# Patient Record
Sex: FEMALE | Race: WHITE | NOT HISPANIC OR LATINO | Employment: OTHER | URBAN - METROPOLITAN AREA
[De-identification: names, ages, dates, MRNs, and addresses within clinical notes are randomized per-mention and may not be internally consistent; named-entity substitution may affect disease eponyms.]

---

## 2017-01-24 ENCOUNTER — RADIANT APPOINTMENT (OUTPATIENT)
Dept: GENERAL RADIOLOGY | Facility: CLINIC | Age: 62
End: 2017-01-24
Attending: FAMILY MEDICINE
Payer: COMMERCIAL

## 2017-01-24 ENCOUNTER — OFFICE VISIT (OUTPATIENT)
Dept: URGENT CARE | Facility: URGENT CARE | Age: 62
End: 2017-01-24
Payer: COMMERCIAL

## 2017-01-24 VITALS
HEART RATE: 61 BPM | HEIGHT: 69 IN | BODY MASS INDEX: 22.96 KG/M2 | WEIGHT: 155 LBS | DIASTOLIC BLOOD PRESSURE: 59 MMHG | OXYGEN SATURATION: 98 % | SYSTOLIC BLOOD PRESSURE: 112 MMHG | TEMPERATURE: 97.9 F

## 2017-01-24 DIAGNOSIS — S69.92XA LEFT WRIST INJURY, INITIAL ENCOUNTER: ICD-10-CM

## 2017-01-24 DIAGNOSIS — S63.502A LEFT WRIST SPRAIN, INITIAL ENCOUNTER: Primary | ICD-10-CM

## 2017-01-24 PROCEDURE — 73110 X-RAY EXAM OF WRIST: CPT | Mod: LT

## 2017-01-24 PROCEDURE — 99213 OFFICE O/P EST LOW 20 MIN: CPT | Performed by: FAMILY MEDICINE

## 2017-01-24 NOTE — PATIENT INSTRUCTIONS
Wrist Sprain  A sprain is an injury to the ligaments or capsule that holds a joint together. There are no broken bones. Most sprains take about 3 to 6 weeks to heal. If it a severe sprain where the ligament is completely torn, it can take months to recover.    Most wrist sprains are treated with a splint, wrist brace, or elastic wrap for support. Severe sprains may require surgery.  Home care    Keep your arm elevated to reduce pain and swelling. This is very important during the first 48 hours.    Apply an ice pack over the injured area for 15 to 20 minutes every 3 to 6 hours. You should do this for the first 24 to 48 hours. You can make an ice pack by filling a plastic bag that seals at the top with ice cubes and then wrapping it with a thin towel. Continue to use ice packs for relief of pain and swelling as needed. As the ice melts, be careful to avoid getting your wrap, splint, or cast wet. After 48 hours, apply heat (warm shower or warm bath) for 15 to 20 minutes several times a day, or alternate ice and heat.     You may use over-the-counter pain medicine to control pain, unless another pain medicine was prescribed. If you have chronic liver or kidney disease or ever had a stomach ulcer or GI bleeding, talk with your doctor before using these medicines.    If you were given a splint or brace, wear it for the time advised by your doctor.  Follow-up care  Follow up with your healthcare provider as advised. Any X-rays you had today don t show any broken bones, breaks, or fractures. Sometimes fractures don t show up on the first X-ray. Bruises and sprains can sometimes hurt as much as a fracture. These injuries can take time to heal completely. If your symptoms don t improve or they get worse, talk with your doctor. You may need a repeat X-ray. If X-rays were taken, you will be told of any new findings that may affect your care.  When to seek medical advice  Call your healthcare provider right away if any of  these occur:    Pain or swelling increases    Fingers or hand becomes cold, blue, numb, or tingly    1818-7145 [employer]. [address] [place] [address], [place] [address]. All rights reserved. This information is not intended as a substitute for professional medical care. Always follow your healthcare professional's instructions.

## 2017-01-24 NOTE — MR AVS SNAPSHOT
After Visit Summary   1/24/2017    Debbie Richardson    MRN: [id number]           Patient Information     Date Of Birth          1955        Visit Information        Provider Department      1/24/2017 5:00 PM Kaela Gillespie MD [facility]        Today's Diagnoses     Left wrist sprain, initial encounter    -  1     Left wrist injury, initial encounter           Care Instructions      Wrist Sprain  A sprain is an injury to the ligaments or capsule that holds a joint together. There are no broken bones. Most sprains take about 3 to 6 weeks to heal. If it a severe sprain where the ligament is completely torn, it can take months to recover.    Most wrist sprains are treated with a splint, wrist brace, or elastic wrap for support. Severe sprains may require surgery.  Home care    Keep your arm elevated to reduce pain and swelling. This is very important during the first 48 hours.    Apply an ice pack over the injured area for 15 to 20 minutes every 3 to 6 hours. You should do this for the first 24 to 48 hours. You can make an ice pack by filling a plastic bag that seals at the top with ice cubes and then wrapping it with a thin towel. Continue to use ice packs for relief of pain and swelling as needed. As the ice melts, be careful to avoid getting your wrap, splint, or cast wet. After 48 hours, apply heat (warm shower or warm bath) for 15 to 20 minutes several times a day, or alternate ice and heat.     You may use over-the-counter pain medicine to control pain, unless another pain medicine was prescribed. If you have chronic liver or kidney disease or ever had a stomach ulcer or GI bleeding, talk with your doctor before using these medicines.    If you were given a splint or brace, wear it for the time advised by your doctor.  Follow-up care  Follow up with your healthcare provider as advised. Any X-rays you had today don t show any broken bones, breaks,  or fractures. Sometimes fractures don t show up on the first X-ray. Bruises and sprains can sometimes hurt as much as a fracture. These injuries can take time to heal completely. If your symptoms don t improve or they get worse, talk with your doctor. You may need a repeat X-ray. If X-rays were taken, you will be told of any new findings that may affect your care.  When to seek medical advice  Call your healthcare provider right away if any of these occur:    Pain or swelling increases    Fingers or hand becomes cold, blue, numb, or tingly    7243-8808 [employer]. [address] [place] [address]. All rights reserved. This information is not intended as a substitute for professional medical care. Always follow your healthcare professional's instructions.              Follow-ups after your visit        Who to contact     If you have questions or need follow up information about today's clinic visit or your schedule please contact [facility] directly at [phone].  Normal or non-critical lab and imaging results will be communicated to you by [employer], letter or phone within 4 business days after the clinic has received the results. If you do not hear from us within 7 days, please contact the clinic through [employer] or phone. If you have a critical or abnormal lab result, we will notify you by phone as soon as possible.  Submit refill requests through [employer] or call your pharmacy and they will forward the refill request to us. Please allow 3 business days for your refill to be completed.          Additional Information About Your Visit        [employer] Information     [employer] gives you secure access to your electronic health record. If you see a primary care provider, you can also send messages to your care team and make appointments. If you have questions, please call your primary care clinic.  If you do not have a primary care provider, please call [phone] and they  "will assist you.        Care EveryWhere ID     This is your Care EveryWhere ID. This could be used by other organizations to access your [place] medical records  [id number]        Your Vitals Were     Pulse Temperature Height BMI (Body Mass Index) Pulse Oximetry Last Period    61 97.9  F (36.6  C) (Tympanic) 5' 8.5\" (1.74 m) 23.22 kg/m2 98% 07/13/2008       Blood Pressure from Last 3 Encounters:   01/24/17 112/59   11/30/16 101/65   08/16/16 106/64    Weight from Last 3 Encounters:   01/24/17 155 lb (70.308 kg)   11/30/16 156 lb (70.761 kg)   07/12/16 150 lb (68.04 kg)                 Today's Medication Changes          These changes are accurate as of: 1/24/17  5:35 PM.  If you have any questions, ask your nurse or doctor.               Start taking these medicines.        Dose/Directions    order for DME   Used for:  Left wrist sprain, initial encounter   Started by:  Kaela Gillespie MD        Equipment being ordered: left wrist splint   Quantity:  1 Device   Refills:  0         These medicines have changed or have updated prescriptions.        Dose/Directions    sertraline 50 MG tablet   Commonly known as:  ZOLOFT   This may have changed:    - how much to take  - when to take this  - additional instructions   Used for:  Major depressive disorder, recurrent episode, mild (H)        Take one and 1/2 tablets daily   Quantity:  135 tablet   Refills:  PRN            Where to get your medicines      Some of these will need a paper prescription and others can be bought over the counter.  Ask your nurse if you have questions.     Bring a paper prescription for each of these medications    - order for DME             Primary Care Provider Office Phone # Fax #    Ai De La Paz [phone]       [facility] [address] [place]  [facility] [address]        Thank you!     Thank you for choosing [facility]  for your care. Our goal is always to provide " you with excellent care. Hearing back from our patients is one way we can continue to improve our services. Please take a few minutes to complete the written survey that you may receive in the mail after your visit with us. Thank you!             Your Updated Medication List - Protect others around you: Learn how to safely use, store and throw away your medicines at www.disposemymeds.org.          This list is accurate as of: 1/24/17  5:35 PM.  Always use your most recent med list.                   Brand Name Dispense Instructions for use    conjugated estrogens cream    PREMARIN    30 g    Use 1.5 gm vaginally 2-3 times a week       order for DME     1 Device    Equipment being ordered: left wrist splint       sertraline 50 MG tablet    ZOLOFT    135 tablet    Take one and 1/2 tablets daily       traZODone 100 MG tablet    DESYREL    180 tablet    Take  by mouth At Bedtime. Take 1 1/2 to 2 tablets by mouth at bedtime for sleep       VITAMIN B COMPLEX PO          VITAMIN C PO          VITAMIN D (CHOLECALCIFEROL) PO      Take by mouth as needed

## 2017-01-24 NOTE — NURSING NOTE
"Chief Complaint   Patient presents with     Urgent Care     Wrist Pain     c/o left wrist painful for 1 day       Initial /59 mmHg  Pulse 61  Temp(Src) 97.9  F (36.6  C) (Tympanic)  Ht 5' 8.5\" (1.74 m)  Wt 155 lb (70.308 kg)  BMI 23.22 kg/m2  SpO2 98%  LMP 07/13/2008 Estimated body mass index is 23.22 kg/(m^2) as calculated from the following:    Height as of this encounter: 5' 8.5\" (1.74 m).    Weight as of this encounter: 155 lb (70.308 kg).  BP completed using cuff size: regular  Taisha Ashton MA    "

## 2017-01-24 NOTE — PROGRESS NOTES
SUBJECTIVE:  Debbie Richardson is a 61 year old female who sustained a right wrist injury 2  hours ago. Mechanism of injury: fell on outstretched hand when slipped [place] icy surface at dog park. Immediate symptoms: immediate pain, delayed swelling, inability to use wrist directly after injury. Symptoms have been worsening since that time. Prior history of related problems: no prior problems with this area in the past.    OBJECTIVE:  Vital signs as noted above.  Appearance: in no apparent distress.  Left wrist exam: soft tissue tenderness and swelling at the distal radius and joint space radially, scaphoid (snuffbox) tenderness absent, radial pulse normal, remainder of ipsilateral wrist, hand and finger exam is normal.    Left wrist X-ray: no fracture or dislocation noted.    ASSESSMENT:  Left wrist sprain    PLAN:  Tylenol or Ibuprofen OTC, ice suggested.  Wrist splint use.  Activity modification and elevation prn.   See PCP in follow up in 2 weeks.  See orders in EpicCare.  Kaela Walker MD

## 2017-02-10 ENCOUNTER — MYC MEDICAL ADVICE (OUTPATIENT)
Dept: FAMILY MEDICINE | Facility: CLINIC | Age: 62
End: 2017-02-10

## 2017-02-13 NOTE — TELEPHONE ENCOUNTER
JYOTHI FYI:   Left wrist injury improved with home care. A no show appt was on 2/9.  Yuliana Xie RN

## 2017-03-27 ENCOUNTER — RADIANT APPOINTMENT (OUTPATIENT)
Dept: MAMMOGRAPHY | Facility: CLINIC | Age: 62
End: 2017-03-27
Attending: NURSE PRACTITIONER
Payer: COMMERCIAL

## 2017-03-27 DIAGNOSIS — Z12.31 VISIT FOR SCREENING MAMMOGRAM: ICD-10-CM

## 2017-03-27 PROCEDURE — G0202 SCR MAMMO BI INCL CAD: HCPCS

## 2017-04-05 ENCOUNTER — OFFICE VISIT (OUTPATIENT)
Dept: FAMILY MEDICINE | Facility: CLINIC | Age: 62
End: 2017-04-05
Payer: COMMERCIAL

## 2017-04-05 VITALS
OXYGEN SATURATION: 96 % | HEIGHT: 69 IN | HEART RATE: 72 BPM | WEIGHT: 159 LBS | SYSTOLIC BLOOD PRESSURE: 97 MMHG | RESPIRATION RATE: 18 BRPM | BODY MASS INDEX: 23.55 KG/M2 | DIASTOLIC BLOOD PRESSURE: 67 MMHG

## 2017-04-05 DIAGNOSIS — Z23 NEED FOR HEPATITIS A VACCINATION: ICD-10-CM

## 2017-04-05 DIAGNOSIS — B37.2 CUTANEOUS CANDIDIASIS: ICD-10-CM

## 2017-04-05 DIAGNOSIS — G47.00 INSOMNIA, UNSPECIFIED: ICD-10-CM

## 2017-04-05 DIAGNOSIS — N95.2 VAGINAL ATROPHY: ICD-10-CM

## 2017-04-05 DIAGNOSIS — Z11.59 NEED FOR HEPATITIS C SCREENING TEST: ICD-10-CM

## 2017-04-05 DIAGNOSIS — Z00.00 ENCOUNTER FOR ROUTINE ADULT HEALTH EXAMINATION WITHOUT ABNORMAL FINDINGS: Primary | ICD-10-CM

## 2017-04-05 PROCEDURE — 90471 IMMUNIZATION ADMIN: CPT | Performed by: NURSE PRACTITIONER

## 2017-04-05 PROCEDURE — 80061 LIPID PANEL: CPT | Performed by: NURSE PRACTITIONER

## 2017-04-05 PROCEDURE — 36415 COLL VENOUS BLD VENIPUNCTURE: CPT | Performed by: NURSE PRACTITIONER

## 2017-04-05 PROCEDURE — 86803 HEPATITIS C AB TEST: CPT | Performed by: NURSE PRACTITIONER

## 2017-04-05 PROCEDURE — 90632 HEPA VACCINE ADULT IM: CPT | Performed by: NURSE PRACTITIONER

## 2017-04-05 PROCEDURE — 82947 ASSAY GLUCOSE BLOOD QUANT: CPT | Performed by: NURSE PRACTITIONER

## 2017-04-05 PROCEDURE — 99396 PREV VISIT EST AGE 40-64: CPT | Mod: 25 | Performed by: NURSE PRACTITIONER

## 2017-04-05 RX ORDER — TRAZODONE HYDROCHLORIDE 100 MG/1
100 TABLET ORAL AT BEDTIME
Qty: 90 TABLET | Refills: 0 | Status: SHIPPED | OUTPATIENT
Start: 2017-04-05 | End: 2017-06-20

## 2017-04-05 RX ORDER — NYSTATIN 100000 U/G
CREAM TOPICAL 2 TIMES DAILY
Qty: 30 G | Refills: 0 | Status: SHIPPED | OUTPATIENT
Start: 2017-04-05 | End: 2017-06-20

## 2017-04-05 NOTE — LETTER
My Depression Action Plan  Name: Debbie Richardson   Date of Birth 1955  Date: 4/5/2017    My doctor: Ai De La Paz   My clinic: [address] [address]  [phone]          GREEN    ZONE   Good Control    What it looks like:     Things are going generally well. You have normal up s and down s. You may even feel depressed from time to time, but bad moods usually last less than a day.   What you need to do:  1. Continue to care for yourself (see self care plan)  2. Check your depression survival kit and update it as needed  3. Follow your physician s recommendations including any medication.  4. Do not stop taking medication unless you consult with your physician first.           YELLOW         ZONE Getting Worse    What it looks like:     Depression is starting to interfere with your life.     It may be hard to get out of bed; you may be starting to isolate yourself from others.    Symptoms of depression are starting to last most all day and this has happened for several days.     You may have suicidal thoughts but they are not constant.   What you need to do:     1. Call your care team, your response to treatment will improve if you keep your care team informed of your progress. Yellow periods are signs an adjustment may need to be made.     2. Continue your self-care, even if you have to fake it!    3. Talk to someone in your support network    4. Open up your depression survival kit           RED    ZONE Medical Alert - Get Help    What it looks like:     Depression is seriously interfering with your life.     You may experience these or other symptoms: You can t get out of bed most days, can t work or engage in other necessary activities, you have trouble taking care of basic hygiene, or basic responsibilities, thoughts of suicide or death that will not go away, self-injurious behavior.     What you need to do:  1. Call your  care team and request a same-day appointment. If they are not available (weekends or after hours) call your local crisis line, emergency room or 911.      Electronically signed by: Mara Santos, April 5, 2017    Depression Self Care Plan / Survival Kit    Self-Care for Depression  Here s the deal. Your body and mind are really not as separate as most people think.  What you do and think affects how you feel and how you feel influences what you do and think. This means if you do things that people who feel good do, it will help you feel better.  Sometimes this is all it takes.  There is also a place for medication and therapy depending on how severe your depression is, so be sure to consult with your medical provider and/ or Behavioral Health Consultant if your symptoms are worsening or not improving.     In order to better manage my stress, I will:    Exercise  Get some form of exercise, every day. This will help reduce pain and release endorphins, the  feel good  chemicals in your brain. This is almost as good as taking antidepressants!  This is not the same as joining a gym and then never going! (they count on that by the way ) It can be as simple as just going for a walk or doing some gardening, anything that will get you moving.      Hygiene   Maintain good hygiene (Get out of bed in the morning, Make your bed, Brush your teeth, Take a shower, and Get dressed like you were going to work, even if you are unemployed).  If your clothes don't fit try to get ones that do.    Diet  I will strive to eat foods that are good for me, drink plenty of water, and avoid excessive sugar, caffeine, alcohol, and other mood-altering substances.  Some foods that are helpful in depression are: complex carbohydrates, B vitamins, flaxseed, fish or fish oil, fresh fruits and vegetables.    Psychotherapy  I agree to participate in Individual Therapy (if recommended).    Medication  If prescribed medications, I agree to take them.   Missing doses can result in serious side effects.  I understand that drinking alcohol, or other illicit drug use, may cause potential side effects.  I will not stop my medication abruptly without first discussing it with my provider.    Staying Connected With Others  I will stay in touch with my friends, family members, and my primary care provider/team.    Use your imagination  Be creative.  We all have a creative side; it doesn t matter if it s oil painting, sand castles, or mud pies! This will also kick up the endorphins.    Witness Beauty  (AKA stop and smell the roses) Take a look outside, even in mid-winter. Notice colors, textures. Watch the squirrels and birds.     Service to others  Be of service to others.  There is always someone else in need.  By helping others we can  get out of ourselves  and remember the really important things.  This also provides opportunities for practicing all the other parts of the program.    Humor  Laugh and be silly!  Adjust your TV habits for less news and crime-drama and more comedy.    Control your stress  Try breathing deep, massage therapy, biofeedback, and meditation. Find time to relax each day.     My support system    Clinic Contact:  Phone number:    Contact 1:  Phone number:    Contact 2:  Phone number:    [employer]/:  Phone number:    Therapist:  Phone number:    Local crisis center:    Phone number:    Other community support:  Phone number:

## 2017-04-05 NOTE — NURSING NOTE
Screening Questionnaire for Adult Immunization    Are you sick today?   No   Do you have allergies to medications, food, a vaccine component or latex?   No   Have you ever had a serious reaction after receiving a vaccination?   No   Do you have a long-term health problem with heart disease, lung disease, asthma, kidney disease, metabolic disease (e.g. diabetes), anemia, or other blood disorder?   No   Do you have cancer, leukemia, HIV/AIDS, or any other immune system problem?   No   In the past 3 months, have you taken medications that affect  your immune system, such as prednisone, other steroids, or anticancer drugs; drugs for the treatment of rheumatoid arthritis, Crohn s disease, or psoriasis; or have you had radiation treatments?   No   Have you had a seizure, or a brain or other nervous system problem?   No   During the past year, have you received a transfusion of blood or blood     products, or been given immune (gamma) globulin or antiviral drug?   No   For women: Are you pregnant or is there a chance you could become        pregnant during the next month?   No   Have you received any vaccinations in the past 4 weeks?   No     Immunization questionnaire answers were all negative.      MNVFC doesn't apply on this patient    Per orders of Dr. De La Paz, injection of Hep A given by Mara Santos. Patient instructed to remain in clinic for 20 minutes afterwards, and to report any adverse reaction to me immediately.       Screening performed by Mara Santos on 4/5/2017 at 1:08 PM.

## 2017-04-05 NOTE — MR AVS SNAPSHOT
After Visit Summary   4/5/2017    Debbie Richardson    MRN: [id number]           Patient Information     Date Of Birth          1955        Visit Information        Provider Department      4/5/2017 10:30 AM Ai De La Paz NP [facility]        Today's Diagnoses     Encounter for routine adult health examination without abnormal findings    -  1    Need for hepatitis C screening test        Vaginal atrophy        Insomnia, unspecified        Need for hepatitis A vaccination        Cutaneous candidiasis          Care Instructions    Decrease Zoloft to 1/2 tablet daily for 2 weeks then stop.    After a few weeks, you could try decreasing Trazodone to 1/2 tablet nightly for 2 weeks and if sleeping well, try stopping it.       Preventive Health Recommendations  Female Ages 50 - 64    Yearly exam: See your health care provider every year in order to  o Review health changes.   o Discuss preventive care.    o Review your medicines if your doctor has prescribed any.      Get a Pap test every three years (unless you have an abnormal result and your provider advises testing more often).    If you get Pap tests with HPV test, you only need to test every 5 years, unless you have an abnormal result.     You do not need a Pap test if your uterus was removed (hysterectomy) and you have not had cancer.    You should be tested each year for STDs (sexually transmitted diseases) if you're at risk.     Have a mammogram every 1 to 2 years.    Have a colonoscopy at age 50, or have a yearly FIT test (stool test). These exams screen for colon cancer.      Have a cholesterol test every 5 years, or more often if advised.    Have a diabetes test (fasting glucose) every three years. If you are at risk for diabetes, you should have this test more often.     If you are at risk for osteoporosis (brittle bone disease), think about having a bone density scan (DEXA).    Shots: Get a flu shot each  year. Get a tetanus shot every 10 years.    Nutrition:     Eat at least 5 servings of fruits and vegetables each day.    Eat whole-grain bread, whole-wheat pasta and brown rice instead of white grains and rice.    Talk to your provider about Calcium and Vitamin D.     Lifestyle    Exercise at least 150 minutes a week (30 minutes a day, 5 days a week). This will help you control your weight and prevent disease.    Limit alcohol to one drink per day.    No smoking.     Wear sunscreen to prevent skin cancer.     See your dentist every six months for an exam and cleaning.    See your eye doctor every 1 to 2 years.          Follow-ups after your visit        Additional Services     DERMATOLOGY REFERRAL       Your provider has referred you to: [facility]: [facility] [place] [phone]   [email or web address]  [place] [phone]   [email or web address]  [place] [phone]   [email or web address]  [place] [phone]   [email or web address]    Please be aware that coverage of these services is subject to the terms and limitations of your health insurance plan.  Call member services at your health plan with any benefit or coverage questions.      Please bring the following with you to your appointment:    (1) Any X-Rays, CTs or MRIs which have been performed.  Contact the facility where they were done to arrange for  prior to your scheduled appointment.    (2) List of current medications  (3) This referral request   (4) Any documents/labs given to you for this referral                  Who to contact     If you have questions or need follow up information about today's clinic visit or your schedule please contact [facility] directly at [phone].  Normal or non-critical lab and imaging results will be communicated to you by MyChart, letter or phone within 4 business days after the clinic has  "received the results. If you do not hear from us within 7 days, please contact the clinic through [employer] or phone. If you have a critical or abnormal lab result, we will notify you by phone as soon as possible.  Submit refill requests through [employer] or call your pharmacy and they will forward the refill request to us. Please allow 3 business days for your refill to be completed.          Additional Information About Your Visit        [employer] Information     [employer] gives you secure access to your electronic health record. If you see a primary care provider, you can also send messages to your care team and make appointments. If you have questions, please call your primary care clinic.  If you do not have a primary care provider, please call [phone] and they will assist you.        Care EveryWhere ID     This is your Care EveryWhere ID. This could be used by other organizations to access your [place] medical records  [id number]        Your Vitals Were     Pulse Respirations Height Last Period Pulse Oximetry BMI (Body Mass Index)    72 18 5' 8.5\" (1.74 m) 07/13/2008 96% 23.82 kg/m2       Blood Pressure from Last 3 Encounters:   04/05/17 97/67   01/24/17 112/59   11/30/16 101/65    Weight from Last 3 Encounters:   04/05/17 159 lb (72.1 kg)   01/24/17 155 lb (70.3 kg)   11/30/16 156 lb (70.8 kg)              We Performed the Following     DEPRESSION ACTION PLAN (DAP)     DERMATOLOGY REFERRAL     GLUCOSE     HEPATITIS A VACCINE (ADULT)     Hepatitis C Screen Reflex to HCV RNA Quant and Genotype     Lipid Profile with reflex to direct LDL          Today's Medication Changes          These changes are accurate as of: 4/5/17 11:19 AM.  If you have any questions, ask your nurse or doctor.               Start taking these medicines.        Dose/Directions    nystatin cream   Commonly known as:  MYCOSTATIN   Used for:  Cutaneous candidiasis        Apply topically 2 times daily   Quantity:  30 g   Refills:  0       "   These medicines have changed or have updated prescriptions.        Dose/Directions    conjugated estrogens cream   Commonly known as:  PREMARIN   This may have changed:    - how much to take  - how to take this  - when to take this  - additional instructions   Used for:  Vaginal atrophy        Dose:  1.5 g   Place 1.5 g vaginally three times a week Use 1.5 gm vaginally 2-3 times a weekUse 1.5 gm vaginally 2-3 times a week   Quantity:  30 g   Refills:  PRN       sertraline 50 MG tablet   Commonly known as:  ZOLOFT   This may have changed:    - how much to take  - when to take this  - additional instructions   Used for:  Major depressive disorder, recurrent episode, mild (H)        Take one and 1/2 tablets daily   Quantity:  135 tablet   Refills:  PRN       traZODone 100 MG tablet   Commonly known as:  DESYREL   This may have changed:    - how much to take  - how to take this  - when to take this   Used for:  Insomnia, unspecified        Dose:  100 mg   Take 1 tablet (100 mg) by mouth At Bedtime Take  by mouth At Bedtime. Take 1 1/2 to 2 tablets by mouth at bedtime for sleep   Quantity:  90 tablet   Refills:  0            Where to get your medicines      These medications were sent to [facility] - [place], [place] - [address]  [address], [place] [address]     Phone:  [phone]     conjugated estrogens cream    nystatin cream    traZODone 100 MG tablet                Primary Care Provider Office Phone # Fax #    Ai CRIS De La Paz [phone]       [facility] [address] [place] [address]  [facility] [address]        Thank you!     Thank you for choosing [facility]  for your care. Our goal is always to provide you with excellent care. Hearing back from our patients is one way we can continue to improve our services. Please take a few minutes to complete the written survey that you may receive in the mail after your visit with us. Thank you!              Your Updated Medication List - Protect others around you: Learn how to safely use, store and throw away your medicines at www.disposemymeds.org.          This list is accurate as of: 4/5/17 11:19 AM.  Always use your most recent med list.                   Brand Name Dispense Instructions for use    conjugated estrogens cream    PREMARIN    30 g    Place 1.5 g vaginally three times a week Use 1.5 gm vaginally 2-3 times a weekUse 1.5 gm vaginally 2-3 times a week       nystatin cream    MYCOSTATIN    30 g    Apply topically 2 times daily       sertraline 50 MG tablet    ZOLOFT    135 tablet    Take one and 1/2 tablets daily       traZODone 100 MG tablet    DESYREL    90 tablet    Take 1 tablet (100 mg) by mouth At Bedtime Take  by mouth At Bedtime. Take 1 1/2 to 2 tablets by mouth at bedtime for sleep       VITAMIN B COMPLEX PO      Reported on 4/5/2017       VITAMIN C PO      Reported on 4/5/2017       VITAMIN D (CHOLECALCIFEROL) PO      Take by mouth as needed Reported on 4/5/2017

## 2017-04-05 NOTE — NURSING NOTE
"Chief Complaint   Patient presents with     Physical     Depression       Initial BP 97/67  Pulse 72  Resp 18  Ht 5' 8.5\" (1.74 m)  Wt 159 lb (72.1 kg)  LMP 07/13/2008  SpO2 96%  BMI 23.82 kg/m2 Estimated body mass index is 23.82 kg/(m^2) as calculated from the following:    Height as of this encounter: 5' 8.5\" (1.74 m).    Weight as of this encounter: 159 lb (72.1 kg).  Medication Reconciliation: complete     Mara Santos MA      "

## 2017-04-05 NOTE — PROGRESS NOTES
SUBJECTIVE:     CC: Debbie Richardson is an 61 year old woman who presents for preventive health visit.     Healthy Habits:    Do you get at least three servings of calcium containing foods daily (dairy, green leafy vegetables, etc.)? 0-2 per day    Amount of exercise or daily activities, outside of work: yoga once a week and walking 3 times per week    Problems taking medications regularly No    Medication side effects: No    Have you had an eye exam in the past two years? yes    Do you see a dentist twice per year? yes    Do you have sleep apnea, excessive snoring or daytime drowsiness?no    She has been seeing a therapist and her depression is well-controlled, so she would like to try going off of Zoloft and Trazodone.     She has a rash in her left armpit.        Today's PHQ-2 Score:   PHQ-2 ( 1999 Pfizer) 6/23/2016 1/29/2014   Q1: Little interest or pleasure in doing things 0 0   Q2: Feeling down, depressed or hopeless 0 0   PHQ-2 Score 0 0   Little interest or pleasure in doing things - -   Feeling down, depressed or hopeless - -   PHQ-2 Score - -       Abuse: Current or Past(Physical, Sexual or Emotional)- No, PTSD  Do you feel safe in your environment - Yes    Social History   Substance Use Topics     Smoking status: Never Smoker     Smokeless tobacco: Never Used     Alcohol use 0.0 oz/week      Comment: Moderate drinker     The patient does not drink >3 drinks per day nor >7 drinks per week.    Recent Labs   Lab Test  01/29/14   1042   CHOL  158   HDL  50   LDL  86   TRIG  109   CHOLHDLRATIO  3.2       Reviewed orders with patient.  Reviewed health maintenance and updated orders accordingly - Yes    Mammo Decision Support:      Pertinent mammograms are reviewed under the imaging tab.  History of abnormal Pap smear: Status post benign hysterectomy. Health Maintenance and Surgical History updated.    Reviewed and updated as needed this visit by clinical staff  Allergies  Meds         Reviewed  "and updated as needed this visit by Provider            ROS:  C: NEGATIVE for fever, chills, change in weight  INTEGUMENTARY/SKIN: see HPI  E: NEGATIVE for vision changes or irritation  ENT: NEGATIVE for ear, mouth and throat problems  R: NEGATIVE for significant cough or SOB  B: NEGATIVE for masses, tenderness or discharge  CV: NEGATIVE for chest pain, palpitations or peripheral edema  GI: NEGATIVE for nausea, abdominal pain, heartburn, or change in bowel habits  : NEGATIVE for unusual urinary or vaginal symptoms. No vaginal bleeding.  M: NEGATIVE for significant arthralgias or myalgia  N: NEGATIVE for weakness, dizziness or paresthesias  P: NEGATIVE for changes in mood or affect     Problem list, Medication list, Allergies, and Medical/Social/Surgical histories reviewed in EPIC and updated as appropriate.  OBJECTIVE:     BP 97/67  Pulse 72  Resp 18  Ht 5' 8.5\" (1.74 m)  Wt 159 lb (72.1 kg)  LMP 07/13/2008  SpO2 96%  BMI 23.82 kg/m2  EXAM:  GENERAL: healthy, alert and no distress  EYES: Eyes grossly normal to inspection, PERRL and conjunctivae and sclerae normal  HENT: ear canals and TM's normal, nose and mouth without ulcers or lesions  NECK: no adenopathy, no asymmetry, masses, or scars and thyroid normal to palpation  RESP: lungs clear to auscultation - no rales, rhonchi or wheezes  BREAST: normal without masses, tenderness or nipple discharge and no palpable axillary masses or adenopathy  CV: regular rate and rhythm, normal S1 S2, no S3 or S4, no murmur, click or rub, no peripheral edema and peripheral pulses strong  ABDOMEN: soft, nontender, no hepatosplenomegaly, no masses and bowel sounds normal   (female): normal female external genitalia, normal urethral meatus, vaginal mucosa pink, moist, well rugated, and normal cervix/adnexa/uterus without masses or discharge  MS: no gross musculoskeletal defects noted, no edema  SKIN: erythematous patch left axilla  NEURO: Normal strength and tone, mentation " "intact and speech normal  PSYCH: mentation appears normal, affect normal/bright    ASSESSMENT/PLAN:     1. Encounter for routine adult health examination without abnormal findings    - GLUCOSE  - Lipid Profile with reflex to direct LDL  - DERMATOLOGY REFERRAL    2. Need for hepatitis C screening test    - Hepatitis C Screen Reflex to HCV RNA Quant and Genotype    3. Vaginal atrophy    - conjugated estrogens (PREMARIN) cream; Place 1.5 g vaginally three times a week Use 1.5 gm vaginally 2-3 times a weekUse 1.5 gm vaginally 2-3 times a week  Dispense: 30 g; Refill: PRN    4. Insomnia, unspecified  See Pt Instructions regarding tapering off medication.   - traZODone (DESYREL) 100 MG tablet; Take 1 tablet (100 mg) by mouth At Bedtime Take  by mouth At Bedtime. Take 1 1/2 to 2 tablets by mouth at bedtime for sleep  Dispense: 90 tablet; Refill: 0    5. Need for hepatitis A vaccination    - HEPATITIS A VACCINE (ADULT)    6. Cutaneous candidiasis    - nystatin (MYCOSTATIN) cream; Apply topically 2 times daily  Dispense: 30 g; Refill: 0    COUNSELING:   Reviewed preventive health counseling, as reflected in patient instructions         reports that she has never smoked. She has never used smokeless tobacco.    Estimated body mass index is 23.82 kg/(m^2) as calculated from the following:    Height as of this encounter: 5' 8.5\" (1.74 m).    Weight as of this encounter: 159 lb (72.1 kg).       Counseling Resources:  ATP IV Guidelines  Pooled Cohorts Equation Calculator  Breast Cancer Risk Calculator  FRAX Risk Assessment  ICSI Preventive Guidelines  Dietary Guidelines for Americans, 2010  USDA's MyPlate  ASA Prophylaxis  Lung CA Screening    Ai De La Paz NP  [facility]  "

## 2017-04-05 NOTE — PATIENT INSTRUCTIONS
Decrease Zoloft to 1/2 tablet daily for 2 weeks then stop.    After a few weeks, you could try decreasing Trazodone to 1/2 tablet nightly for 2 weeks and if sleeping well, try stopping it.       Preventive Health Recommendations  Female Ages 50 - 64    Yearly exam: See your health care provider every year in order to  o Review health changes.   o Discuss preventive care.    o Review your medicines if your doctor has prescribed any.      Get a Pap test every three years (unless you have an abnormal result and your provider advises testing more often).    If you get Pap tests with HPV test, you only need to test every 5 years, unless you have an abnormal result.     You do not need a Pap test if your uterus was removed (hysterectomy) and you have not had cancer.    You should be tested each year for STDs (sexually transmitted diseases) if you're at risk.     Have a mammogram every 1 to 2 years.    Have a colonoscopy at age 50, or have a yearly FIT test (stool test). These exams screen for colon cancer.      Have a cholesterol test every 5 years, or more often if advised.    Have a diabetes test (fasting glucose) every three years. If you are at risk for diabetes, you should have this test more often.     If you are at risk for osteoporosis (brittle bone disease), think about having a bone density scan (DEXA).    Shots: Get a flu shot each year. Get a tetanus shot every 10 years.    Nutrition:     Eat at least 5 servings of fruits and vegetables each day.    Eat whole-grain bread, whole-wheat pasta and brown rice instead of white grains and rice.    Talk to your provider about Calcium and Vitamin D.     Lifestyle    Exercise at least 150 minutes a week (30 minutes a day, 5 days a week). This will help you control your weight and prevent disease.    Limit alcohol to one drink per day.    No smoking.     Wear sunscreen to prevent skin cancer.     See your dentist every six months for an exam and cleaning.    See your eye  doctor every 1 to 2 years.

## 2017-04-06 LAB
CHOLEST SERPL-MCNC: 183 MG/DL
GLUCOSE SERPL-MCNC: 72 MG/DL (ref 70–99)
HCV AB SERPL QL IA: NORMAL
HDLC SERPL-MCNC: 75 MG/DL
LDLC SERPL CALC-MCNC: 93 MG/DL
NONHDLC SERPL-MCNC: 108 MG/DL
TRIGL SERPL-MCNC: 73 MG/DL

## 2017-04-06 ASSESSMENT — PATIENT HEALTH QUESTIONNAIRE - PHQ9: SUM OF ALL RESPONSES TO PHQ QUESTIONS 1-9: 1

## 2017-04-12 ENCOUNTER — TRANSFERRED RECORDS (OUTPATIENT)
Dept: HEALTH INFORMATION MANAGEMENT | Facility: CLINIC | Age: 62
End: 2017-04-12

## 2017-05-10 ENCOUNTER — OFFICE VISIT (OUTPATIENT)
Dept: FAMILY MEDICINE | Facility: CLINIC | Age: 62
End: 2017-05-10
Payer: COMMERCIAL

## 2017-05-10 VITALS
SYSTOLIC BLOOD PRESSURE: 99 MMHG | TEMPERATURE: 97.5 F | BODY MASS INDEX: 24.12 KG/M2 | RESPIRATION RATE: 16 BRPM | OXYGEN SATURATION: 98 % | WEIGHT: 161 LBS | DIASTOLIC BLOOD PRESSURE: 65 MMHG | HEART RATE: 77 BPM

## 2017-05-10 DIAGNOSIS — R30.0 DYSURIA: ICD-10-CM

## 2017-05-10 DIAGNOSIS — N89.8 VAGINAL ITCHING: Primary | ICD-10-CM

## 2017-05-10 LAB
ALBUMIN UR-MCNC: NEGATIVE MG/DL
APPEARANCE UR: CLEAR
BILIRUB UR QL STRIP: NEGATIVE
COLOR UR AUTO: YELLOW
GLUCOSE UR STRIP-MCNC: NEGATIVE MG/DL
HGB UR QL STRIP: NEGATIVE
KETONES UR STRIP-MCNC: NEGATIVE MG/DL
LEUKOCYTE ESTERASE UR QL STRIP: NEGATIVE
MICRO REPORT STATUS: NORMAL
NITRATE UR QL: NEGATIVE
PH UR STRIP: 7 PH (ref 5–7)
SP GR UR STRIP: 1.01 (ref 1–1.03)
SPECIMEN SOURCE: NORMAL
URN SPEC COLLECT METH UR: NORMAL
UROBILINOGEN UR STRIP-ACNC: 0.2 EU/DL (ref 0.2–1)
WET PREP SPEC: NORMAL

## 2017-05-10 PROCEDURE — 87210 SMEAR WET MOUNT SALINE/INK: CPT | Performed by: FAMILY MEDICINE

## 2017-05-10 PROCEDURE — 99213 OFFICE O/P EST LOW 20 MIN: CPT | Performed by: FAMILY MEDICINE

## 2017-05-10 PROCEDURE — 81003 URINALYSIS AUTO W/O SCOPE: CPT | Performed by: FAMILY MEDICINE

## 2017-05-10 NOTE — PROGRESS NOTES
HPI CC:  60 yo F presents for urinary symptoms.  URINARY TRACT SYMPTOMS      Duration: Monday     Description                 frequency    Intensity:  mild    Accompanying signs and symptoms:  Fever/chills: no, pt state she was cold then hot, on and off    Flank pain no   Nausea and vomiting: no   Vaginal symptoms: itching  Abdominal/Pelvic Pain: YES. Abdominal pain     History  History of frequent UTI's: no   History of kidney stones: no   Sexually Active: YES  Possibility of pregnancy: No    Precipitating or alleviating factors: None    Therapies tried and outcome: none   Outcome: n/a     Sunday--sang in [email or web address], hydrated a lot  Monday--urinary frequency, thought it was just from the increased water intake the day before  Tuesday-frequency continued, and she noticed a dull ache suprapubic.     She notes a h/o Pelvic prolapse, s/p hysterectomy, still with cystocele and rectocele.  She has had urinary frequency in the past and treated this with pelvic floor rehab.    She notes a little minor vaginal pruritis that comes and goes; no bleeding, discharge or odor, no new partners.     Review of Systems   Constitutional: Negative for chills, diaphoresis, fever and malaise/fatigue.   Gastrointestinal: Positive for abdominal pain and blood in stool. Negative for constipation, diarrhea, nausea and vomiting.   Genitourinary: Positive for frequency. Negative for dysuria, flank pain, hematuria and urgency.   Musculoskeletal: Negative for back pain.       Allergies   Allergen Reactions     Food      Strong sensitivity to Corn and mild sensitivity to gluten      Nkda [No Known Drug Allergies]      Metal [Avinash] Rash     Nickel Rash     Current Outpatient Prescriptions   Medication     conjugated estrogens (PREMARIN) cream     traZODone (DESYREL) 100 MG tablet     B Complex Vitamins (VITAMIN B COMPLEX PO)     VITAMIN D, CHOLECALCIFEROL, PO     Ascorbic Acid (VITAMIN C PO)     nystatin (MYCOSTATIN) cream     sertraline  (ZOLOFT) 50 MG tablet     No current facility-administered medications for this visit.      Active Ambulatory Problems     Diagnosis Date Noted     Insomnia      Acute gastritis 03/12/2008     Slow transit constipation 03/12/2008     IBS (irritable bowel syndrome)      Female genital symptoms 11/29/2008     Mild major depression (H) 08/06/2009     CARDIOVASCULAR SCREENING; LDL GOAL LESS THAN 160 10/31/2010     Health Care Home 06/07/2011     GERD (gastroesophageal reflux disease) 09/27/2012     Advanced directives, counseling/discussion 10/04/2012     Vaginal atrophy 01/07/2014     Urinary urgency 03/19/2014     Urgency incontinence 03/19/2014     Cystocele, midline 03/19/2014     Rectocele 03/19/2014     Constipation 03/19/2014     Closed fracture of distal end of right radius with routine healing, unspecified fracture morphology, subsequent encounter 08/18/2016     Acute right-sided thoracic back pain 08/18/2016     Fracture of rib 08/18/2016     Resolved Ambulatory Problems     Diagnosis Date Noted     Menometrorrhagia 06/29/2009     Pain in joint, shoulder region 07/26/2010     Stiffness of joint, not elsewhere classified,  shoulder region 07/26/2010     Pain in joint, shoulder region 07/21/2011     Pain in joint, pelvic region and thigh 11/05/2012     Lumbago 11/05/2012     Lumbago 06/25/2013     Generalized muscle weakness 04/15/2014     Knee pain 11/20/2014     Right wrist pain 08/18/2016     Past Medical History:   Diagnosis Date     Depressive disorder 2000     Depressive disorder, not elsewhere classified      IBS (irritable bowel syndrome)      Insomnia, unspecified      Mild intermittent asthma        Physical Exam   Constitutional: She is well-developed, well-nourished, and in no distress.   Eyes: Conjunctivae are normal. Pupils are equal, round, and reactive to light. Right eye exhibits no discharge. Left eye exhibits no discharge. No scleral icterus.   Cardiovascular: Normal rate, regular rhythm and  normal heart sounds.  Exam reveals no gallop and no friction rub.    No murmur heard.  Pulmonary/Chest: Effort normal and breath sounds normal. No respiratory distress. She has no wheezes. She has no rales.   Abdominal: Soft. Bowel sounds are normal. She exhibits no distension. There is no tenderness. There is no rebound and no guarding.   No cva tenderness   Lymphadenopathy:     She has no cervical adenopathy.   Neurological: She is alert.   Skin: She is not diaphoretic.   Psychiatric: Affect normal.   Vitals reviewed.    UA RESULTS:  Recent Labs   Lab Test  05/10/17   1450   COLOR  Yellow   APPEARANCE  Clear   URINEGLC  Negative   URINEBILI  Negative   URINEKETONE  Negative   SG  1.015   UBLD  Negative   URINEPH  7.0   PROTEIN  Negative   UROBILINOGEN  0.2   NITRITE  Negative   LEUKEST  Negative     Wet prep: neg  BP 99/65 (BP Location: Right arm, Patient Position: Chair, Cuff Size: Adult Regular)  Pulse 77  Temp 97.5  F (36.4  C) (Oral)  Resp 16  Wt 161 lb (73 kg)  LMP 07/13/2008  SpO2 98%  BMI 24.12 kg/m2    A/P  Urinary frequency: her UA is negative, as above, as is the wet prep.  If it continues, would suggest f/u with PCP and/or OB/GYN.

## 2017-05-10 NOTE — MR AVS SNAPSHOT
After Visit Summary   5/10/2017    Debbie Richardson    MRN: [id number]           Patient Information     Date Of Birth          1955        Visit Information        Provider Department      5/10/2017 2:20 PM Allyson Belcher MD [facility]        Today's Diagnoses     Vaginal itching    -  1    Dysuria           Follow-ups after your visit        Who to contact     If you have questions or need follow up information about today's clinic visit or your schedule please contact [facility] directly at [phone].  Normal or non-critical lab and imaging results will be communicated to you by Yard Club[employer], letter or phone within 4 business days after the clinic has received the results. If you do not hear from us within 7 days, please contact the clinic through [employer] or phone. If you have a critical or abnormal lab result, we will notify you by phone as soon as possible.  Submit refill requests through Mashed Pixel or call your pharmacy and they will forward the refill request to us. Please allow 3 business days for your refill to be completed.          Additional Information About Your Visit        MyChart Information     Mashed Pixel gives you secure access to your electronic health record. If you see a primary care provider, you can also send messages to your care team and make appointments. If you have questions, please call your primary care clinic.  If you do not have a primary care provider, please call [phone] and they will assist you.        Care EveryWhere ID     This is your Care EveryWhere ID. This could be used by other organizations to access your [place] medical records  [id number]        Your Vitals Were     Pulse Temperature Respirations Last Period Pulse Oximetry BMI (Body Mass Index)    77 97.5  F (36.4  C) (Oral) 16 07/13/2008 98% 24.12 kg/m2       Blood Pressure from Last 3 Encounters:   05/10/17 99/65   04/05/17 97/67   01/24/17 112/59     Weight from Last 3 Encounters:   05/10/17 161 lb (73 kg)   04/05/17 159 lb (72.1 kg)   01/24/17 155 lb (70.3 kg)              We Performed the Following     *UA reflex to Microscopic and Culture ([place] and [facility] (except [place] and [place])     Wet prep        Primary Care Provider Office Phone # Fax #    Ai CRIS De La Paz [phone]       [facility] [address] [place]  [facility] [address]        Thank you!     Thank you for choosing [facility]  for your care. Our goal is always to provide you with excellent care. Hearing back from our patients is one way we can continue to improve our services. Please take a few minutes to complete the written survey that you may receive in the mail after your visit with us. Thank you!             Your Updated Medication List - Protect others around you: Learn how to safely use, store and throw away your medicines at www.disposemymeds.org.          This list is accurate as of: 5/10/17 11:59 PM.  Always use your most recent med list.                   Brand Name Dispense Instructions for use    conjugated estrogens cream    PREMARIN    30 g    Place 1.5 g vaginally three times a week Use 1.5 gm vaginally 2-3 times a weekUse 1.5 gm vaginally 2-3 times a week       nystatin cream    MYCOSTATIN    30 g    Apply topically 2 times daily       sertraline 50 MG tablet    ZOLOFT    135 tablet    Take one and 1/2 tablets daily       traZODone 100 MG tablet    DESYREL    90 tablet    Take 1 tablet (100 mg) by mouth At Bedtime Take  by mouth At Bedtime. Take 1 1/2 to 2 tablets by mouth at bedtime for sleep       VITAMIN B COMPLEX PO      Reported on 4/5/2017       VITAMIN C PO      Reported on 4/5/2017       VITAMIN D (CHOLECALCIFEROL) PO      Take by mouth as needed Reported on 4/5/2017

## 2017-05-10 NOTE — NURSING NOTE
"Chief Complaint   Patient presents with     UTI       Initial BP 99/65 (BP Location: Right arm, Patient Position: Chair, Cuff Size: Adult Regular)  Pulse 77  Temp 97.5  F (36.4  C) (Oral)  Resp 16  Wt 161 lb (73 kg)  LMP 07/13/2008  SpO2 98%  BMI 24.12 kg/m2 Estimated body mass index is 24.12 kg/(m^2) as calculated from the following:    Height as of 4/5/17: 5' 8.5\" (1.74 m).    Weight as of this encounter: 161 lb (73 kg).  Medication Reconciliation: complete         Michelle Hassan MA      "

## 2017-05-11 ASSESSMENT — ENCOUNTER SYMPTOMS
VOMITING: 0
ABDOMINAL PAIN: 1
NAUSEA: 0
FLANK PAIN: 0
CHILLS: 0
DIAPHORESIS: 0
DYSURIA: 0
BLOOD IN STOOL: 1
HEMATURIA: 0
FEVER: 0
CONSTIPATION: 0
DIARRHEA: 0
BACK PAIN: 0
FREQUENCY: 1

## 2017-06-20 ENCOUNTER — OFFICE VISIT (OUTPATIENT)
Dept: FAMILY MEDICINE | Facility: CLINIC | Age: 62
End: 2017-06-20
Payer: COMMERCIAL

## 2017-06-20 VITALS
BODY MASS INDEX: 23.69 KG/M2 | TEMPERATURE: 97.9 F | WEIGHT: 158.13 LBS | HEART RATE: 69 BPM | OXYGEN SATURATION: 96 % | RESPIRATION RATE: 18 BRPM | SYSTOLIC BLOOD PRESSURE: 95 MMHG | DIASTOLIC BLOOD PRESSURE: 64 MMHG

## 2017-06-20 DIAGNOSIS — G47.00 INSOMNIA, UNSPECIFIED: ICD-10-CM

## 2017-06-20 DIAGNOSIS — F33.0 MAJOR DEPRESSIVE DISORDER, RECURRENT EPISODE, MILD (H): ICD-10-CM

## 2017-06-20 PROCEDURE — 99214 OFFICE O/P EST MOD 30 MIN: CPT | Performed by: NURSE PRACTITIONER

## 2017-06-20 RX ORDER — TRAZODONE HYDROCHLORIDE 100 MG/1
100 TABLET ORAL AT BEDTIME
Qty: 135 TABLET | Status: SHIPPED | OUTPATIENT
Start: 2017-06-20 | End: 2018-01-03

## 2017-06-20 NOTE — PROGRESS NOTES
"  SUBJECTIVE:                                                    Debbie Richardson is a 61 year old female who presents to clinic today for the following health issues:      Depression Followup    Status since last visit: Worsened: increase in crying, feeling overwhelmed, not being able to complete daily activities. Bouts of depression she is seeing a therapist.     She stopped taking Zoloft about 2 months ago.      See PHQ-9 for current symptoms.  Other associated symptoms: None    Complicating factors:   Significant life event:  Yes- pt just started going to adult children of alcoholics. She has seen an increase in her father's drinking and she is concerned/worried. 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   Current substance abuse:  None  Anxiety or Panic symptoms:  No    PHQ-9  English PHQ-9   Any Language            Amount of exercise or physical activity: could use motivation to do more exercises. She takes yoga once a week,  Bike rides and short walks.     Problems taking medications regularly: No    Medication side effects: none    Diet: regular (no restrictions) and gluten-free/reduced and no corn. She has been diagnosed with GERD.   She is also doing spiritual searching     She has been worried that going back on an antidepressant would \"block spiritual messages\".  She did feel like Zoloft worked well for her.        Problem list and histories reviewed & adjusted, as indicated.  Additional history: as documented        Reviewed and updated as needed this visit by clinical staff  Allergies  Meds       Reviewed and updated as needed this visit by Provider         ROS:  C: NEGATIVE for fever, chills, change in weight  E/M: NEGATIVE for ear, mouth and throat problems  R: NEGATIVE for significant cough or SOB  CV: NEGATIVE for chest pain, palpitations or peripheral edema  GI: NEGATIVE for nausea, abdominal pain, heartburn, or change in bowel habits  MUSCULOSKELETAL: NEGATIVE for significant " arthralgias or myalgia  NEURO: NEGATIVE for weakness, dizziness or paresthesias  ENDOCRINE: NEGATIVE for temperature intolerance, skin/hair changes  PSYCHIATRIC: see HPI    OBJECTIVE:                                                    BP 95/64  Pulse 69  Temp 97.9  F (36.6  C) (Oral)  Resp 18  Wt 158 lb 2 oz (71.7 kg)  LMP 07/13/2008  SpO2 96%  BMI 23.69 kg/m2  Body mass index is 23.69 kg/(m^2).  GENERAL: healthy, alert and no distress  PSYCH: mentation appears normal, affect slightly flattened; PHQ_9 score of 8         ASSESSMENT/PLAN:                                                            1. Major depressive disorder, recurrent episode, mild (H)  Restart Zoloft - see pt instructions.  Follow up in one month.   - sertraline (ZOLOFT) 50 MG tablet; Take one and 1/2 tablets daily  Dispense: 135 tablet; Refill: PRN    2. Insomnia, unspecified  The current medical regimen is effective;  continue present plan and medications.   - traZODone (DESYREL) 100 MG tablet; Take 1 tablet (100 mg) by mouth At Bedtime Take 1 1/2  tablets by mouth at bedtime for sleep  Dispense: 135 tablet; Refill: PRN    More than 35 minutes spent with patient today, >50% in counseling regarding her depression    Ai De La Paz NP  [facility]

## 2017-06-20 NOTE — NURSING NOTE
"Chief Complaint   Patient presents with     Depression       Initial BP 95/64  Pulse 69  Temp 97.9  F (36.6  C) (Oral)  Resp 18  Wt 158 lb 2 oz (71.7 kg)  LMP 07/13/2008  SpO2 96%  BMI 23.69 kg/m2 Estimated body mass index is 23.69 kg/(m^2) as calculated from the following:    Height as of 4/5/17: 5' 8.5\" (1.74 m).    Weight as of this encounter: 158 lb 2 oz (71.7 kg).  Medication Reconciliation: complete       Mara Santos MA      "

## 2017-06-20 NOTE — MR AVS SNAPSHOT
After Visit Summary   6/20/2017    Debbie Richardson    MRN: [id number]           Patient Information     Date Of Birth          1955        Visit Information        Provider Department      6/20/2017 11:00 AM Ai De La Paz NP [facility]        Today's Diagnoses     Major depressive disorder, recurrent episode, mild (H)        Insomnia, unspecified          Care Instructions    Start Zoloft 1/2 tablet daily for one week then increase to one tablet daily for one week then increase to one and 1/2 tablets daily    Follow up in one month.           Follow-ups after your visit        Who to contact     If you have questions or need follow up information about today's clinic visit or your schedule please contact [facility] directly at [phone].  Normal or non-critical lab and imaging results will be communicated to you by MyChart, letter or phone within 4 business days after the clinic has received the results. If you do not hear from us within 7 days, please contact the clinic through MyChart or phone. If you have a critical or abnormal lab result, we will notify you by phone as soon as possible.  Submit refill requests through [employer]Views or call your pharmacy and they will forward the refill request to us. Please allow 3 business days for your refill to be completed.          Additional Information About Your Visit        MyChart Information     [employer] gives you secure access to your electronic health record. If you see a primary care provider, you can also send messages to your care team and make appointments. If you have questions, please call your primary care clinic.  If you do not have a primary care provider, please call [phone] and they will assist you.        Care EveryWhere ID     This is your Care EveryWhere ID. This could be used by other organizations to access your [place] medical records  [id number]        Your Vitals Were      Pulse Temperature Respirations Last Period Pulse Oximetry BMI (Body Mass Index)    69 97.9  F (36.6  C) (Oral) 18 07/13/2008 96% 23.69 kg/m2       Blood Pressure from Last 3 Encounters:   06/20/17 95/64   05/10/17 99/65   04/05/17 97/67    Weight from Last 3 Encounters:   06/20/17 158 lb 2 oz (71.7 kg)   05/10/17 161 lb (73 kg)   04/05/17 159 lb (72.1 kg)              Today, you had the following     No orders found for display         Today's Medication Changes          These changes are accurate as of: 6/20/17 12:08 PM.  If you have any questions, ask your nurse or doctor.               These medicines have changed or have updated prescriptions.        Dose/Directions    traZODone 100 MG tablet   Commonly known as:  DESYREL   This may have changed:  additional instructions   Used for:  Insomnia, unspecified   Changed by:  Ai De La Paz NP        Dose:  100 mg   Take 1 tablet (100 mg) by mouth At Bedtime Take 1 1/2  tablets by mouth at bedtime for sleep   Quantity:  135 tablet   Refills:  PRN            Where to get your medicines      These medications were sent to [facility] - [place], [place] - [address] [facility]  [address], [place] [address]     Phone:  [phone]     sertraline 50 MG tablet    traZODone 100 MG tablet                Primary Care Provider Office Phone # Fax #    Ai De La Paz [phone]       [facility] [address] [facility]  [facility] [address]        Thank you!     Thank you for choosing [facility]  for your care. Our goal is always to provide you with excellent care. Hearing back from our patients is one way we can continue to improve our services. Please take a few minutes to complete the written survey that you may receive in the mail after your visit with us. Thank you!             Your Updated Medication List - Protect others around you: Learn how to safely use, store and throw away your medicines at  www.disposemymeds.org.          This list is accurate as of: 6/20/17 12:08 PM.  Always use your most recent med list.                   Brand Name Dispense Instructions for use    conjugated estrogens cream    PREMARIN    30 g    Place 1.5 g vaginally three times a week Use 1.5 gm vaginally 2-3 times a weekUse 1.5 gm vaginally 2-3 times a week       MULTI VITAMIN PO          sertraline 50 MG tablet    ZOLOFT    135 tablet    Take one and 1/2 tablets daily       traZODone 100 MG tablet    DESYREL    135 tablet    Take 1 tablet (100 mg) by mouth At Bedtime Take 1 1/2  tablets by mouth at bedtime for sleep

## 2017-06-20 NOTE — PATIENT INSTRUCTIONS
Start Zoloft 1/2 tablet daily for one week then increase to one tablet daily for one week then increase to one and 1/2 tablets daily    Follow up in one month.

## 2017-06-21 ASSESSMENT — PATIENT HEALTH QUESTIONNAIRE - PHQ9: SUM OF ALL RESPONSES TO PHQ QUESTIONS 1-9: 8

## 2017-12-18 ENCOUNTER — OFFICE VISIT (OUTPATIENT)
Dept: OPTOMETRY | Facility: CLINIC | Age: 62
End: 2017-12-18
Payer: COMMERCIAL

## 2017-12-18 DIAGNOSIS — H52.13 MYOPIA OF BOTH EYES WITH ASTIGMATISM: Primary | ICD-10-CM

## 2017-12-18 DIAGNOSIS — H52.203 MYOPIA OF BOTH EYES WITH ASTIGMATISM: Primary | ICD-10-CM

## 2017-12-18 DIAGNOSIS — H52.4 PRESBYOPIA: ICD-10-CM

## 2017-12-18 PROCEDURE — 92004 COMPRE OPH EXAM NEW PT 1/>: CPT | Performed by: OPTOMETRIST

## 2017-12-18 PROCEDURE — 92015 DETERMINE REFRACTIVE STATE: CPT | Performed by: OPTOMETRIST

## 2017-12-18 ASSESSMENT — REFRACTION_WEARINGRX
OS_CYLINDER: +1.75
OD_ADD: +2.50
OS_ADD: +2.50
OD_CYLINDER: +1.00
OS_AXIS: 150
OS_SPHERE: -3.00
SPECS_TYPE: PAL
OD_SPHERE: -2.00
OD_AXIS: 010

## 2017-12-18 ASSESSMENT — REFRACTION_MANIFEST
OS_CYLINDER: +1.75
OS_CYLINDER: +2.00
METHOD_AUTOREFRACTION: 1
OD_SPHERE: -2.25
OS_AXIS: 165
OS_ADD: +2.75
OS_AXIS: 161
OS_SPHERE: -2.75
OD_CYLINDER: +1.75
OD_CYLINDER: +1.75
OD_SPHERE: -2.50
OD_AXIS: 020
OD_ADD: +2.75
OS_SPHERE: -3.00
OD_AXIS: 011

## 2017-12-18 ASSESSMENT — VISUAL ACUITY
OD_SC: 20/70
METHOD: SNELLEN - LINEAR
CORRECTION_TYPE: GLASSES
OD_CC: 20/20
OS_CC: 20/30
OS_CC: 20/30
OS_SC: 20/80
OD_CC: 20/30

## 2017-12-18 ASSESSMENT — TONOMETRY
OS_IOP_MMHG: 18
OD_IOP_MMHG: 18
IOP_METHOD: APPLANATION

## 2017-12-18 ASSESSMENT — KERATOMETRY
OD_AXISANGLE2_DEGREES: 139
OS_AXISANGLE2_DEGREES: 51
OD_K1POWER_DIOPTERS: 43.37
METHOD_AUTO_MANUAL: AUTOMATED
OS_AXISANGLE_DEGREES: 141
OD_AXISANGLE_DEGREES: 49
OD_K2POWER_DIOPTERS: 44.37
OS_K1POWER_DIOPTERS: 43.62
OS_K2POWER_DIOPTERS: 45.00

## 2017-12-18 ASSESSMENT — SLIT LAMP EXAM - LIDS: COMMENTS: MEIBOMIAN GLAND DYSFUNCTION

## 2017-12-18 ASSESSMENT — CUP TO DISC RATIO
OD_RATIO: 0.1
OS_RATIO: 0.15

## 2017-12-18 ASSESSMENT — CONF VISUAL FIELD
OD_NORMAL: 1
OS_NORMAL: 1

## 2017-12-18 ASSESSMENT — EXTERNAL EXAM - LEFT EYE: OS_EXAM: NORMAL

## 2017-12-18 ASSESSMENT — EXTERNAL EXAM - RIGHT EYE: OD_EXAM: NORMAL

## 2017-12-18 NOTE — PROGRESS NOTES
Chief Complaint   Patient presents with     COMPREHENSIVE EYE EXAM        Last Eye Exam:1/2013 4+  Dilated Previously: Yes    What are you currently using to see?  glasses       Distance Vision Acuity: Satisfied with vision    Near Vision Acuity: Satisfied with vision while reading  with glasses    Eye Comfort: good  No dry eye problems  Do you use eye drops? : No  Occupation or Hobbies: Reading              Medical, surgical and family histories reviewed and updated 12/18/2017.       OBJECTIVE: See Ophthalmology exam    ASSESSMENT:    ICD-10-CM    1. Myopia of both eyes with astigmatism H52.13 EYE EXAM (SIMPLE-NONBILLABLE)    H52.203 REFRACTION   2. Presbyopia H52.4 EYE EXAM (SIMPLE-NONBILLABLE)     REFRACTION    asymptomatic mgd   PLAN:   Updated prescription with small change    Lara Navarrete OD

## 2017-12-18 NOTE — PATIENT INSTRUCTIONS
Meibomian gland dysfunction or Posterior Blepharitis, is characterized by inflammation along both the uppper and lower eyelid margins. A single row of these glands is present in each lid with openings along the lid margins.  It is often found in association with skin conditions such as rosacea and seborrheic dermatitis.    Symptoms include:  ?Red eyes  ?Gritty or burning sensation  ?Burning sensation  ?Excessive tearing  ?Itchy eyelids  ?Red, swollen eyelids  ?Crusting or matting of eyelashes in the morning  ?Light sensitivity  ?Blurred vision    It is important to keep cosmetics from blocking these oil glands. If blocked, they do not   excrete oil into the tear film, which causes the tears to evaporate quickly.   This may result in watery eyes.  There is also an increase of bacterial growth when the tear film is unstable, leading to further ocular surface inflammation.    Warm compresses are very beneficial to the normal functioning of the eye.  Warm compresses for 5-10 minutes twice daily and keeping the lid margins clean  and help maintain a good tear film.   Moisten a washcloth with hot water, or microwave for 10 seconds, being careful to not get the cloth too hot.   Then put the washcloth onto your eyelids for 5 minutes. It will cool quickly so a rice pack or eyemask that can be heated and laid on top of the washcloth will help retain the heat.    Omega 3 fatty acid supplements taken once to twice daily and artificial tears such as Soothe xp, Refresh optive , and systane balance are also an additional treatment to control inflammation and help soothe your eyes.     slight prescription change in both eyes

## 2017-12-18 NOTE — MR AVS SNAPSHOT
After Visit Summary   12/18/2017    Debbie Richardson    MRN: [id number]           Patient Information     Date Of Birth          1955        Visit Information        Provider Department      12/18/2017 2:00 PM Lara Navarrete OD [facility]        Today's Diagnoses     Myopia of both eyes with astigmatism    -  1    Presbyopia          Care Instructions    Meibomian gland dysfunction or Posterior Blepharitis, is characterized by inflammation along both the uppper and lower eyelid margins. A single row of these glands is present in each lid with openings along the lid margins.  It is often found in association with skin conditions such as rosacea and seborrheic dermatitis.    Symptoms include:  ?Red eyes  ?Gritty or burning sensation  ?Burning sensation  ?Excessive tearing  ?Itchy eyelids  ?Red, swollen eyelids  ?Crusting or matting of eyelashes in the morning  ?Light sensitivity  ?Blurred vision    It is important to keep cosmetics from blocking these oil glands. If blocked, they do not   excrete oil into the tear film, which causes the tears to evaporate quickly.   This may result in watery eyes.  There is also an increase of bacterial growth when the tear film is unstable, leading to further ocular surface inflammation.    Warm compresses are very beneficial to the normal functioning of the eye.  Warm compresses for 5-10 minutes twice daily and keeping the lid margins clean  and help maintain a good tear film.   Moisten a washcloth with hot water, or microwave for 10 seconds, being careful to not get the cloth too hot.   Then put the washcloth onto your eyelids for 5 minutes. It will cool quickly so a rice pack or eyemask that can be heated and laid on top of the washcloth will help retain the heat.    Omega 3 fatty acid supplements taken once to twice daily and artificial tears such as Soothe xp, Refresh optive , and systane balance are also an additional treatment  to control inflammation and help soothe your eyes.     slight prescription change in both eyes            Follow-ups after your visit        Follow-up notes from your care team     Return in about 1 year (around 12/18/2018) for Annual Visit.      Your next 10 appointments already scheduled     Jan 03, 2018 10:30 AM CST   MyChart Long with Ai De La Paz NP   [facility]    [address] [place] [id number] [phone]              Who to contact     If you have questions or need follow up information about today's clinic visit or your schedule please contact [facility] directly at [phone].  Normal or non-critical lab and imaging results will be communicated to you by MyChart, letter or phone within 4 business days after the clinic has received the results. If you do not hear from us within 7 days, please contact the clinic through [employer] or phone. If you have a critical or abnormal lab result, we will notify you by phone as soon as possible.  Submit refill requests through eBoox or call your pharmacy and they will forward the refill request to us. Please allow 3 business days for your refill to be completed.          Additional Information About Your Visit        MyChart Information     [employer] gives you secure access to your electronic health record. If you see a primary care provider, you can also send messages to your care team and make appointments. If you have questions, please call your primary care clinic.  If you do not have a primary care provider, please call [phone] and they will assist you.        Care EveryWhere ID     This is your Care EveryWhere ID. This could be used by other organizations to access your [place] medical records  [id number]        Your Vitals Were     Last Period                   07/13/2008            Blood Pressure from Last 3 Encounters:   06/20/17 95/64   05/10/17 99/65   04/05/17 97/67     Weight from Last 3 Encounters:   06/20/17 71.7 kg (158 lb 2 oz)   05/10/17 73 kg (161 lb)   04/05/17 72.1 kg (159 lb)              We Performed the Following     EYE EXAM (SIMPLE-NONBILLABLE)     REFRACTION        Primary Care Provider Office Phone # Fax #    Ai De La Paz [phone] [address] [facility] [address]        Equal Access to Services     JENNY OCHOA : Hadii aad ku hadasho Soomaali, waaxda luqadaha, qaybta kaalmada adeegyada, waxay idiin hayaan adeeg kharash la'aan . So [facility] [phone].    ATENCIÓN: Si habla español, tiene a linares disposición servicios gratuitos de asistencia lingüística. [facility] [phone].    We comply with applicable federal civil rights laws and [place] laws. We do not discriminate on the basis of race, color, national origin, age, disability, sex, sexual orientation, or gender identity.            Thank you!     Thank you for choosing [facility]  for your care. Our goal is always to provide you with excellent care. Hearing back from our patients is one way we can continue to improve our services. Please take a few minutes to complete the written survey that you may receive in the mail after your visit with us. Thank you!             Your Updated Medication List - Protect others around you: Learn how to safely use, store and throw away your medicines at www.disposemymeds.org.          This list is accurate as of: 12/18/17  2:51 PM.  Always use your most recent med list.                   Brand Name Dispense Instructions for use Diagnosis    conjugated estrogens cream    PREMARIN    30 g    Place 1.5 g vaginally three times a week Use 1.5 gm vaginally 2-3 times a weekUse 1.5 gm vaginally 2-3 times a week    Vaginal atrophy       MULTI VITAMIN PO           sertraline 50 MG tablet    ZOLOFT    135 tablet    Take one and 1/2 tablets daily    Major depressive disorder, recurrent episode, mild (H)       traZODone 100 MG tablet     DESYREL    135 tablet    Take 1 tablet (100 mg) by mouth At Bedtime Take 1 1/2  tablets by mouth at bedtime for sleep    Insomnia, unspecified

## 2018-01-03 ENCOUNTER — OFFICE VISIT (OUTPATIENT)
Dept: FAMILY MEDICINE | Facility: CLINIC | Age: 63
End: 2018-01-03
Payer: COMMERCIAL

## 2018-01-03 VITALS
RESPIRATION RATE: 18 BRPM | SYSTOLIC BLOOD PRESSURE: 110 MMHG | TEMPERATURE: 97.9 F | OXYGEN SATURATION: 98 % | WEIGHT: 162 LBS | BODY MASS INDEX: 24.55 KG/M2 | HEART RATE: 72 BPM | DIASTOLIC BLOOD PRESSURE: 56 MMHG | HEIGHT: 68 IN

## 2018-01-03 DIAGNOSIS — Z23 NEED FOR PROPHYLACTIC VACCINATION AND INOCULATION AGAINST INFLUENZA: ICD-10-CM

## 2018-01-03 DIAGNOSIS — G47.00 INSOMNIA, UNSPECIFIED TYPE: ICD-10-CM

## 2018-01-03 DIAGNOSIS — R04.0 EPISTAXIS: ICD-10-CM

## 2018-01-03 DIAGNOSIS — F33.42 MAJOR DEPRESSIVE DISORDER, RECURRENT EPISODE, IN FULL REMISSION (H): Primary | ICD-10-CM

## 2018-01-03 DIAGNOSIS — M85.80 OSTEOPENIA, UNSPECIFIED LOCATION: ICD-10-CM

## 2018-01-03 DIAGNOSIS — N95.2 VAGINAL ATROPHY: ICD-10-CM

## 2018-01-03 PROCEDURE — 90471 IMMUNIZATION ADMIN: CPT | Performed by: NURSE PRACTITIONER

## 2018-01-03 PROCEDURE — 90686 IIV4 VACC NO PRSV 0.5 ML IM: CPT | Performed by: NURSE PRACTITIONER

## 2018-01-03 PROCEDURE — 99214 OFFICE O/P EST MOD 30 MIN: CPT | Mod: 25 | Performed by: NURSE PRACTITIONER

## 2018-01-03 RX ORDER — TRAZODONE HYDROCHLORIDE 100 MG/1
150 TABLET ORAL AT BEDTIME
Qty: 135 TABLET | Status: SHIPPED | OUTPATIENT
Start: 2018-01-03 | End: 2019-02-25

## 2018-01-03 ASSESSMENT — ANXIETY QUESTIONNAIRES
5. BEING SO RESTLESS THAT IT IS HARD TO SIT STILL: NOT AT ALL
7. FEELING AFRAID AS IF SOMETHING AWFUL MIGHT HAPPEN: NOT AT ALL
2. NOT BEING ABLE TO STOP OR CONTROL WORRYING: NOT AT ALL
3. WORRYING TOO MUCH ABOUT DIFFERENT THINGS: NOT AT ALL
6. BECOMING EASILY ANNOYED OR IRRITABLE: NOT AT ALL
GAD7 TOTAL SCORE: 0
IF YOU CHECKED OFF ANY PROBLEMS ON THIS QUESTIONNAIRE, HOW DIFFICULT HAVE THESE PROBLEMS MADE IT FOR YOU TO DO YOUR WORK, TAKE CARE OF THINGS AT HOME, OR GET ALONG WITH OTHER PEOPLE: NOT DIFFICULT AT ALL
1. FEELING NERVOUS, ANXIOUS, OR ON EDGE: NOT AT ALL

## 2018-01-03 ASSESSMENT — PATIENT HEALTH QUESTIONNAIRE - PHQ9
SUM OF ALL RESPONSES TO PHQ QUESTIONS 1-9: 1
5. POOR APPETITE OR OVEREATING: NOT AT ALL

## 2018-01-03 NOTE — PROGRESS NOTES
"  SUBJECTIVE:   Debbie Richardson is a 62 year old female who presents to clinic today for the following health issues:    Referral   Bloody nose w/pressure  She did see ENT years ago for cauterization.   She has been getting nosebleeds with washing her face about twice weekly for the past several months.      Discuss next Bone density follow up   Last DEXA was 11/14.  She has been taking calcium with vitamin D.     She restarted Trazodone and Zoloft and mood is great, sleeping well.  She denies any side effects.         Problem list and histories reviewed & adjusted, as indicated.  Additional history: as documented        Reviewed and updated as needed this visit by clinical staff  Tobacco  Allergies  Meds  Med Hx  Surg Hx  Fam Hx  Soc Hx      Reviewed and updated as needed this visit by Provider         ROS:  C: NEGATIVE for fever, chills, change in weight  ENT/MOUTH: see HPI  R: NEGATIVE for significant cough or SOB  CV: NEGATIVE for chest pain, palpitations or peripheral edema  GI: NEGATIVE for nausea, abdominal pain, heartburn, or change in bowel habits  MUSCULOSKELETAL: NEGATIVE for significant arthralgias or myalgia  NEURO: NEGATIVE for weakness, dizziness or paresthesias  PSYCHIATRIC: NEGATIVE for changes in mood or affect    OBJECTIVE:     /56  Pulse 72  Temp 97.9  F (36.6  C) (Oral)  Resp 18  Ht 5' 8\" (1.727 m)  Wt 162 lb (73.5 kg)  LMP 07/13/2008  SpO2 98%  BMI 24.63 kg/m2  Body mass index is 24.63 kg/(m^2).  GENERAL: healthy, alert and no distress  PSYCH: mentation appears normal, affect normal/bright' PHQ-9 score of 1        ASSESSMENT/PLAN:             1. Major depressive disorder, recurrent episode, in full remission  In remission  The current medical regimen is effective;  continue present plan and medications.   - sertraline (ZOLOFT) 50 MG tablet; Take one and 1/2 tablets daily  Dispense: 135 tablet; Refill: PRN    2. Vaginal atrophy  Refills given.   - conjugated " estrogens (PREMARIN) cream; Place 1.5 g vaginally three times a week Use 1.5 gm vaginally 2-3 times a weekUse 1.5 gm vaginally 2-3 times a week  Dispense: 30 g; Refill: PRN    3. Insomnia, unspecified type  The current medical regimen is effective;  continue present plan and medications.   - traZODone (DESYREL) 100 MG tablet; Take 1.5 tablets (150 mg) by mouth At Bedtime Take 1 1/2  tablets by mouth at bedtime for sleep  Dispense: 135 tablet; Refill: PRN    4. Osteopenia, unspecified location  Discussed weight-bearing exercise.    - DX Hip/Pelvis/Spine; Future    5. Need for prophylactic vaccination and inoculation against influenza    - HC FLU VAC PRESRV FREE QUAD SPLIT VIR 3+YRS IM  - ADMIN 1st VACCINE    6. Epistaxis    - OTOLARYNGOLOGY REFERRAL        Ai De La Paz NP  [facility]    Injectable Influenza Immunization Documentation  Prior to injection verified patient identity using patient's name and date of birth.  Due to injection administration, patient instructed to remain in clinic for 15 minutes  afterwards, and to report any adverse reaction to me immediately.      1.  Is the person to be vaccinated sick today?   No    2. Does the person to be vaccinated have an allergy to a component   of the vaccine?   No  Egg Allergy Algorithm Link    3. Has the person to be vaccinated ever had a serious reaction   to influenza vaccine in the past?   No    4. Has the person to be vaccinated ever had Guillain-Barré syndrome?   No    Form completed by Mara Santos MA

## 2018-01-03 NOTE — MR AVS SNAPSHOT
After Visit Summary   1/3/2018    Debbie Richardson    MRN: [id number]           Patient Information     Date Of Birth          1955        Visit Information        Provider Department      1/3/2018 10:30 AM Ai De La Paz NP [facility]        Today's Diagnoses     Major depressive disorder, recurrent episode, mild (H)    -  1    Vaginal atrophy        Insomnia, unspecified type        Osteopenia, unspecified location        Need for prophylactic vaccination and inoculation against influenza        Epistaxis           Follow-ups after your visit        Additional Services     OTOLARYNGOLOGY REFERRAL       Your provider has referred you to: [facility]: [facility] (Otolaryngology) - [place] [phone]  [email or web address]    Please be aware that coverage of these services is subject to the terms and limitations of your health insurance plan.  Call member services at your health plan with any benefit or coverage questions.      Please bring the following with you to your appointment:    (1) Any X-Rays, CTs or MRIs which have been performed.  Contact the facility where they were done to arrange for  prior to your scheduled appointment.   (2) List of current medications  (3) This referral request   (4) Any documents/labs given to you for this referral                  Future tests that were ordered for you today     Open Future Orders        Priority Expected Expires Ordered    DX Hip/Pelvis/Spine Routine  1/3/2019 1/3/2018            Who to contact     If you have questions or need follow up information about today's clinic visit or your schedule please contact [facility] directly at [phone].  Normal or non-critical lab and imaging results will be communicated to you by MyChart, letter or phone within 4 business days after the clinic has received the results. If you do  "not hear from us within 7 days, please contact the clinic through [employer] or phone. If you have a critical or abnormal lab result, we will notify you by phone as soon as possible.  Submit refill requests through [employer] or call your pharmacy and they will forward the refill request to us. Please allow 3 business days for your refill to be completed.          Additional Information About Your Visit        [employer] Information     [employer] gives you secure access to your electronic health record. If you see a primary care provider, you can also send messages to your care team and make appointments. If you have questions, please call your primary care clinic.  If you do not have a primary care provider, please call [phone] and they will assist you.        Care EveryWhere ID     This is your Care EveryWhere ID. This could be used by other organizations to access your [place] medical records  [id number]        Your Vitals Were     Pulse Temperature Respirations Height Last Period Pulse Oximetry    72 97.9  F (36.6  C) (Oral) 18 5' 8\" (1.727 m) 07/13/2008 98%    BMI (Body Mass Index)                   24.63 kg/m2            Blood Pressure from Last 3 Encounters:   01/03/18 110/56   06/20/17 95/64   05/10/17 99/65    Weight from Last 3 Encounters:   01/03/18 162 lb (73.5 kg)   06/20/17 158 lb 2 oz (71.7 kg)   05/10/17 161 lb (73 kg)              We Performed the Following     ADMIN 1st VACCINE     HC FLU VAC PRESRV FREE QUAD SPLIT VIR 3+YRS IM     OTOLARYNGOLOGY REFERRAL          Today's Medication Changes          These changes are accurate as of: 1/3/18  1:25 PM.  If you have any questions, ask your nurse or doctor.               These medicines have changed or have updated prescriptions.        Dose/Directions    traZODone 100 MG tablet   Commonly known as:  DESYREL   This may have changed:  how much to take   Used for:  Insomnia, unspecified type   Changed by:  Ai De La Paz, NP        Dose:  150 mg   Take " 1.5 tablets (150 mg) by mouth At Bedtime Take 1 1/2  tablets by mouth at bedtime for sleep   Quantity:  135 tablet   Refills:  PRN            Where to get your medicines      These medications were sent to [facility] - [place], [place] - [address]  [address], [place] [address]     Phone:  [phone]     conjugated estrogens cream    sertraline 50 MG tablet    traZODone 100 MG tablet                Primary Care Provider Office Phone # Fax #    Ai De La Paz [phone]       [address] [address]  [facility] [address]        Equal Access to Services     JENNY OCHOA : Hadii aad ku hadasho Soomaali, waaxda luqadaha, qaybta kaalmada adeegyada, waxradha walls . So [facility] [phone].    ATENCIÓN: Si habla español, tiene a linares disposición servicios gratuitos de asistencia lingüística. [facility] [phone].    We comply with applicable federal civil rights laws and [place] laws. We do not discriminate on the basis of race, color, national origin, age, disability, sex, sexual orientation, or gender identity.            Thank you!     Thank you for choosing [facility]  for your care. Our goal is always to provide you with excellent care. Hearing back from our patients is one way we can continue to improve our services. Please take a few minutes to complete the written survey that you may receive in the mail after your visit with us. Thank you!             Your Updated Medication List - Protect others around you: Learn how to safely use, store and throw away your medicines at www.disposemymeds.org.          This list is accurate as of: 1/3/18  1:25 PM.  Always use your most recent med list.                   Brand Name Dispense Instructions for use Diagnosis    conjugated estrogens cream    PREMARIN    30 g    Place 1.5 g vaginally three times a week Use 1.5 gm vaginally 2-3 times a weekUse 1.5 gm vaginally 2-3 times a week     Vaginal atrophy       MULTI VITAMIN PO           sertraline 50 MG tablet    ZOLOFT    135 tablet    Take one and 1/2 tablets daily    Major depressive disorder, recurrent episode, mild (H)       traZODone 100 MG tablet    DESYREL    135 tablet    Take 1.5 tablets (150 mg) by mouth At Bedtime Take 1 1/2  tablets by mouth at bedtime for sleep    Insomnia, unspecified type

## 2018-01-04 ASSESSMENT — ANXIETY QUESTIONNAIRES: GAD7 TOTAL SCORE: 0

## 2018-01-09 PROBLEM — F33.42 MAJOR DEPRESSIVE DISORDER, RECURRENT EPISODE, IN FULL REMISSION (H): Status: ACTIVE | Noted: 2018-01-09

## 2018-05-24 ENCOUNTER — THERAPY VISIT (OUTPATIENT)
Dept: PHYSICAL THERAPY | Facility: CLINIC | Age: 63
End: 2018-05-24
Payer: COMMERCIAL

## 2018-05-24 DIAGNOSIS — G89.29 CHRONIC BILATERAL LOW BACK PAIN WITHOUT SCIATICA: Primary | ICD-10-CM

## 2018-05-24 DIAGNOSIS — M54.50 CHRONIC BILATERAL LOW BACK PAIN WITHOUT SCIATICA: Primary | ICD-10-CM

## 2018-05-24 PROCEDURE — 97161 PT EVAL LOW COMPLEX 20 MIN: CPT | Mod: GP | Performed by: PHYSICAL THERAPIST

## 2018-05-24 PROCEDURE — 97110 THERAPEUTIC EXERCISES: CPT | Mod: GP | Performed by: PHYSICAL THERAPIST

## 2018-05-24 NOTE — PROGRESS NOTES
[facility] Initial Evaluation  Subjective:  Patient is a 62 year old female presenting with rehab back hpi.   Debbie Richardson is a 62 year old female with a lumbar condition.  Condition occurred with:  Repetition/overuse.  Condition occurred: in the community.  This is a chronic condition  4/30/18. Patient reports onset of low back pain about 6 years ago with repetitive lifting and twisting. She had PT at that time and her low back improved. She continues to have episodic low back pain with activities such as gardening. She experienced flare up of LBP the morning after bowling in late April 2018. She was referred to PT on 4/30/18..    Patient reports pain:  Central lumbar spine.    Pain is described as sharp and other (strong, dull) and is intermittent and reported as 7/10.  Associated symptoms:  Loss of motion/stiffness. Pain is worse in the A.M..  Symptoms are exacerbated by bending, lifting, standing and twisting Relieved by: sitting with good posture.  Since onset symptoms are gradually improving.    Previous treatment includes chiropractic and physical therapy.    General health as reported by patient is good.  Pertinent medical history includes:  History of fractures, depression, implanted device, menopausal and sleep disorder/apnea.  Medical allergies: yes (metal).  Other surgeries include:  Other (hysterectomy/mesh implant).  Current medications:  Hormone replacement therapy, sleep medication and anti-depressants.  Current occupation is retired.        Barriers include:  None as reported by the patient.    Red flags:  None as reported by the patient.                        Objective:  Standing Alignment:    Cervical/Thoracic:  Forward head  Shoulder/UE:  Rounded shoulders                             Lumbar/SI Evaluation  ROM:    AROM Lumbar:   Flexion:          WNL -  Ext:                    WNL -   Side Bend:        Left:     Right:   Rotation:           Left:     Right:   Side  Glide:        Left:  WNL -    Right:  WNL -                                                                           Roya Lumbar Evaluation    Posture:  Sitting: fair  Standing: good  Lordosis: WNL  Lateral Shift: nil  Correction of Posture: better      Test Movements:      RAMONE: During: no effect  After: no effect    Repeat RAMONE: During: no effect  After: no effect  Mechanical Response: no effect  EIL: During: produces  After: no worse    Repeat EIL: During: produces  After: no worse  Mechanical Response: no effect        Conclusion lumbar: testing derangment.  Principle of Treatment:  Posture Correction: posture correction  Flexion: testing repeated flexion                                             ROS    Assessment/Plan:    Patient is a 62 year old female with lumbar complaints.    Patient has the following significant findings with corresponding treatment plan.                Diagnosis 1:  Somatic dysfunction of the thoracic, lumbar, and sacral region   Pain -  hot/cold therapy, manual therapy, self management, education, directional preference exercise and home program  Decreased ROM/flexibility - manual therapy, therapeutic exercise and home program  Decreased proprioception - neuro re-education, therapeutic activities and home program  Impaired muscle performance - neuro re-education and home program  Decreased function - therapeutic activities and home program  Impaired posture - neuro re-education and home program    Therapy Evaluation Codes:   1) History comprised of:   Personal factors that impact the plan of care:      Time since onset of symptoms.    Comorbidity factors that impact the plan of care are:      Depression, Implanted device, Menopausal and Sleep disorder/apnea.     Medications impacting care: Anti-depressant, Sleep and Hormone replacement.  2) Examination of Body Systems comprised of:   Body structures and functions that impact the plan of care:      Lumbar spine.   Activity  limitations that impact the plan of care are:      Bathing, Bending, Cooking, Dressing, Lifting, Sitting, Standing and Sleeping.  3) Clinical presentation characteristics are:   Stable/Uncomplicated.  4) Decision-Making    Low complexity using standardized patient assessment instrument and/or measureable assessment of functional outcome.  Cumulative Therapy Evaluation is: Low complexity.    Previous and current functional limitations:  (See Goal Flow Sheet for this information)    Short term and Long term goals: (See Goal Flow Sheet for this information)     Communication ability:  Patient appears to be able to clearly communicate and understand verbal and written communication and follow directions correctly.  Treatment Explanation - The following has been discussed with the patient:   RX ordered/plan of care  Anticipated outcomes  Possible risks and side effects  This patient would benefit from PT intervention to resume normal activities.   Rehab potential is good.    Frequency:  1 X week, once daily  Duration:  for 6 weeks tapering to 1 X every other week over 4 weeks  Discharge Plan:  Achieve all LTG.  Independent in home treatment program.  Reach maximal therapeutic benefit.    Please refer to the daily flowsheet for treatment today, total treatment time and time spent performing 1:1 timed codes.

## 2018-05-24 NOTE — LETTER
[facility] PHYSICAL [facility]  [address] [address]  [phone]  May 30, 2018    Re: Debbie Richardson   :   1955  MRN:  [id number]   REFERRING PHYSICIAN:   Marycruz Washburn DC    [facility]    Date of Initial Evaluation:  18  Visits:  Rxs Used: 1  Reason for Referral:  Chronic bilateral low back pain without sciatica    EVALUATION SUMMARY    [facility]    Subjective:  Patient is a 62 year old female presenting with rehab back hpi.   Debbie Richardson is a 62 year old female with a lumbar condition.  Condition occurred with:  Repetition/overuse.  Condition occurred: in the community.  This is a chronic condition  18. Patient reports onset of low back pain about 6 years ago with repetitive lifting and twisting. She had PT at that time and her low back improved. She continues to have episodic low back pain with activities such as gardening. She experienced flare up of LBP the morning after bowling in late 2018. She was referred to PT on 18..    Patient reports pain:  Central lumbar spine.    Pain is described as sharp and other (strong, dull) and is intermittent and reported as 7/10.  Associated symptoms:  Loss of motion/stiffness. Pain is worse in the A.M..  Symptoms are exacerbated by bending, lifting, standing and twisting Relieved by: sitting with good posture.  Since onset symptoms are gradually improving.    Previous treatment includes chiropractic and physical therapy.    General health as reported by patient is good.  Pertinent medical history includes:  History of fractures, depression, implanted device, menopausal and sleep disorder/apnea.  Medical allergies: yes (metal).  Other surgeries include:  Other (hysterectomy/mesh implant).  Current medications:  Hormone replacement therapy, sleep medication and  anti-depressants.  Current occupation is retired.      Barriers include:  None as reported by the patient.  Red flags:  None as reported by the patient.    Objective:  Standing Alignment:    Cervical/Thoracic:  Forward head  Shoulder/UE:  Rounded shoulders    Lumbar/SI Evaluation  ROM:    AROM Lumbar:   Flexion:          WNL -  Ext:                    WNL -   Side Bend:        Left:     Right:   Re: Debbie Richardson   :   1955    Rotation:           Left:     Right:   Side Glide:        Left:  WNL -    Right:  WNL -    Roya Lumbar Evaluation  Posture:  Sitting: fair  Standing: good  Lordosis: WNL  Lateral Shift: nil  Correction of Posture: better    Test Movements:  RAMONE: During: no effect  After: no effect    Repeat RAMONE: During: no effect  After: no effect  Mechanical Response: no effect  EIL: During: produces  After: no worse    Repeat EIL: During: produces  After: no worse  Mechanical Response: no effect  Conclusion lumbar: testing derangment.    Principle of Treatment:  Posture Correction: posture correction  Flexion: testing repeated flexion    Assessment/Plan:    Patient is a 62 year old female with lumbar complaints.    Patient has the following significant findings with corresponding treatment plan.                Diagnosis 1:  Somatic dysfunction of the thoracic, lumbar, and sacral region   Pain -  hot/cold therapy, manual therapy, self management, education, directional preference exercise and home program  Decreased ROM/flexibility - manual therapy, therapeutic exercise and home program  Decreased proprioception - neuro re-education, therapeutic activities and home program  Impaired muscle performance - neuro re-education and home program  Decreased function - therapeutic activities and home program  Impaired posture - neuro re-education and home program    Therapy Evaluation Codes:   1) History comprised of:   Personal factors that impact the plan of care:      Time since onset of  symptoms.    Comorbidity factors that impact the plan of care are:      Depression, Implanted device, Menopausal and Sleep disorder/apnea.     Medications impacting care: Anti-depressant, Sleep and Hormone replacement.  2) Examination of Body Systems comprised of:   Body structures and functions that impact the plan of care:      Lumbar spine.   Activity limitations that impact the plan of care are:      Bathing, Bending, Cooking, Dressing, Lifting, Sitting, Standing and Sleeping.  3) Clinical presentation characteristics are:   Stable/Uncomplicated.  4) Decision-Making    Re: Debbie Richardson   :   1955      Low complexity using standardized patient assessment instrument and/or   measureable assessment of functional outcome.    Cumulative Therapy Evaluation is: Low complexity.    Previous and current functional limitations:  (See Goal Flow Sheet for this information)    Short term and Long term goals: (See Goal Flow Sheet for this information)     Communication ability:  Patient appears to be able to clearly communicate and understand verbal and written communication and follow directions correctly.  Treatment Explanation - The following has been discussed with the patient:   RX ordered/plan of care  Anticipated outcomes  Possible risks and side effects  This patient would benefit from PT intervention to resume normal activities.   Rehab potential is good.    Frequency:  1 X week, once daily  Duration:  for 6 weeks tapering to 1 X every other week over 4 weeks  Discharge Plan:  Achieve all LTG.  Independent in home treatment program.  Reach maximal therapeutic benefit.    Please refer to the daily flowsheet for treatment today, total treatment time and time spent performing 1:1 timed codes.         Thank you for your referral.    INQUIRIES  Therapist: Chaz Webb DPT  [facility] PHYSICAL THERAP[facility]  [address] [place] [address]  Phone:  [phone]  Fax: [phone]

## 2018-05-24 NOTE — MR AVS SNAPSHOT
After Visit Summary   5/24/2018    Debbie Richardson    MRN: [id number]           Patient Information     Date Of Birth          1955        Visit Information        Provider Department      5/24/2018 12:40 PM Chaz Webb, PT [facility] [place]        Today's Diagnoses     Chronic bilateral low back pain without sciatica    -  1       Follow-ups after your visit        Your next 10 appointments already scheduled     May 31, 2018 11:30 AM CDT   HEATHER Spine with Chaz Webb PT   [facility] ([facility]  )    [address] [place] [id number]   [phone]            Jun 07, 2018 11:30 AM CDT   HEATHER Spine with Chaz Webb PT   [facility] [place] ([facility]  )    [address] [place] [id number] [phone]              Who to contact     If you have questions or need follow up information about today's clinic visit or your schedule please contact [facility] directly at [phone].  Normal or non-critical lab and imaging results will be communicated to you by MyChart, letter or phone within 4 business days after the clinic has received the results. If you do not hear from us within 7 days, please contact the clinic through elicithart or phone. If you have a critical or abnormal lab result, we will notify you by phone as soon as possible.  Submit refill requests through Neema or call your pharmacy and they will forward the refill request to us. Please allow 3 business days for your refill to be completed.          Additional Information About Your Visit        MyChart Information     Neema gives you secure access to your electronic health record. If you see a primary care provider, you can also send messages to your care team and make appointments.  If you have questions, please call your primary care clinic.  If you do not have a primary care provider, please call [phone] and they will assist you.        Care EveryWhere ID     This is your Care EveryWhere ID. This could be used by other organizations to access your [place] medical records  [id number]        Your Vitals Were     Last Period                   07/13/2008            Blood Pressure from Last 3 Encounters:   01/03/18 110/56   06/20/17 95/64   05/10/17 99/65    Weight from Last 3 Encounters:   01/03/18 73.5 kg (162 lb)   06/20/17 71.7 kg (158 lb 2 oz)   05/10/17 73 kg (161 lb)              We Performed the Following     HEATHER Inital Eval Report     PT Eval, Low Complexity (72190)     Therapeutic Exercises        Primary Care Provider Office Phone # Fax #    Ai CRIS De La Paz [phone]       [address] [facility] [address]        Equal Access to Services     JENNY OCHOA : Hadii aad ku hadasho Soomaali, waaxda luqadaha, qaybta kaalmada adeegyada, waxay idiin hayaan adeeg kharaignacia la'deyaniran . So [facility] [phone].    ATENCIÓN: Si habla español, tiene a linares disposición servicios gratuitos de asistencia lingüística. [facility] [phone].    We comply with applicable federal civil rights laws and [place] laws. We do not discriminate on the basis of race, color, national origin, age, disability, sex, sexual orientation, or gender identity.            Thank you!     Thank you for choosing [facility]  for your care. Our goal is always to provide you with excellent care. Hearing back from our patients is one way we can continue to improve our services. Please take a few minutes to complete the written survey that you may receive in the mail after your visit with us. Thank you!             Your Updated Medication List - Protect others around you: Learn how to safely use, store and throw away your medicines at  www.disposemymeds.org.          This list is accurate as of 5/24/18 11:59 PM.  Always use your most recent med list.                   Brand Name Dispense Instructions for use Diagnosis    conjugated estrogens cream    PREMARIN    30 g    Place 1.5 g vaginally three times a week Use 1.5 gm vaginally 2-3 times a weekUse 1.5 gm vaginally 2-3 times a week    Vaginal atrophy       MULTI VITAMIN PO           sertraline 50 MG tablet    ZOLOFT    135 tablet    Take one and 1/2 tablets daily    Major depressive disorder, recurrent episode, in full remission (H)       traZODone 100 MG tablet    DESYREL    135 tablet    Take 1.5 tablets (150 mg) by mouth At Bedtime Take 1 1/2  tablets by mouth at bedtime for sleep    Insomnia, unspecified type

## 2018-05-29 PROBLEM — G89.29 CHRONIC BILATERAL LOW BACK PAIN WITHOUT SCIATICA: Status: ACTIVE | Noted: 2018-05-29

## 2018-05-29 PROBLEM — M54.50 CHRONIC BILATERAL LOW BACK PAIN WITHOUT SCIATICA: Status: ACTIVE | Noted: 2018-05-29

## 2018-05-31 ENCOUNTER — THERAPY VISIT (OUTPATIENT)
Dept: PHYSICAL THERAPY | Facility: CLINIC | Age: 63
End: 2018-05-31
Payer: COMMERCIAL

## 2018-05-31 DIAGNOSIS — M54.50 CHRONIC BILATERAL LOW BACK PAIN WITHOUT SCIATICA: ICD-10-CM

## 2018-05-31 DIAGNOSIS — G89.29 CHRONIC BILATERAL LOW BACK PAIN WITHOUT SCIATICA: ICD-10-CM

## 2018-05-31 PROCEDURE — 97110 THERAPEUTIC EXERCISES: CPT | Mod: GP | Performed by: PHYSICAL THERAPIST

## 2018-05-31 PROCEDURE — 97112 NEUROMUSCULAR REEDUCATION: CPT | Mod: GP | Performed by: PHYSICAL THERAPIST

## 2018-05-31 NOTE — MR AVS SNAPSHOT
After Visit Summary   5/31/2018    Debbie Richardson    MRN: [id number]           Patient Information     Date Of Birth          1955        Visit Information        Provider Department      5/31/2018 11:30 AM Chaz Webb PT [facility]        Today's Diagnoses     Chronic bilateral low back pain without sciatica           Follow-ups after your visit        Who to contact     If you have questions or need follow up information about today's clinic visit or your schedule please contact [facility] directly at [phone].  Normal or non-critical lab and imaging results will be communicated to you by Literablyhart, letter or phone within 4 business days after the clinic has received the results. If you do not hear from us within 7 days, please contact the clinic through Literablyhart or phone. If you have a critical or abnormal lab result, we will notify you by phone as soon as possible.  Submit refill requests through [email or web address] or call your pharmacy and they will forward the refill request to us. Please allow 3 business days for your refill to be completed.          Additional Information About Your Visit        MyChart Information     [email or web address] gives you secure access to your electronic health record. If you see a primary care provider, you can also send messages to your care team and make appointments. If you have questions, please call your primary care clinic.  If you do not have a primary care provider, please call [phone] and they will assist you.        Care EveryWhere ID     This is your Care EveryWhere ID. This could be used by other organizations to access your [place] medical records  [id number]        Your Vitals Were     Last Period                   07/13/2008            Blood Pressure from Last 3 Encounters:   01/03/18 110/56   06/20/17 95/64   05/10/17 99/65    Weight  from Last 3 Encounters:   01/03/18 73.5 kg (162 lb)   06/20/17 71.7 kg (158 lb 2 oz)   05/10/17 73 kg (161 lb)              We Performed the Following     Neuromuscular Re-Education     Therapeutic Exercises        Primary Care Provider Office Phone # Fax #    Ai De La Paz [phone] [address] [facility] [address]        Equal Access to Services     JENNY OCHOA : Hadii aad ku hadasho Soomaali, waaxda luqadaha, qaybta kaalmada adeegyada, waxay idiin hayaan adeeg kharaignacia lahillary . So [facility] [phone].    ATENCIÓN: Si radha ferris, tiene a linares disposición servicios gratuitos de asistencia lingüística. [facility] [phone].    We comply with applicable federal civil rights laws and [place] laws. We do not discriminate on the basis of race, color, national origin, age, disability, sex, sexual orientation, or gender identity.            Thank you!     Thank you for choosing [facility] PHYSICAL THERAP[facility]  for your care. Our goal is always to provide you with excellent care. Hearing back from our patients is one way we can continue to improve our services. Please take a few minutes to complete the written survey that you may receive in the mail after your visit with us. Thank you!             Your Updated Medication List - Protect others around you: Learn how to safely use, store and throw away your medicines at www.disposemymeds.org.          This list is accurate as of 5/31/18  3:01 PM.  Always use your most recent med list.                   Brand Name Dispense Instructions for use Diagnosis    conjugated estrogens cream    PREMARIN    30 g    Place 1.5 g vaginally three times a week Use 1.5 gm vaginally 2-3 times a weekUse 1.5 gm vaginally 2-3 times a week    Vaginal atrophy       MULTI VITAMIN PO           sertraline 50 MG tablet    ZOLOFT    135 tablet    Take one and 1/2 tablets daily    Major depressive disorder, recurrent episode, in full  remission (H)       traZODone 100 MG tablet    DESYREL    135 tablet    Take 1.5 tablets (150 mg) by mouth At Bedtime Take 1 1/2  tablets by mouth at bedtime for sleep    Insomnia, unspecified type

## 2019-02-25 DIAGNOSIS — N95.2 VAGINAL ATROPHY: ICD-10-CM

## 2019-02-25 DIAGNOSIS — G47.00 INSOMNIA, UNSPECIFIED TYPE: ICD-10-CM

## 2019-02-25 NOTE — TELEPHONE ENCOUNTER
"Requested Prescriptions   Pending Prescriptions Disp Refills     traZODone (DESYREL) 100 MG tablet [Pharmacy Med Name: TRAZODONE HCL 100MG TABS]  Last Written Prescription Date:  1-3-18  Last Fill Quantity: 135 tab,  # refills: PRN   Last office visit: 1/3/2018 with prescribing provider:  Unique De La Paz    Future Office Visit:    135 tablet PRN     Sig: TAKE ONE AND ONE-HALF TABLETS BY MOUTH AT BEDTIME FOR SLEEP    Serotonin Modulators Failed - 2/25/2019 11:18 AM       Failed - Recent (12 mo) or future (30 days) visit within the authorizing provider's specialty    Patient had office visit in the last 12 months or has a visit in the next 30 days with authorizing provider or within the authorizing provider's specialty.  See \"Patient Info\" tab in inbasket, or \"Choose Columns\" in Meds & Orders section of the refill encounter.         Passed - Medication is active on med list       Passed - Patient is age 18 or older       Passed - No active pregnancy on record       Passed - No positive pregnancy test in past 12 months     ____________________________________         PREMARIN 0.625 MG/GM vaginal cream [Pharmacy Med Name: PREMARIN 0.625MG/GM CREA]  Last Written Prescription Date:  1-3-18  Last Fill Quantity: 30 g,  # refills: PRN   Last office visit: 1/3/2018 with prescribing provider:  Unique De La Paz    Future Office Visit:    30 g PRN     Sig: PLACE 1.5 GM VAGINALLY 2-3 TIMES A WEEK.    Hormone Replacement Therapy Failed - 2/25/2019 11:18 AM       Failed - Blood pressure under 140/90 in past 12 months    BP Readings from Last 3 Encounters:   01/03/18 110/56   06/20/17 95/64   05/10/17 99/65          Failed - Recent (12 mo) or future (30 days) visit within the authorizing provider's specialty    Patient had office visit in the last 12 months or has a visit in the next 30 days with authorizing provider or within the authorizing provider's specialty.  See \"Patient Info\" tab in inbasket, or \"Choose Columns\" in Meds & Orders " section of the refill encounter.           Passed - Patient has mammogram in past 2 years on file if age 50-75       Passed - Medication is active on med list       Passed - Patient is 18 years of age or older       Passed - No active pregnancy on record       Passed - No positive pregnancy test on record in past 12 months

## 2019-02-27 RX ORDER — CONJUGATED ESTROGENS 0.62 MG/G
CREAM VAGINAL
Qty: 30 G | Refills: 0 | Status: SHIPPED | OUTPATIENT
Start: 2019-02-27 | End: 2019-03-07

## 2019-02-27 RX ORDER — TRAZODONE HYDROCHLORIDE 100 MG/1
TABLET ORAL
Qty: 45 TABLET | Refills: 0 | Status: SHIPPED | OUTPATIENT
Start: 2019-02-27 | End: 2019-03-07

## 2019-02-27 NOTE — TELEPHONE ENCOUNTER
Patient due for annual physical. [employer] message sent to patient.   Patient informed that a medication refill was sent to their pharmacy.         Will await to see if patient reads [employer] message.       Belia PADILLA     [facility]

## 2019-02-27 NOTE — TELEPHONE ENCOUNTER
,  --Please contact patient  and ask her to schedule an annual office visit .    -- A refill order for below medication  was sent to the pharmacy.         Thank you,  Rena Salas RN      Prescription approved per [facility] Refill Protocol.  Rena Salas RN

## 2019-03-05 ASSESSMENT — ENCOUNTER SYMPTOMS
DIARRHEA: 0
WEAKNESS: 0
DIZZINESS: 0
PALPITATIONS: 0
HEMATURIA: 0
HEADACHES: 0
FREQUENCY: 1
NAUSEA: 0
CHILLS: 0
HEARTBURN: 0
CONSTIPATION: 0
SORE THROAT: 0
DYSURIA: 0
SHORTNESS OF BREATH: 0
NERVOUS/ANXIOUS: 0
FEVER: 0
PARESTHESIAS: 1
BREAST MASS: 0
ARTHRALGIAS: 0
ABDOMINAL PAIN: 0
JOINT SWELLING: 0
EYE PAIN: 0
HEMATOCHEZIA: 0
COUGH: 0
MYALGIAS: 0

## 2019-03-07 ENCOUNTER — OFFICE VISIT (OUTPATIENT)
Dept: FAMILY MEDICINE | Facility: CLINIC | Age: 64
End: 2019-03-07
Payer: COMMERCIAL

## 2019-03-07 VITALS
HEIGHT: 68 IN | DIASTOLIC BLOOD PRESSURE: 63 MMHG | RESPIRATION RATE: 18 BRPM | OXYGEN SATURATION: 98 % | BODY MASS INDEX: 24.55 KG/M2 | HEART RATE: 62 BPM | TEMPERATURE: 97.9 F | WEIGHT: 162 LBS | SYSTOLIC BLOOD PRESSURE: 102 MMHG

## 2019-03-07 DIAGNOSIS — R20.2 NUMBNESS AND TINGLING OF LEFT HAND: ICD-10-CM

## 2019-03-07 DIAGNOSIS — L30.4 INTERTRIGO: ICD-10-CM

## 2019-03-07 DIAGNOSIS — N95.2 VAGINAL ATROPHY: ICD-10-CM

## 2019-03-07 DIAGNOSIS — F33.42 MAJOR DEPRESSIVE DISORDER, RECURRENT EPISODE, IN FULL REMISSION (H): ICD-10-CM

## 2019-03-07 DIAGNOSIS — L82.1 SEBORRHEIC KERATOSIS: ICD-10-CM

## 2019-03-07 DIAGNOSIS — Z23 NEED FOR SHINGLES VACCINE: ICD-10-CM

## 2019-03-07 DIAGNOSIS — Z00.00 ENCOUNTER FOR ROUTINE ADULT HEALTH EXAMINATION WITHOUT ABNORMAL FINDINGS: Primary | ICD-10-CM

## 2019-03-07 DIAGNOSIS — G47.00 INSOMNIA, UNSPECIFIED TYPE: ICD-10-CM

## 2019-03-07 DIAGNOSIS — R20.0 NUMBNESS AND TINGLING OF LEFT HAND: ICD-10-CM

## 2019-03-07 PROCEDURE — 99396 PREV VISIT EST AGE 40-64: CPT | Mod: 25 | Performed by: NURSE PRACTITIONER

## 2019-03-07 PROCEDURE — 17110 DESTRUCTION B9 LES UP TO 14: CPT | Performed by: NURSE PRACTITIONER

## 2019-03-07 PROCEDURE — 99214 OFFICE O/P EST MOD 30 MIN: CPT | Mod: 25 | Performed by: NURSE PRACTITIONER

## 2019-03-07 RX ORDER — TRAZODONE HYDROCHLORIDE 100 MG/1
150 TABLET ORAL AT BEDTIME
Qty: 135 TABLET | Status: SHIPPED | OUTPATIENT
Start: 2019-03-07 | End: 2019-12-04

## 2019-03-07 ASSESSMENT — ENCOUNTER SYMPTOMS
BREAST MASS: 0
PARESTHESIAS: 1
EYE PAIN: 0
SORE THROAT: 0
CONSTIPATION: 0
NERVOUS/ANXIOUS: 0
DYSURIA: 0
JOINT SWELLING: 0
CHILLS: 0
DIZZINESS: 0
HEMATURIA: 0
WEAKNESS: 0
COUGH: 0
MYALGIAS: 0
NAUSEA: 0
PALPITATIONS: 0
ABDOMINAL PAIN: 0
HEADACHES: 0
FREQUENCY: 1
HEARTBURN: 0
SHORTNESS OF BREATH: 0
DIARRHEA: 0
HEMATOCHEZIA: 0
ARTHRALGIAS: 0
FEVER: 0

## 2019-03-07 ASSESSMENT — PATIENT HEALTH QUESTIONNAIRE - PHQ9
5. POOR APPETITE OR OVEREATING: NOT AT ALL
SUM OF ALL RESPONSES TO PHQ QUESTIONS 1-9: 2

## 2019-03-07 ASSESSMENT — ANXIETY QUESTIONNAIRES
5. BEING SO RESTLESS THAT IT IS HARD TO SIT STILL: NOT AT ALL
7. FEELING AFRAID AS IF SOMETHING AWFUL MIGHT HAPPEN: NOT AT ALL
1. FEELING NERVOUS, ANXIOUS, OR ON EDGE: NOT AT ALL
6. BECOMING EASILY ANNOYED OR IRRITABLE: NOT AT ALL
2. NOT BEING ABLE TO STOP OR CONTROL WORRYING: NOT AT ALL
GAD7 TOTAL SCORE: 0
IF YOU CHECKED OFF ANY PROBLEMS ON THIS QUESTIONNAIRE, HOW DIFFICULT HAVE THESE PROBLEMS MADE IT FOR YOU TO DO YOUR WORK, TAKE CARE OF THINGS AT HOME, OR GET ALONG WITH OTHER PEOPLE: NOT DIFFICULT AT ALL
3. WORRYING TOO MUCH ABOUT DIFFERENT THINGS: NOT AT ALL

## 2019-03-07 ASSESSMENT — MIFFLIN-ST. JEOR: SCORE: 1343.33

## 2019-03-07 NOTE — PROGRESS NOTES
SUBJECTIVE:   CC: Debbie Richardson is an 63 year old woman who presents for preventive health visit.     Physical   Annual:     Getting at least 3 servings of Calcium per day:  Yes    Bi-annual eye exam:  Yes    Dental care twice a year:  Yes    Sleep apnea or symptoms of sleep apnea:  None    Diet:  Gluten-free/reduced    Frequency of exercise:  1 day/week    Duration of exercise:  15-30 minutes    Taking medications regularly:  Yes    Medication side effects:  Not applicable    Additional concerns today:  Yes    PHQ-2 Total Score: 1  Skin Check: Moles  Location: under R neck  She has a skin lesion that irritates her when it rubs on clothing that she would like removed    Rash    Duration: about a month on and off recently; present for years    Description  Location: left axilla  Itching: severe    Therapies tried and outcome: Nystatin cream. - Old Rx not working, Lavender oil didn't work   .  Arm Tingling     Duration: intermittent a couple of months     Maybe once a week for a few minutes    Description  Location: mostly in her left hand, can go into her middle finger  No weakness.  She did go to PT for frozen shoulder in the past. Partial rotator cuff tear.  Stiff neck for years. Tight muscle on the left side of her upper back.  Hx. fractured wrist 2 years ago  No chest pain or shortness of breath.     Therapies tried and outcome: none                   Today's PHQ-2 Score:   PHQ-2 ( 1999 Pfizer) 3/5/2019   Q1: Little interest or pleasure in doing things 0   Q2: Feeling down, depressed or hopeless 1   PHQ-2 Score 1   Q1: Little interest or pleasure in doing things Not at all   Q2: Feeling down, depressed or hopeless Several days   PHQ-2 Score 1       Abuse: Current or Past(Physical, Sexual or Emotional)- No  Do you feel safe in your environment? Yes    Social History     Tobacco Use     Smoking status: Never Smoker     Smokeless tobacco: Never Used   Substance Use Topics     Alcohol use: Yes      "Alcohol/week: 0.0 oz     Comment: Moderate drinker     Alcohol Use 3/5/2019   If you drink alcohol do you typically have greater than 3 drinks per day OR greater than 7 drinks per week? No       Reviewed orders with patient.  Reviewed health maintenance and updated orders accordingly - Yes          Pertinent mammograms are reviewed under the imaging tab.  History of abnormal Pap smear: Status post benign hysterectomy. Health Maintenance and Surgical History updated.  PAP / HPV 8/8/2008   PAP NIL     Reviewed and updated as needed this visit by clinical staff  Tobacco  Allergies  Meds  Med Hx  Surg Hx  Fam Hx  Soc Hx        Reviewed and updated as needed this visit by Provider            Review of Systems   Constitutional: Negative for chills and fever.   HENT: Negative for congestion, ear pain, hearing loss and sore throat.    Eyes: Negative for pain and visual disturbance.   Respiratory: Negative for cough and shortness of breath.    Cardiovascular: Negative for chest pain, palpitations and peripheral edema.   Gastrointestinal: Negative for abdominal pain, constipation, diarrhea, heartburn, hematochezia and nausea.   Breasts:  Negative for tenderness, breast mass and discharge.   Genitourinary: Positive for frequency and urgency. Negative for dysuria, genital sores, hematuria, pelvic pain, vaginal bleeding and vaginal discharge.   Musculoskeletal: Negative for arthralgias, joint swelling and myalgias.   Skin: Positive for rash.   Neurological: Positive for paresthesias. Negative for dizziness, weakness and headaches.   Psychiatric/Behavioral: Negative for mood changes. The patient is not nervous/anxious.           OBJECTIVE:   /63   Pulse 62   Temp 97.9  F (36.6  C) (Oral)   Resp 18   Ht 1.727 m (5' 8\")   Wt 73.5 kg (162 lb)   LMP 07/13/2008   SpO2 98%   BMI 24.63 kg/m    Physical Exam  GENERAL: healthy, alert and no distress  EYES: Eyes grossly normal to inspection, PERRL and conjunctivae and " sclerae normal  HENT: ear canals and TM's normal, nose and mouth without ulcers or lesions  NECK: no adenopathy, no asymmetry, masses, or scars and thyroid normal to palpation  RESP: lungs clear to auscultation - no rales, rhonchi or wheezes  BREAST: normal without masses, tenderness or nipple discharge and no palpable axillary masses or adenopathy  CV: regular rate and rhythm, normal S1 S2, no S3 or S4, no murmur, click or rub, no peripheral edema and peripheral pulses strong  ABDOMEN: soft, nontender, no hepatosplenomegaly, no masses and bowel sounds normal  MS: hypertonicity and tenderness left trapezius; no tenderness of the left lateral epicondyle; positive Tinel's on the left  SKIN: keratotic lesion right side of neck; mildly erythematous patch left axilla  NEURO: Normal strength and tone, sensory exam grossly normal, mentation intact and DTR's normal and symmetric UE; positive Tinel left wrist; negative Phalen's  PSYCH: mentation appears normal, affect normal/bright        ASSESSMENT/PLAN:   1. Encounter for routine adult health examination without abnormal findings    - GASTROENTEROLOGY ADULT REF PROCEDURE ONLY [facility] [phone]    2. Insomnia, unspecified type  Well-controlled  The current medical regimen is effective;  continue present plan and medications.   - traZODone (DESYREL) 100 MG tablet; Take 1.5 tablets (150 mg) by mouth At Bedtime  Dispense: 135 tablet; Refill: PRN    3. Major depressive disorder, recurrent episode, in full remission (H)  Well-controlled  The current medical regimen is effective;  continue present plan and medications.   - sertraline (ZOLOFT) 50 MG tablet; Take one and 1/2 tablets daily  Dispense: 135 tablet; Refill: PRN    4. Vaginal atrophy  Refills given.   - conjugated estrogens (PREMARIN) 0.625 MG/GM vaginal cream; Place 1.5 g vaginally three times a week  Dispense: 60 g; Refill: PRN    5. Seborrheic keratosis  Lesion treated with liquid nitrogen.  May return  "for re-treatment if not resolved in 2-3 weeks.   - DESTRUCT BENIGN LESION, UP TO 14    6. Need for shingles vaccine  Risks and benefits of this immunization were discussed with the patient including potential side effects.   - ZOSTER VACCINE RECOMBINANT ADJUVANTED IM NJX    7. Numbness and tingling of left hand  Possible early CTS, differential also includes radiculitis due to nerve impingement from tight muscles in neck/back; cervical radiculitis, impingement syndrome in shoulder.  Since symptoms are very mild and infrequent, will monitor.  If occurring more at night or early morning, try wearing wrist brace at night.  Consider getting a massage more regularly. If symptoms persist or worsen, will refer to neurology.     8. Intertrigo  Try using Nystatin and hydrocortisone 1:1 to area BID.       COUNSELING:  Reviewed preventive health counseling, as reflected in patient instructions    BP Readings from Last 1 Encounters:   03/07/19 102/63     Estimated body mass index is 24.63 kg/m  as calculated from the following:    Height as of this encounter: 1.727 m (5' 8\").    Weight as of this encounter: 73.5 kg (162 lb).           reports that  has never smoked. she has never used smokeless tobacco.      Counseling Resources:  ATP IV Guidelines  Pooled Cohorts Equation Calculator  Breast Cancer Risk Calculator  FRAX Risk Assessment  ICSI Preventive Guidelines  Dietary Guidelines for Americans, 2010  USDA's MyPlate  ASA Prophylaxis  Lung CA Screening    Ai De La Paz NP  [facility]  "

## 2019-03-07 NOTE — PATIENT INSTRUCTIONS
Colonoscopy scheduled for May 1st (Wednesday)  2019 at 9:00  Phone: [phone]  [facility] [address], [address]

## 2019-03-08 ASSESSMENT — ANXIETY QUESTIONNAIRES: GAD7 TOTAL SCORE: 0

## 2019-03-11 ENCOUNTER — ANCILLARY PROCEDURE (OUTPATIENT)
Dept: MAMMOGRAPHY | Facility: CLINIC | Age: 64
End: 2019-03-11
Attending: NURSE PRACTITIONER
Payer: COMMERCIAL

## 2019-03-11 DIAGNOSIS — Z12.31 VISIT FOR SCREENING MAMMOGRAM: ICD-10-CM

## 2019-03-11 PROCEDURE — 77067 SCR MAMMO BI INCL CAD: CPT

## 2019-05-01 ENCOUNTER — TRANSFERRED RECORDS (OUTPATIENT)
Dept: HEALTH INFORMATION MANAGEMENT | Facility: CLINIC | Age: 64
End: 2019-05-01

## 2019-06-03 ENCOUNTER — OFFICE VISIT (OUTPATIENT)
Dept: FAMILY MEDICINE | Facility: CLINIC | Age: 64
End: 2019-06-03
Payer: COMMERCIAL

## 2019-06-03 VITALS
DIASTOLIC BLOOD PRESSURE: 65 MMHG | RESPIRATION RATE: 18 BRPM | BODY MASS INDEX: 25.09 KG/M2 | SYSTOLIC BLOOD PRESSURE: 101 MMHG | TEMPERATURE: 97.8 F | WEIGHT: 165 LBS

## 2019-06-03 DIAGNOSIS — F33.42 MAJOR DEPRESSIVE DISORDER, RECURRENT EPISODE, IN FULL REMISSION (H): Primary | ICD-10-CM

## 2019-06-03 DIAGNOSIS — N95.2 VAGINAL ATROPHY: ICD-10-CM

## 2019-06-03 DIAGNOSIS — Z23 NEED FOR SHINGLES VACCINE: ICD-10-CM

## 2019-06-03 DIAGNOSIS — G47.00 INSOMNIA, UNSPECIFIED TYPE: ICD-10-CM

## 2019-06-03 PROCEDURE — 90471 IMMUNIZATION ADMIN: CPT | Performed by: NURSE PRACTITIONER

## 2019-06-03 PROCEDURE — 90750 HZV VACC RECOMBINANT IM: CPT | Performed by: NURSE PRACTITIONER

## 2019-06-03 PROCEDURE — 99214 OFFICE O/P EST MOD 30 MIN: CPT | Mod: 25 | Performed by: NURSE PRACTITIONER

## 2019-06-03 ASSESSMENT — ANXIETY QUESTIONNAIRES
7. FEELING AFRAID AS IF SOMETHING AWFUL MIGHT HAPPEN: NOT AT ALL
1. FEELING NERVOUS, ANXIOUS, OR ON EDGE: NOT AT ALL
GAD7 TOTAL SCORE: 0
5. BEING SO RESTLESS THAT IT IS HARD TO SIT STILL: NOT AT ALL
3. WORRYING TOO MUCH ABOUT DIFFERENT THINGS: NOT AT ALL
2. NOT BEING ABLE TO STOP OR CONTROL WORRYING: NOT AT ALL
IF YOU CHECKED OFF ANY PROBLEMS ON THIS QUESTIONNAIRE, HOW DIFFICULT HAVE THESE PROBLEMS MADE IT FOR YOU TO DO YOUR WORK, TAKE CARE OF THINGS AT HOME, OR GET ALONG WITH OTHER PEOPLE: NOT DIFFICULT AT ALL
6. BECOMING EASILY ANNOYED OR IRRITABLE: NOT AT ALL

## 2019-06-03 ASSESSMENT — PATIENT HEALTH QUESTIONNAIRE - PHQ9
SUM OF ALL RESPONSES TO PHQ QUESTIONS 1-9: 2
5. POOR APPETITE OR OVEREATING: NOT AT ALL

## 2019-06-03 NOTE — NURSING NOTE
Prior to injection, verified patient identity using patient's name and date of birth.  Due to injection administration, patient instructed to remain in clinic for 15 minutes  afterwards, and to report any adverse reaction to me immediately.    Screening Questionnaire for Adult Immunization    Are you sick today?   No   Do you have allergies to medications, food, a vaccine component or latex?   Corn makes pt have diarrhea    Have you ever had a serious reaction after receiving a vaccination?   No   Do you have a long-term health problem with heart disease, lung disease, asthma, kidney disease, metabolic disease (e.g. diabetes), anemia, or other blood disorder?   No   Do you have cancer, leukemia, HIV/AIDS, or any other immune system problem?   No   In the past 3 months, have you taken medications that affect  your immune system, such as prednisone, other steroids, or anticancer drugs; drugs for the treatment of rheumatoid arthritis, Crohn s disease, or psoriasis; or have you had radiation treatments?   No   Have you had a seizure, or a brain or other nervous system problem?   No   During the past year, have you received a transfusion of blood or blood     products, or been given immune (gamma) globulin or antiviral drug?   No   For women: Are you pregnant or is there a chance you could become        pregnant during the next month?   No   Have you received any vaccinations in the past 4 weeks?   No     Immunization questionnaire answers were all negative.        Per orders of Ai De La Paz, injection of Shingrix given by Mara Santos. Patient instructed to remain in clinic for 15 minutes afterwards, and to report any adverse reaction to me immediately.       Screening performed by Mara Santos on 6/3/2019 at 3:35 PM.

## 2019-06-03 NOTE — PROGRESS NOTES
"Subjective     Debbie Richardson is a 63 year old female who presents to clinic today for the following health issues:    HPI   Depression Followup    How are you doing with your depression since your last visit? No change, \"Managing emotions\" and coming up with strategies. Enjoying yoga and poetry     Working with a  for the past year.   cruise in April. Pt is working with a      Are you having other symptoms that might be associated with depression? No    Have you had a significant life event?  OTHER: loss of daughter's dog. 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 Pt's son lost his job; PTSD    Are you feeling anxious or having panic attacks?   No    Do you have any concerns with your use of alcohol or other drugs? No     She slowly tapered off of Zoloft over a 6 week period and has been off for the past month.  She is doing well off of the medication and denies any symptoms of depression.     She is still taking Trazodone, which works well.     She has questions about vaginal mesh that was removed by urology in 2010 and whether she should continue with Premarin vaginal cream.     Social History     Tobacco Use     Smoking status: Never Smoker     Smokeless tobacco: Never Used   Substance Use Topics     Alcohol use: Yes     Alcohol/week: 0.0 oz     Comment: Moderate drinker     Drug use: No     PHQ 6/20/2017 1/3/2018 3/7/2019   PHQ-9 Total Score 8 1 2   Q9: Thoughts of better off dead/self-harm past 2 weeks Not at all Not at all Not at all     SANCHEZ-7 SCORE 6/28/2016 1/3/2018 3/7/2019   Total Score - - -   Total Score 2 0 0           Suicide Assessment Five-step Evaluation and Treatment (SAFE-T)    Amount of exercise or physical activity: walking. Pt will start biking more.     Problems taking medications regularly: Pt stopped Zoloft     Medication side effects: none    Diet: regular (no restrictions)                Reviewed and updated as needed this visit by Provider     "     Review of Systems   ROS COMP: Constitutional, HEENT, cardiovascular, pulmonary, gi and gu systems are negative, except as otherwise noted.      Objective    LMP 07/13/2008   There is no height or weight on file to calculate BMI.  Physical Exam   GENERAL: healthy, alert and no distress  PSYCH: mentation appears normal, affect normal/bright            Assessment & Plan     1. Major depressive disorder, recurrent episode, in full remission (H)  In remission.  Monitor symptoms off of Zoloft.  Continue using coping tawny.    Follow up for annual exam or sooner if needed.    2. Insomnia, unspecified type  The current medical regimen is effective;  continue present plan and medications.     3. Vaginal atrophy  Reviewed urology notes from 2010.  It appears only extruded mesh was removed.  She can continue with Premarin vaginal cream, she can call if she would like to change to estrace cream or Vagifem.  Follow up with urology if she develops any problems with retained mesh.     4. Need for shingles vaccine    - ZOSTER VACCINE RECOMBINANT ADJUVANTED IM NJX  - ADMIN 1st VACCINE           No follow-ups on file.    Ai De La Paz NP  [facility]

## 2019-06-04 ASSESSMENT — ANXIETY QUESTIONNAIRES: GAD7 TOTAL SCORE: 0

## 2019-06-06 PROBLEM — G89.29 CHRONIC BILATERAL LOW BACK PAIN WITHOUT SCIATICA: Status: RESOLVED | Noted: 2018-05-29 | Resolved: 2019-06-06

## 2019-06-06 PROBLEM — M54.50 CHRONIC BILATERAL LOW BACK PAIN WITHOUT SCIATICA: Status: RESOLVED | Noted: 2018-05-29 | Resolved: 2019-06-06

## 2019-08-08 ENCOUNTER — DOCUMENTATION ONLY (OUTPATIENT)
Dept: FAMILY MEDICINE | Facility: CLINIC | Age: 64
End: 2019-08-08

## 2019-08-08 NOTE — PROGRESS NOTES
Reason for call:  Form   Our goal is to have forms completed within 72 hours, however some forms may require a visit or additional information.     Who is the form from? Patient  Where did the form come from? Patient or family brought in     What clinic location was the form placed at?   Where was the form placed? FOLDER Box/Folder  What number is listed as a contact on the form? [phone]    Phone call message - patient request for a letter, form or note:     Date needed: as soon as possible  [facility]  Has the patient signed a consent form for release of information? Not Applicable    Additional comments:     Type of letter, form or note: medical    Phone number to reach patient:  Cell number on file:    Telephone Information:   Mobile [phone]       Best Time:      Can we leave a detailed message on this number?  Not Applicable

## 2019-08-27 ENCOUNTER — NURSE TRIAGE (OUTPATIENT)
Dept: NURSING | Facility: CLINIC | Age: 64
End: 2019-08-27

## 2019-08-27 ENCOUNTER — OFFICE VISIT (OUTPATIENT)
Dept: PEDIATRICS | Facility: CLINIC | Age: 64
End: 2019-08-27
Payer: COMMERCIAL

## 2019-08-27 VITALS
SYSTOLIC BLOOD PRESSURE: 118 MMHG | OXYGEN SATURATION: 96 % | WEIGHT: 160.2 LBS | DIASTOLIC BLOOD PRESSURE: 70 MMHG | BODY MASS INDEX: 24.36 KG/M2 | HEART RATE: 89 BPM | TEMPERATURE: 98.3 F

## 2019-08-27 DIAGNOSIS — R10.13 EPIGASTRIC PAIN: ICD-10-CM

## 2019-08-27 DIAGNOSIS — K29.00 ACUTE GASTRITIS WITHOUT HEMORRHAGE, UNSPECIFIED GASTRITIS TYPE: Primary | ICD-10-CM

## 2019-08-27 PROCEDURE — 99214 OFFICE O/P EST MOD 30 MIN: CPT | Performed by: INTERNAL MEDICINE

## 2019-08-27 RX ORDER — OMEPRAZOLE 40 MG/1
40 CAPSULE, DELAYED RELEASE ORAL DAILY
Qty: 30 CAPSULE | Refills: 0 | Status: SHIPPED | OUTPATIENT
Start: 2019-08-27 | End: 2019-10-08

## 2019-08-27 NOTE — TELEPHONE ENCOUNTER
"Debbie is having abdominal pain.  Pain started on Friday with vomiting.  Denies vomiting today and no diarrhea.  Today pain is currently \"8\".  Debbie is requesting an appointment.  Debbie has a history of a mesh.      Additional Information    Negative: Passed out (i.e., fainted, collapsed and was not responding)    Negative: Shock suspected (e.g., cold/pale/clammy skin, too weak to stand, low BP, rapid pulse)    Negative: Sounds like a life-threatening emergency to the triager    Negative: SEVERE abdominal pain (e.g., excruciating)    Negative: Vomiting red blood or black (coffee ground) material    Negative: Bloody, black, or tarry bowel movements    Negative: Constant abdominal pain lasting > 2 hours    Negative: Vomiting bile (green color)    Negative: Patient sounds very sick or weak to the triager    Negative: Vomiting and abdomen looks much more swollen than usual    Negative: White of the eyes have turned yellow (i.e., jaundice)    Negative: Blood in urine (red, pink, or tea-colored)    Negative: Fever > 103 F (39.4 C)    Negative: Fever > 101 F (38.3 C) and over 60 years of age    Negative: Fever > 100.0 F (37.8 C) and has diabetes mellitus or a weak immune system (e.g., HIV positive, cancer chemotherapy, organ transplant, splenectomy, chronic steroids)    Negative: Fever > 100.0 F (37.8 C) and bedridden (e.g., nursing home patient, stroke, chronic illness, recovering from surgery)    Negative: Pregnant or could be pregnant (i.e., missed last menstrual period)    MODERATE OR MILD pain that comes and goes (cramps) lasts > 24 hours    Protocols used: ABDOMINAL PAIN - FEMALE-A-OH      "

## 2019-08-27 NOTE — PROGRESS NOTES
"Subjective     Debbie Richardson is a 63 year old female who presents to clinic today for the following health issues:    HPI   ABDOMINAL   PAIN     Onset: x1 day     Description:   Character: Burning and sore ness   Location: epigastric region  Radiation: lower     Intensity: moderate    Progression of Symptoms:  worsening    Accompanying Signs & Symptoms:  Fever/Chills?: no   Gas/Bloating: YES- bloating   Nausea: no   Vomitting: no   Diarrhea?: YES  Constipation:no   Dysuria or Hematuria: no    History:   Trauma: no   Previous similar pain: no    Previous tests done: none    Precipitating factors:   Does the pain change with:     Food: no      BM: no     Urination: no     Alleviating factors:  Na     Therapies Tried and outcome: Ibuprofen with no relief -fluid intake     LMP:  NA        She notes she had vomiting and diarrhea that started Saturday and resolved on Sunday.  Notes that she has had abdominal pain in epigastric area radiating to periumbilical area.  Pain is a burning pain.  Worse with walking and moving.  She does note that vomiting was \"violent\".  Pain is constant.  Pain does not wake her from sleep or keep her from sleeping.  Has tried drinking water, trying to walk.  Tried ibuprofen last night.  Appetite has been poor, ate a little oatmeal and a peach today.  Last vomiting was Saturday, last diarrhea on Sunday.  Some chills Friday/sat.  No blood in stools.  Does have a history of reflux, but hasn't had problems with that in quite a while.          Patient Active Problem List   Diagnosis     Insomnia     Acute gastritis     Slow transit constipation     IBS (irritable bowel syndrome)     Female genital symptoms     CARDIOVASCULAR SCREENING; LDL GOAL LESS THAN 160     Health Care Home     GERD (gastroesophageal reflux disease)     Advanced directives, counseling/discussion     Vaginal atrophy     Urinary urgency     Urgency incontinence     Cystocele, midline     Rectocele     Constipation     " Closed fracture of distal end of right radius with routine healing, unspecified fracture morphology, subsequent encounter     Acute right-sided thoracic back pain     Fracture of rib     Major depressive disorder, recurrent episode, in full remission (H)     Past Surgical History:   Procedure Laterality Date     BIOPSY  2009    Precancerous growth in uterus     COLONOSCOPY  2006    Clear     ESOPHAGOSCOPY, GASTROSCOPY, DUODENOSCOPY (EGD), COMBINED  6/25/2013    Procedure: COMBINED ESOPHAGOSCOPY, GASTROSCOPY, DUODENOSCOPY (EGD), BIOPSY SINGLE OR MULTIPLE;;  Surgeon: Jori Garcia MD;  Location: [facility]     HYSTERECTOMY, PAP NO LONGER INDICATED      mesh implant     ORTHOPEDIC SURGERY  2016    Cracked a rib in June; broke my right wrist in July     SURGICAL HISTORY OF -       oral surgery       Social History     Tobacco Use     Smoking status: Never Smoker     Smokeless tobacco: Never Used   Substance Use Topics     Alcohol use: Yes     Alcohol/week: 0.0 oz     Comment: Moderate drinker     Family History   Problem Relation Age of Onset     Diabetes Brother      Arthritis Sister         RA     Asthma Sister      Gastrointestinal Disease Sister         ulcer     Skin Cancer Sister      Thyroid Disease Sister      Other Cancer Sister         skin cancer     Alcohol/Drug Father         alcoholic     Hypertension Father      Substance Abuse Father         Alcohol addiction     Cancer Maternal Grandmother      Cancer Paternal Grandmother      Thyroid Disease Sister      Skin Cancer Sister      Other Cancer Sister         skin cancer     Hyperlipidemia Mother      Asthma Son      Gastrointestinal Disease Brother         gallbladder,also cousins and aunts     Depression Daughter         PTSD     Depression Maternal Aunt      Alcohol/Drug Maternal Aunt         alcoholic     Neurologic Disorder Son         ADHD/mild depression     Neurologic Disorder Brother         ADHD     Diabetes Brother      Depression Daughter       Anxiety Disorder Daughter         PTSD     Depression Other         m cousin who commited suicide     Depression Son      Asthma Son      Obesity Nephew      Mental Illness Cousin         Bipolar     Asthma Cousin      Depression Other      Diabetes Other      Asthma Other      Cerebrovascular Disease Other      C.A.D. No family hx of      Glaucoma No family hx of      Cerebrovascular Disease No family hx of      Breast Cancer No family hx of      Cancer - colorectal No family hx of      Macular Degeneration No family hx of          Current Outpatient Medications   Medication Sig Dispense Refill     omeprazole (PRILOSEC) 40 MG DR capsule Take 1 capsule (40 mg) by mouth daily 30 capsule 0     conjugated estrogens (PREMARIN) 0.625 MG/GM vaginal cream Place 1.5 g vaginally three times a week 60 g PRN     GLUCOSAMINE-CHONDROITIN PO Take by mouth daily       Multiple Vitamin (MULTI VITAMIN PO)        traZODone (DESYREL) 100 MG tablet Take 1.5 tablets (150 mg) by mouth At Bedtime 135 tablet PRN     Allergies   Allergen Reactions     Food      Strong sensitivity to Corn and mild sensitivity to gluten      Nkda [No Known Drug Allergies]      Metal [Avinash] Rash     Nickel Rash       Reviewed and updated as needed this visit by Provider         Review of Systems   ROS COMP: Constitutional, HEENT, cardiovascular, pulmonary, gi and gu systems are negative, except as otherwise noted.      Objective    /70 (BP Location: Right arm, Patient Position: Chair, Cuff Size: Adult Regular)   Pulse 89   Temp 98.3  F (36.8  C) (Oral)   Wt 72.7 kg (160 lb 3.2 oz)   LMP 07/13/2008   SpO2 96%   BMI 24.36 kg/m    Body mass index is 24.36 kg/m .  Physical Exam   GENERAL:  alert and no distress  RESP: lungs clear to auscultation - no rales, rhonchi or wheezes  CV: regular rate and rhythm, normal S1 S2, no S3 or S4, no murmur, click or rub, no peripheral edema and peripheral pulses strong  ABDOMEN: soft,  no hepatosplenomegaly, no  masses and bowel sounds normal. Tender epigastric area, no rebound or guarding.    MS: no gross musculoskeletal defects noted, no edema    Diagnostic Test Results:  none         Assessment & Plan     (K29.00) Acute gastritis without hemorrhage, unspecified gastritis type  (primary encounter diagnosis)  -pain most consistent with gastritis related to recent viral gastroenteritis  -will start prilosec for 1-2 weeks  -avoid NSAID's, acidic foods  Plan: omeprazole (PRILOSEC) 40 MG DR capsule       (R10.13) Epigastric pain  -likely also a component of musculoskeletal pain  -considered briefly the possibility of esophageal rupture considering severity of pain; however patient overall looks well and is afebrile with no hemoptysis.  If worsening however I would consider imaging.    Plan: omeprazole (PRILOSEC) 40 MG DR capsule             FUTURE APPOINTMENTS:       - Follow-up if worsening or not improving     No follow-ups on file.    Jaz Roberts MD  [facility]

## 2019-09-30 ENCOUNTER — HEALTH MAINTENANCE LETTER (OUTPATIENT)
Age: 64
End: 2019-09-30

## 2019-10-08 ENCOUNTER — OFFICE VISIT (OUTPATIENT)
Dept: FAMILY MEDICINE | Facility: CLINIC | Age: 64
End: 2019-10-08
Payer: COMMERCIAL

## 2019-10-08 VITALS
HEART RATE: 74 BPM | WEIGHT: 159 LBS | SYSTOLIC BLOOD PRESSURE: 98 MMHG | BODY MASS INDEX: 24.18 KG/M2 | OXYGEN SATURATION: 96 % | DIASTOLIC BLOOD PRESSURE: 64 MMHG | TEMPERATURE: 97.9 F

## 2019-10-08 DIAGNOSIS — Z81.8 FAMILY HISTORY OF STRESS: Primary | ICD-10-CM

## 2019-10-08 DIAGNOSIS — G47.00 INSOMNIA, UNSPECIFIED TYPE: ICD-10-CM

## 2019-10-08 DIAGNOSIS — Z23 NEED FOR SHINGLES VACCINE: ICD-10-CM

## 2019-10-08 DIAGNOSIS — Z23 NEED FOR PROPHYLACTIC VACCINATION AND INOCULATION AGAINST INFLUENZA: ICD-10-CM

## 2019-10-08 PROCEDURE — 90472 IMMUNIZATION ADMIN EACH ADD: CPT | Performed by: NURSE PRACTITIONER

## 2019-10-08 PROCEDURE — 90750 HZV VACC RECOMBINANT IM: CPT | Performed by: NURSE PRACTITIONER

## 2019-10-08 PROCEDURE — 90471 IMMUNIZATION ADMIN: CPT | Performed by: NURSE PRACTITIONER

## 2019-10-08 PROCEDURE — 90682 RIV4 VACC RECOMBINANT DNA IM: CPT | Performed by: NURSE PRACTITIONER

## 2019-10-08 PROCEDURE — 99214 OFFICE O/P EST MOD 30 MIN: CPT | Mod: 25 | Performed by: NURSE PRACTITIONER

## 2019-10-08 ASSESSMENT — PATIENT HEALTH QUESTIONNAIRE - PHQ9: SUM OF ALL RESPONSES TO PHQ QUESTIONS 1-9: 3

## 2019-10-08 NOTE — NURSING NOTE
Prior to immunization administration, verified patients identity using patient s name and date of birth. Please see Immunization Activity for additional information.     Screening Questionnaire for Adult Immunization    Are you sick today?   No   Do you have allergies to medications, food, a vaccine component or latex?   No   Have you ever had a serious reaction after receiving a vaccination?   No   Do you have a long-term health problem with heart disease, lung disease, asthma, kidney disease, metabolic disease (e.g. diabetes), anemia, or other blood disorder?   No   Do you have cancer, leukemia, HIV/AIDS, or any other immune system problem?   No   In the past 3 months, have you taken medications that affect  your immune system, such as prednisone, other steroids, or anticancer drugs; drugs for the treatment of rheumatoid arthritis, Crohn s disease, or psoriasis; or have you had radiation treatments?   No   Have you had a seizure, or a brain or other nervous system problem?   No   During the past year, have you received a transfusion of blood or blood     products, or been given immune (gamma) globulin or antiviral drug?   No   For women: Are you pregnant or is there a chance you could become        pregnant during the next month?   No   Have you received any vaccinations in the past 4 weeks?   No     Immunization questionnaire answers were all negative.        Per orders of Ai De La Paz, injection of Shingrix and flu shot given by Mara Santos. Patient instructed to remain in clinic for 15 minutes afterwards, and to report any adverse reaction to me immediately.       Screening performed by Maar Santos on 10/8/2019 at 2:56 PM.

## 2019-10-08 NOTE — PROGRESS NOTES
SUBJECTIVE:  Debbie Richardson, a 63 year old female scheduled an appointment to discuss the following issues:     Family history of stress  Insomnia, unspecified type  She has been under more stress lately, as her 35 year old son moved in 5 weeks ago after losing his job, girlfriend and house.  She suspects he has some addiction issues, but he is in denial and she is not sure how to approach this.  She has been going to [facility] and is looking into seeing a therapist.  She tapered off of Zoloft last spring and has been practicing yoga and other non-medication methods to reduce stress.  She is having some difficulty sleeping at times, despite taking 150 mg of Trazodone.   Need for prophylactic vaccination and inoculation against influenza  Need for shingles vaccine    Medical, social, surgical, and family histories reviewed.    ROS:  CONSTITUTIONAL: NEGATIVE for fever, chills  RESP: NEGATIVE for significant cough or SOB  CV: NEGATIVE for chest pain, palpitations   GI: NEGATIVE for nausea, abdominal pain, heartburn, or change in bowel habits  MUSCULOSKELETAL: NEGATIVE for significant arthralgias or myalgia  NEURO: NEGATIVE for weakness, dizziness or paresthesias or headache  PSYCHIATRIC: see HPI    OBJECTIVE:  BP 98/64   Pulse 74   Temp 97.9  F (36.6  C) (Oral)   Wt 72.1 kg (159 lb)   LMP 07/13/2008   SpO2 96%   BMI 24.18 kg/m    EXAM:  GENERAL APPEARANCE: healthy, alert and no distress  PSYCH: mentation appears normal and affect normal    ASSESSMENT/PLAN:  (Z81.8) Family history of stress  (primary encounter diagnosis)  Comment:   Plan: She will establish with a therapist.    She does not feel that she needs to go back on Zoloft at this time.      (G47.00) Insomnia, unspecified type  Comment:   Plan: She can try increasing Zoloft to 200 mg.  If this is helpful, I can change her prescription.  If it is not helpful, could consider Sonata 5 mg in the middle of the night as needed.     (Z23) Need for  prophylactic vaccination and inoculation against influenza  Comment:   Plan: INFLUENZA QUAD, RECOMBINANT, P-FREE (RIV4)         (FLUBLOCK) [95714], Vaccine Administration,         Initial [83506]            (Z23) Need for shingles vaccine  Comment:   Plan: ZOSTER VACCINE RECOMBINANT ADJUVANTED IM NJX,         EA ADD'L VACCINE

## 2019-11-27 ENCOUNTER — MYC MEDICAL ADVICE (OUTPATIENT)
Dept: FAMILY MEDICINE | Facility: CLINIC | Age: 64
End: 2019-11-27

## 2019-11-27 DIAGNOSIS — M54.50 BILATERAL LOW BACK PAIN WITHOUT SCIATICA, UNSPECIFIED CHRONICITY: Primary | ICD-10-CM

## 2019-11-27 RX ORDER — CYCLOBENZAPRINE HCL 10 MG
10 TABLET ORAL 3 TIMES DAILY PRN
Qty: 20 TABLET | Refills: 0 | Status: SHIPPED | OUTPATIENT
Start: 2019-11-27 | End: 2019-12-18

## 2019-11-27 NOTE — TELEPHONE ENCOUNTER
It's reasonable to try PT.  She should take Ibuprofen 600 mg TID for a week as well.  I sent in some Flexeril that she can take as needed.

## 2019-11-29 ENCOUNTER — THERAPY VISIT (OUTPATIENT)
Dept: PHYSICAL THERAPY | Facility: CLINIC | Age: 64
End: 2019-11-29
Attending: NURSE PRACTITIONER
Payer: COMMERCIAL

## 2019-11-29 DIAGNOSIS — M54.50 ACUTE BILATERAL LOW BACK PAIN WITHOUT SCIATICA: ICD-10-CM

## 2019-11-29 DIAGNOSIS — M54.50 BILATERAL LOW BACK PAIN WITHOUT SCIATICA, UNSPECIFIED CHRONICITY: ICD-10-CM

## 2019-11-29 PROCEDURE — 97161 PT EVAL LOW COMPLEX 20 MIN: CPT | Mod: GP | Performed by: PHYSICAL THERAPIST

## 2019-11-29 PROCEDURE — 97112 NEUROMUSCULAR REEDUCATION: CPT | Mod: GP | Performed by: PHYSICAL THERAPIST

## 2019-11-29 PROCEDURE — 97110 THERAPEUTIC EXERCISES: CPT | Mod: GP | Performed by: PHYSICAL THERAPIST

## 2019-11-29 NOTE — PROGRESS NOTES
[facility] Initial Evaluation  Subjective:  The history is provided by the patient. No  was used.   Type of problem:  Lumbar   Condition occurred with:  Repetition/overuse. This is a new condition   Problem details: 11/26/19. Patient started having LBP on 11/26/19 after Yoga class. She had started performing new strength training program on 11/25/19. She notes history of LBP about 7 years ago with bending and twisting while lifting. Patient also notes flare up of L upper back and shoulder pain and L UE tingling in the past couple days. She notes onset of L upper back, shoulder, and UE symptoms about 1 year ago..   Patient reports pain:  Lumbar spine right, lumbar spine left and central lumbar spine. Radiates to:  No radiation.  Symptoms are exacerbated by bending and other (transfering sit-to-stand, ascending stairs) and relieved by activity/movement and NSAID's.    Debbiepromise Vaughnsangita Richardson being seen for Low back.   Where condition occurred: during recreation / sport.  and reported as 7/10 (up to 10/10) on pain scale. General health as reported by patient is good. Pertinent medical history includes:  Depression, history of fractures, implanted device, incontinence, menopausal and numbness/tingling.    Surgeries include:  None.  Current medications:  Anti-inflammatory.   Primary job tasks include:  Lifting/carrying and prolonged sitting.  Pain is described as sharp and other (acute) and is constant. Pain is worse in the A.M.. Since onset symptoms are gradually improving.  Previous treatment includes chiropractic.    Patient is retired.   Barriers include:  None as reported by patient.  Red flags:  None as reported by patient.                      Objective:  Standing Alignment:    Cervical/Thoracic:  Forward head  Shoulder/UE:  Rounded shoulders  Lumbar:  Normal                           Lumbar/SI Evaluation  ROM:    AROM Lumbar:   Flexion:          Min loss -  Ext:                     Mod loss -/+   Side Bend:        Left:     Right:   Rotation:           Left:     Right:   Side Glide:        Left:  Mod loss +    Right:  WNL -                                                                           Roya Lumbar Evaluation    Posture:  Sitting: fair  Standing: fair  Lordosis: WNL  Lateral Shift: nil  Correction of Posture: better      Test Movements:  FIS: During: no effect  After: no effect  Pretest Movements: B LBP  Repeat FIS: During: no effect  After: no effect  Mechanical Response: DecrROM  EIS: During: no effect  After: no effect    Repeat EIS: During: no effect  After: no effect  Mechanical Response: IncROM    EIL: During: increases  After: no worse    Repeat EIL: During: decreases  After: better  Mechanical Response: IncROM      Static Tests:          Lying Prone in Extension: prone: increase, better w/ time; worse R low back, better L low back; ROM improved    Conclusion: derangement  Principle of Treatment:  Posture Correction: posture correction    Extension: REIL                                           ROS    Assessment/Plan:    Patient is a 64 year old female with lumbar complaints.    Patient has the following significant findings with corresponding treatment plan.                Diagnosis 1:  Low back pain  Pain -  hot/cold therapy, manual therapy, self management, education, directional preference exercise and home program  Decreased ROM/flexibility - manual therapy, therapeutic exercise and home program  Decreased proprioception - neuro re-education, therapeutic activities and home program  Impaired muscle performance - neuro re-education and home program  Decreased function - therapeutic activities and home program  Impaired posture - neuro re-education and home program    Therapy Evaluation Codes:   1) History comprised of:   Personal factors that impact the plan of care:      None.    Comorbidity factors that impact the plan of care are:      Depression,  Implanted device, Menopausal and Numbness/tingling.     Medications impacting care: Anti-inflammatory.  2) Examination of Body Systems comprised of:   Body structures and functions that impact the plan of care:      Lumbar spine.   Activity limitations that impact the plan of care are:      Bathing, Cooking, Dressing, Lifting, Sitting, Walking, Sleeping and [place].  3) Clinical presentation characteristics are:   Stable/Uncomplicated.  4) Decision-Making    Low complexity using standardized patient assessment instrument and/or measureable assessment of functional outcome.  Cumulative Therapy Evaluation is: Low complexity.    Previous and current functional limitations:  (See Goal Flow Sheet for this information)    Short term and Long term goals: (See Goal Flow Sheet for this information)     Communication ability:  Patient appears to be able to clearly communicate and understand verbal and written communication and follow directions correctly.  Treatment Explanation - The following has been discussed with the patient:   RX ordered/plan of care  Anticipated outcomes  Possible risks and side effects  This patient would benefit from PT intervention to resume normal activities.   Rehab potential is good.    Frequency:  1 X week, once daily  Duration:  for 6 weeks tapering to 1 X every other week over 4 weeks  Discharge Plan:  Achieve all LTG.  Independent in home treatment program.  Reach maximal therapeutic benefit.    Please refer to the daily flowsheet for treatment today, total treatment time and time spent performing 1:1 timed codes.

## 2019-12-03 ENCOUNTER — MYC MEDICAL ADVICE (OUTPATIENT)
Dept: FAMILY MEDICINE | Facility: CLINIC | Age: 64
End: 2019-12-03

## 2019-12-03 ENCOUNTER — TELEPHONE (OUTPATIENT)
Dept: FAMILY MEDICINE | Facility: CLINIC | Age: 64
End: 2019-12-03

## 2019-12-03 DIAGNOSIS — M79.622 PAIN OF LEFT UPPER ARM: Primary | ICD-10-CM

## 2019-12-03 DIAGNOSIS — G47.00 INSOMNIA, UNSPECIFIED TYPE: ICD-10-CM

## 2019-12-03 NOTE — TELEPHONE ENCOUNTER
----- Message from Chaz Webb, PT sent at 11/29/2019  6:10 PM CST -----  Regarding: Additional PT orders  Ai,    Thank you for referring Debbie to PT. In addition to her LBP, she is also experiencing pain in her L upper back and shoulder as well as tingling in her L arm. She would also benefit from PT for this problem. If you agree with this plan, please place an additional PT order in Epic.    Thank you!    Bernard Webb, PT

## 2019-12-04 RX ORDER — TRAZODONE HYDROCHLORIDE 100 MG/1
200 TABLET ORAL AT BEDTIME
Qty: 180 TABLET | Refills: 3 | Status: SHIPPED | OUTPATIENT
Start: 2019-12-04 | End: 2021-01-07

## 2019-12-05 ENCOUNTER — THERAPY VISIT (OUTPATIENT)
Dept: PHYSICAL THERAPY | Facility: CLINIC | Age: 64
End: 2019-12-05
Payer: COMMERCIAL

## 2019-12-05 DIAGNOSIS — M79.622 PAIN OF LEFT UPPER ARM: ICD-10-CM

## 2019-12-05 DIAGNOSIS — M54.50 ACUTE BILATERAL LOW BACK PAIN WITHOUT SCIATICA: ICD-10-CM

## 2019-12-05 PROCEDURE — 97110 THERAPEUTIC EXERCISES: CPT | Mod: GP | Performed by: PHYSICAL THERAPIST

## 2019-12-05 PROCEDURE — 97112 NEUROMUSCULAR REEDUCATION: CPT | Mod: GP | Performed by: PHYSICAL THERAPIST

## 2019-12-13 ENCOUNTER — THERAPY VISIT (OUTPATIENT)
Dept: PHYSICAL THERAPY | Facility: CLINIC | Age: 64
End: 2019-12-13
Payer: COMMERCIAL

## 2019-12-13 DIAGNOSIS — M54.50 ACUTE BILATERAL LOW BACK PAIN WITHOUT SCIATICA: ICD-10-CM

## 2019-12-13 PROCEDURE — 97112 NEUROMUSCULAR REEDUCATION: CPT | Mod: GP | Performed by: PHYSICAL THERAPIST

## 2019-12-13 PROCEDURE — 97530 THERAPEUTIC ACTIVITIES: CPT | Mod: GP | Performed by: PHYSICAL THERAPIST

## 2019-12-13 PROCEDURE — 97110 THERAPEUTIC EXERCISES: CPT | Mod: GP | Performed by: PHYSICAL THERAPIST

## 2019-12-18 ENCOUNTER — OFFICE VISIT (OUTPATIENT)
Dept: FAMILY MEDICINE | Facility: CLINIC | Age: 64
End: 2019-12-18
Payer: COMMERCIAL

## 2019-12-18 VITALS
WEIGHT: 158.5 LBS | TEMPERATURE: 97.6 F | OXYGEN SATURATION: 98 % | HEIGHT: 68 IN | HEART RATE: 64 BPM | RESPIRATION RATE: 16 BRPM | BODY MASS INDEX: 24.02 KG/M2 | SYSTOLIC BLOOD PRESSURE: 116 MMHG | DIASTOLIC BLOOD PRESSURE: 72 MMHG

## 2019-12-18 DIAGNOSIS — F34.1 PERSISTENT DEPRESSIVE DISORDER: Primary | ICD-10-CM

## 2019-12-18 PROCEDURE — 99214 OFFICE O/P EST MOD 30 MIN: CPT | Performed by: NURSE PRACTITIONER

## 2019-12-18 PROCEDURE — 96127 BRIEF EMOTIONAL/BEHAV ASSMT: CPT | Performed by: NURSE PRACTITIONER

## 2019-12-18 RX ORDER — SERTRALINE HYDROCHLORIDE 100 MG/1
100 TABLET, FILM COATED ORAL DAILY
Qty: 30 TABLET | Refills: 5 | Status: SHIPPED | OUTPATIENT
Start: 2019-12-18 | End: 2020-01-15

## 2019-12-18 ASSESSMENT — ANXIETY QUESTIONNAIRES
4. TROUBLE RELAXING: SEVERAL DAYS
1. FEELING NERVOUS, ANXIOUS, OR ON EDGE: SEVERAL DAYS
5. BEING SO RESTLESS THAT IT IS HARD TO SIT STILL: NOT AT ALL
GAD7 TOTAL SCORE: 6
7. FEELING AFRAID AS IF SOMETHING AWFUL MIGHT HAPPEN: SEVERAL DAYS
GAD7 TOTAL SCORE: 6
2. NOT BEING ABLE TO STOP OR CONTROL WORRYING: NOT AT ALL
3. WORRYING TOO MUCH ABOUT DIFFERENT THINGS: SEVERAL DAYS
GAD7 TOTAL SCORE: 6
6. BECOMING EASILY ANNOYED OR IRRITABLE: MORE THAN HALF THE DAYS
7. FEELING AFRAID AS IF SOMETHING AWFUL MIGHT HAPPEN: SEVERAL DAYS

## 2019-12-18 ASSESSMENT — PATIENT HEALTH QUESTIONNAIRE - PHQ9
SUM OF ALL RESPONSES TO PHQ QUESTIONS 1-9: 9
SUM OF ALL RESPONSES TO PHQ QUESTIONS 1-9: 9
10. IF YOU CHECKED OFF ANY PROBLEMS, HOW DIFFICULT HAVE THESE PROBLEMS MADE IT FOR YOU TO DO YOUR WORK, TAKE CARE OF THINGS AT HOME, OR GET ALONG WITH OTHER PEOPLE: SOMEWHAT DIFFICULT

## 2019-12-18 ASSESSMENT — MIFFLIN-ST. JEOR: SCORE: 1318.48

## 2019-12-18 NOTE — PROGRESS NOTES
Subjective     Debbie Richardson is a 64 year old female who presents to clinic today for the following health issues:    HPI   Discuss going back on antidepressants   Check Vitamin D     Depression Discussion     How are you doing with your depression since your last visit? Things are good in the summer. Worst in the winter    Are you having other symptoms that might be associated with depression? No    Have you had a significant life event?  Pt's son      Are you feeling anxious or having panic attacks?   Yes:  son has mental health issues     Do you have any concerns with your use of alcohol or other drugs? No    Social History     Tobacco Use     Smoking status: Never Smoker     Smokeless tobacco: Never Used   Substance Use Topics     Alcohol use: Yes     Alcohol/week: 0.0 standard drinks     Comment: Moderate drinker     Drug use: No     PHQ 6/3/2019 10/8/2019 12/18/2019   PHQ-9 Total Score 2 3 9   Q9: Thoughts of better off dead/self-harm past 2 weeks Not at all Not at all Not at all     SANCHEZ-7 SCORE 3/7/2019 6/3/2019 12/18/2019   Total Score - - -   Total Score - - 6 (mild anxiety)   Total Score 0 0 6     Last PHQ-9 12/18/2019   1.  Little interest or pleasure in doing things 2   2.  Feeling down, depressed, or hopeless 2   3.  Trouble falling or staying asleep, or sleeping too much 1   4.  Feeling tired or having little energy 1   5.  Poor appetite or overeating 2   6.  Feeling bad about yourself 1   7.  Trouble concentrating 0   8.  Moving slowly or restless 0   Q9: Thoughts of better off dead/self-harm past 2 weeks 0   PHQ-9 Total Score 9   Difficulty at work, home, or with people -     SANCHEZ-7  12/18/2019   1. Feeling nervous, anxious, or on edge 1   2. Not being able to stop or control worrying 0   3. Worrying too much about different things 1   4. Trouble relaxing 1   5. Being so restless that it is hard to sit still 0   6. Becoming easily annoyed or irritable 2   7. Feeling afraid, as if something  "awful might happen 1   SANCHEZ-7 Total Score 6   If you checked any problems, how difficult have they made it for you to do your work, take care of things at home, or get along with other people? -         Suicide Assessment Five-step Evaluation and Treatment (SAFE-T)      How many servings of fruits and vegetables do you eat daily? 2-3 servings: Blueberry's or juice in the morning.     On average, how many sweetened beverages do you drink each day (Examples: soda, juice, sweet tea, etc.  Do NOT count diet or artificially sweetened beverages)?   Kumbucha, no soda, coffee, 1 glass of wine several times a week     How many days per week do you miss taking your medication? 0    More irritable, constant worry.  Difficulty staying asleep despite higher doses of Trazodone.   She has some days that she finds it difficult to function.   She has been going to [employer] and has a new therapist.  She exercises, does yoga, takes vitamin D.  Adult son moved back home, possibly has addiction issues.   She tapered off of Zoloft in the spring.  Now having more bad days then good days.           Reviewed and updated as needed this visit by Provider               Objective    /72   Pulse 64   Temp 97.6  F (36.4  C) (Oral)   Resp 16   Ht 1.721 m (5' 7.75\")   Wt 71.9 kg (158 lb 8 oz)   LMP 07/13/2008   SpO2 98%   BMI 24.28 kg/m    Body mass index is 24.28 kg/m .  Physical Exam   GENERAL: healthy, alert and no distress  PSYCH: mentation appears normal, affect normal; PHQ-9 score of 9            Assessment & Plan     (F34.1) Persistent depressive disorder  (primary encounter diagnosis)  Comment: worsening  Plan: sertraline (ZOLOFT) 50 MG tablet, sertraline         (ZOLOFT) 100 MG tablet        Will restart Zoloft.    Patient Instructions   Start with 25 mg of Sertraline daily for 3 days, then increase to 50 mg for one week, then increase to 100 mg.  Once you are on 100 mg, you can decrease Trazodone to 150 mg.  Follow up in " one month.                Return in about 4 weeks (around 1/15/2020).    Ai De La Paz NP  [facility]

## 2019-12-18 NOTE — PATIENT INSTRUCTIONS
Start with 25 mg of Sertraline daily for 3 days, then increase to 50 mg for one week, then increase to 100 mg.  Once you are on 100 mg, you can decrease Trazodone to 150 mg.  Follow up in one month.

## 2019-12-19 ASSESSMENT — PATIENT HEALTH QUESTIONNAIRE - PHQ9: SUM OF ALL RESPONSES TO PHQ QUESTIONS 1-9: 9

## 2019-12-19 ASSESSMENT — ANXIETY QUESTIONNAIRES: GAD7 TOTAL SCORE: 6

## 2019-12-20 ENCOUNTER — THERAPY VISIT (OUTPATIENT)
Dept: PHYSICAL THERAPY | Facility: CLINIC | Age: 64
End: 2019-12-20
Payer: COMMERCIAL

## 2019-12-20 DIAGNOSIS — M54.50 ACUTE BILATERAL LOW BACK PAIN WITHOUT SCIATICA: ICD-10-CM

## 2019-12-20 PROCEDURE — 97112 NEUROMUSCULAR REEDUCATION: CPT | Mod: GP | Performed by: PHYSICAL THERAPIST

## 2019-12-20 PROCEDURE — 97110 THERAPEUTIC EXERCISES: CPT | Mod: GP | Performed by: PHYSICAL THERAPIST

## 2019-12-20 PROCEDURE — 97140 MANUAL THERAPY 1/> REGIONS: CPT | Mod: GP | Performed by: PHYSICAL THERAPIST

## 2019-12-27 ENCOUNTER — THERAPY VISIT (OUTPATIENT)
Dept: PHYSICAL THERAPY | Facility: CLINIC | Age: 64
End: 2019-12-27
Payer: COMMERCIAL

## 2019-12-27 DIAGNOSIS — M54.50 ACUTE BILATERAL LOW BACK PAIN WITHOUT SCIATICA: ICD-10-CM

## 2019-12-27 PROCEDURE — 97112 NEUROMUSCULAR REEDUCATION: CPT | Mod: GP | Performed by: PHYSICAL THERAPIST

## 2019-12-27 PROCEDURE — 97140 MANUAL THERAPY 1/> REGIONS: CPT | Mod: GP | Performed by: PHYSICAL THERAPIST

## 2019-12-27 PROCEDURE — 97110 THERAPEUTIC EXERCISES: CPT | Mod: GP | Performed by: PHYSICAL THERAPIST

## 2019-12-27 NOTE — PROGRESS NOTES
"Subjective:  The history is provided by the patient. No  was used.     Oswestry Score: 18 %                 Objective:  System    Physical Exam    General     ROS    Assessment/Plan:    DISCHARGE REPORT    Progress reporting period is from 11/29/19 to 12/27/19.       SUBJECTIVE  Subjective changes noted by patient:  Low back feels \"pretty good.\" More aware of posture with sitting. Able to go for walks.    Current Pain level: 1/10.     Initial Pain level: 10/10.   Changes in function:  Yes (See Goal flowsheet attached for changes in current functional level)  Adverse reaction to treatment or activity: None    OBJECTIVE  Changes noted in objective findings:  Yes, see objective findings.  Objective: Lumbar AROM: flex min loss -, ext WNL -, L SG WNL + L low back, R SG WNL -.     ASSESSMENT/PLAN  Updated problem list and treatment plan: Diagnosis 1:  Low back pain  Pain -  hot/cold therapy, manual therapy, self management, education, directional preference exercise and home program  Decreased ROM/flexibility - manual therapy, therapeutic exercise and home program  Decreased proprioception - neuro re-education, therapeutic activities and home program  Impaired muscle performance - neuro re-education and home program  Decreased function - therapeutic activities and home program  Impaired posture - neuro re-education and home program  STG/LTGs have been met or progress has been made towards goals:  Yes (See Goal flow sheet completed today.)  Assessment of Progress: The patient's condition is improving.  Self Management Plans:  Patient is independent in a home treatment program.  Patient is independent in self management of symptoms.  I have re-evaluated this patient and find that the nature, scope, duration and intensity of the therapy is appropriate for the medical condition of the patient.  Debbie continues to require the following intervention to meet STG and LTG's:  PT intervention is no longer " required to meet STG/LTG.    Recommendations:  This patient is ready to be discharged from therapy and continue their home treatment program.    Please refer to the daily flowsheet for treatment today, total treatment time and time spent performing 1:1 timed codes.

## 2020-01-14 ENCOUNTER — MYC MEDICAL ADVICE (OUTPATIENT)
Dept: FAMILY MEDICINE | Facility: CLINIC | Age: 65
End: 2020-01-14

## 2020-01-15 ENCOUNTER — E-VISIT (OUTPATIENT)
Dept: FAMILY MEDICINE | Facility: CLINIC | Age: 65
End: 2020-01-15
Payer: COMMERCIAL

## 2020-01-15 DIAGNOSIS — N95.2 VAGINAL ATROPHY: ICD-10-CM

## 2020-01-15 DIAGNOSIS — F34.1 PERSISTENT DEPRESSIVE DISORDER: Primary | ICD-10-CM

## 2020-01-15 PROCEDURE — 99421 OL DIG E/M SVC 5-10 MIN: CPT | Performed by: NURSE PRACTITIONER

## 2020-01-15 RX ORDER — SERTRALINE HYDROCHLORIDE 100 MG/1
100 TABLET, FILM COATED ORAL DAILY
Qty: 30 TABLET | Refills: 5 | Status: SHIPPED | OUTPATIENT
Start: 2020-01-15 | End: 2020-06-09

## 2020-01-15 ASSESSMENT — PATIENT HEALTH QUESTIONNAIRE - PHQ9
SUM OF ALL RESPONSES TO PHQ QUESTIONS 1-9: 1
SUM OF ALL RESPONSES TO PHQ QUESTIONS 1-9: 1
10. IF YOU CHECKED OFF ANY PROBLEMS, HOW DIFFICULT HAVE THESE PROBLEMS MADE IT FOR YOU TO DO YOUR WORK, TAKE CARE OF THINGS AT HOME, OR GET ALONG WITH OTHER PEOPLE: NOT DIFFICULT AT ALL

## 2020-01-16 ASSESSMENT — PATIENT HEALTH QUESTIONNAIRE - PHQ9: SUM OF ALL RESPONSES TO PHQ QUESTIONS 1-9: 1

## 2020-02-26 ENCOUNTER — OFFICE VISIT (OUTPATIENT)
Dept: OPTOMETRY | Facility: CLINIC | Age: 65
End: 2020-02-26
Payer: COMMERCIAL

## 2020-02-26 DIAGNOSIS — H52.13 MYOPIA WITH ASTIGMATISM AND PRESBYOPIA, BILATERAL: Primary | ICD-10-CM

## 2020-02-26 DIAGNOSIS — H02.889 MEIBOMIAN GLAND DYSFUNCTION: ICD-10-CM

## 2020-02-26 DIAGNOSIS — H52.203 MYOPIA WITH ASTIGMATISM AND PRESBYOPIA, BILATERAL: Primary | ICD-10-CM

## 2020-02-26 DIAGNOSIS — H52.4 MYOPIA WITH ASTIGMATISM AND PRESBYOPIA, BILATERAL: Primary | ICD-10-CM

## 2020-02-26 PROCEDURE — 92014 COMPRE OPH EXAM EST PT 1/>: CPT | Performed by: OPTOMETRIST

## 2020-02-26 PROCEDURE — 92015 DETERMINE REFRACTIVE STATE: CPT | Performed by: OPTOMETRIST

## 2020-02-26 ASSESSMENT — REFRACTION_WEARINGRX
OD_CYLINDER: +1.75
OD_SPHERE: -2.50
OS_AXIS: 161
SPECS_TYPE: PAL
OS_CYLINDER: +2.00
OS_ADD: +2.75
OD_ADD: +2.75
OS_SPHERE: -3.00
OD_AXIS: 020

## 2020-02-26 ASSESSMENT — TONOMETRY
IOP_METHOD: APPLANATION
OD_IOP_MMHG: 15
OS_IOP_MMHG: 15

## 2020-02-26 ASSESSMENT — REFRACTION_MANIFEST
OD_AXIS: 011
OS_SPHERE: -2.75
OS_AXIS: 161
OS_CYLINDER: +2.00
OD_AXIS: 015
METHOD_AUTOREFRACTION: 1
OD_CYLINDER: +1.75
OD_SPHERE: -2.00
OS_CYLINDER: +1.50
OD_ADD: +2.75
OS_ADD: +2.75
OD_SPHERE: -2.25
OS_SPHERE: -2.00
OS_AXIS: 155
OD_CYLINDER: +1.75

## 2020-02-26 ASSESSMENT — VISUAL ACUITY
CORRECTION_TYPE: GLASSES
OD_CC: 20/30-1
OS_CC: 20/20
METHOD: SNELLEN - LINEAR
OD_CC: 20/20
OD_SC+: -2
OS_SC+: -1
OS_CC: 20/60-1
OS_SC: 20/125
OD_SC: 20/40

## 2020-02-26 ASSESSMENT — SLIT LAMP EXAM - LIDS: COMMENTS: MEIBOMIAN GLAND DYSFUNCTION

## 2020-02-26 ASSESSMENT — EXTERNAL EXAM - RIGHT EYE: OD_EXAM: NORMAL

## 2020-02-26 ASSESSMENT — CUP TO DISC RATIO
OS_RATIO: 0.2
OD_RATIO: 0.1

## 2020-02-26 ASSESSMENT — CONF VISUAL FIELD
OS_NORMAL: 1
OD_NORMAL: 1
METHOD: COUNTING FINGERS

## 2020-02-26 ASSESSMENT — EXTERNAL EXAM - LEFT EYE: OS_EXAM: NORMAL

## 2020-02-26 NOTE — PROGRESS NOTES
Chief Complaint   Patient presents with     Annual Eye Exam      BOB: 2016  Blurry DVA and blurry NVA  Every once in a while noticing a little flash of light off in her periphery.   NO other concerns at this time.      Last Eye Exam: 2017  Dilated Previously: Yes    What are you currently using to see?  glasses       Distance Vision Acuity: Noticed gradual change in both eyes    Near Vision Acuity: Not satisfied     Eye Comfort: good  Do you use eye drops? : No  Occupation or Hobbies: Retired: likes to walk, read, garden, explore    Shani Leon CPO          Medical, surgical and family histories reviewed and updated 2/26/2020.       OBJECTIVE: See Ophthalmology exam    ASSESSMENT:    ICD-10-CM    1. Myopia with astigmatism and presbyopia, bilateral H52.13     H52.203     H52.4    2. Meibomian gland dysfunction H02.889     no subjective improvement  W/ slight change  PLAN:   No prescription change needed  Warm compresses/artificial tears as needed   S/s flashes floaters and when to return to clinic     Lara Navarrete OD

## 2020-02-26 NOTE — LETTER
2/26/2020         RE: Debbie Richardson  [address] [place] [address]        Dear Colleague,    Thank you for [facility], [facility] Richardson[facility]. Please see a copy of my visit note below.    Chief Complaint   Patient presents with     Annual Eye Exam      BOB: 2016  Blurry DVA and blurry NVA  Every once in a while noticing a little flash of light off in her periphery.   NO other concerns at this time.      Last Eye Exam: 2017  Dilated Previously: Yes    What are you currently using to see?  glasses       Distance Vision Acuity: Noticed gradual change in both eyes    Near Vision Acuity: Not satisfied     Eye Comfort: good  Do you use eye drops? : No  Occupation or Hobbies: Retired: likes to walk, read, garden, explore    Shani Leon CPO          Medical, surgical and family histories reviewed and updated 2/26/2020.       OBJECTIVE: See Ophthalmology exam    ASSESSMENT:    ICD-10-CM    1. Myopia with astigmatism and presbyopia, bilateral H52.13     H52.203     H52.4    2. Meibomian gland dysfunction H02.889     no subjective improvement  W/ slight change  PLAN:   No prescription change needed  Warm compresses/artificial tears as needed   S/s flashes floaters and when to return to clinic     Lara Navarrete OD     Again, thank you for allowing me to participate in the care of your patient.        Sincerely,        Lara Navarrete, OD

## 2020-02-26 NOTE — PATIENT INSTRUCTIONS
Meibomian gland dysfunction or Posterior Blepharitis, is characterized by inflammation along both the uppper and lower eyelid margins. A single row of these glands is present in each lid with openings along the lid margins.  It is often found in association with skin conditions such as rosacea and seborrheic dermatitis.    Symptoms include:  ?Red eyes  ?Gritty or burning sensation  ?Excessive tearing  ?Itchy eyelids  ?Red, swollen eyelids  ?Crusting or matting of eyelashes in the morning  ?Light sensitivity  ?Blurred vision    It is important to keep cosmetics from blocking these oil glands. If blocked, they do not   excrete oil into the tear film, which causes the tears to evaporate quickly.   This may result in watery eyes.  There is also an increase of bacterial growth when the tear film is unstable, leading to further ocular surface inflammation.    Warm compresses are very beneficial to the normal functioning of the eye.  Warm compresses for 5-10 minutes twice daily and keeping the lid margins clean  and help maintain a good tear film.   Moisten a washcloth with hot water, or microwave for 10 seconds, being careful to not get the cloth too hot.   Then put the washcloth onto your eyelids for 5 minutes. It will cool quickly so a rice pack or eyemask that can be heated and laid on top of the washcloth will help retain the heat.    Omega 3 fatty acid supplements taken once to twice daily and artificial tears such as Soothe xp, Refresh optive , Retaine and systane balance are also an additional treatment to control inflammation and help soothe your eyes.       Patient Education     What Are Flashes and Floaters?  Have you ever seen flashes of light, stars, or streaks that aren t really there? A few of these flashes are seen by everyone from time to time. Usually you see them in one eye at a time. Flashes are often caused by the gel filling inside of your eye, called the vitreous, pulling on the retina. The retina is  a membrane that lines the inside of your eye.  Floaters look like dark specks, clouds, threads, or spider webs moving through your eyesight. Most people see them once in a while. Floaters may be pieces of gel or other material floating inside your eye. They are usually harmless.      Who gets flashes?  As you age or if you are nearsighted, you are more likely to see flashes. Nearsightedness is when you have fuzzy distance vision. Sometimes, flashes are a sign of other eye problems that need care.  Who gets floaters?  The older you get, the more likely you ll notice floaters. Floaters can also be caused by an eye injury or surgery. People who are very nearsighted may get more floaters. If floaters appear suddenly or greatly increase in number, see your healthcare provider. This may be a sign of an eye problem.  Date Last Reviewed: 10/1/2017    1808-7124 [employer]. [address] [place] [address], [place] [address]. All rights reserved. This information is not intended as a substitute for professional medical care. Always follow your healthcare professional's instructions.

## 2020-03-12 ENCOUNTER — MYC MEDICAL ADVICE (OUTPATIENT)
Dept: FAMILY MEDICINE | Facility: CLINIC | Age: 65
End: 2020-03-12

## 2020-03-12 DIAGNOSIS — F34.1 PERSISTENT DEPRESSIVE DISORDER: ICD-10-CM

## 2020-03-12 DIAGNOSIS — N95.2 VAGINAL ATROPHY: ICD-10-CM

## 2020-03-12 DIAGNOSIS — G47.00 INSOMNIA, UNSPECIFIED TYPE: ICD-10-CM

## 2020-03-13 ENCOUNTER — VIRTUAL VISIT (OUTPATIENT)
Dept: FAMILY MEDICINE | Facility: OTHER | Age: 65
End: 2020-03-13

## 2020-03-18 NOTE — PROGRESS NOTES
"Date: 2020 11:30:10  Clinician: Danielle Porter  Clinician NPI: [id number]  Patient: Debbie Zuñiga  Patient : 1955  Patient Address: [address] [place] [address]  Patient Phone: [phone]  Visit Protocol: URI  Patient Summary:  Debbie is a 64 year old ( : 1955 ) female who initiated a Visit for COVID-19 (Coronavirus) evaluation and screening. When asked the question \"Please sign me up to receive news, health information and promotions from [employer].\", Debbie responded \"Yes\".    Debbie states her symptoms started 1-2 days ago.   Her symptoms consist of a cough, malaise, rhinitis, and myalgia.   Symptom details     Nasal secretions: The color of her mucus is clear.    Cough: Debbie coughs a few times an hour and her cough is not more bothersome at night. Phlegm does not come into her throat when she coughs. She does not believe her cough is caused by post-nasal drip.      Debbie denies having chills, wheezing, sore throat, nasal congestion, fever, teeth pain, ear pain, headache, and facial pain or pressure. She also denies taking antibiotic medication for the symptoms and having recent facial or sinus surgery in the past 60 days. She is not experiencing dyspnea.   Precipitating events  She has not recently been exposed to someone with influenza. Debbie has been in close contact with the following high risk individuals: adults 65 or older and people with asthma, heart disease or diabetes.   Pertinent COVID-19 (Coronavirus) information  Debbie has not traveled internationally or to the areas where COVID-19 (Coronavirus) is widespread in the last 14 days before the start of her symptoms.   Debbie has not had close contact with a laboratory-confirmed positive COVID-19 patient or someone under quarantine for suspected COVID-19 within 14 days of symptom onset.   Debbie is not a healthcare worker or does not work in a healthcare facility.   Pertinent medical history  Debbie " does not get yeast infections when she takes antibiotics.   Debbie does not need a return to work/school note.   Weight: 157 lbs   Debbie does not smoke or use smokeless tobacco.   Additional information as reported by the patient (free text): a mild tightness in the chest   Weight: 157 lbs    MEDICATIONS: trazodone oral, Premarin vaginal, Zoloft oral, ALLERGIES: NKDA  Clinician Response:  Dear Debbie,  Based on the information provided, you have a viral upper respiratory infection, otherwise known as a cold. Symptoms vary from person to person, but can include sneezing, coughing, a runny nose, sore throat, and headache and range from mild to severe.  Unfortunately, there are no medications that can cure a cold, so treatment is focused on controlling symptoms as much as possible. Most people gradually feel better until symptoms are gone in 1-2 weeks.  Medication information  Because you have a viral infection, antibiotics will not help you get better. Treating a viral infection with antibiotics could actually make you feel worse.  I am prescribing:     Benzonatate (Tessalon Perles) 100 mg oral capsule. Take 1-2 capsules by mouth 3 times per day as needed for your cough. There are no refills with this prescription.   Unless you are allergic to the over-the-counter medication(s) below, I recommend using:       Acetaminophen (Tylenol or store brand) oral tablet. Take 1-2 tablets by mouth every 4-6 hours to help with the discomfort.      Ibuprofen (Advil or store brand) 200 mg oral tablet. Take 1-3 tablets (200-600 mg) by mouth every 8 hours to help with the discomfort. Make sure to take the ibuprofen with food. Do not exceed 2400 mg in 24 hours.     Over-the-counter medications do not require a prescription. Ask the pharmacist if you have any questions.  Self care  The following tips will keep you as comfortable as possible while you recover:     Rest    Drink plenty of water and other liquids    Take a hot shower  to loosen congestion    Take a spoonful of honey to reduce your cough     When to seek care  Please be seen in a clinic or urgent care if new symptoms develop, or symptoms become worse.  Additional treatment plan     Dear Debbie,  Based on the information you have provided, it does not appear you need Coronavirus (COVID-19) testing.   At this time, we recommend testing primarily for those people who have symptoms of cough and fever and have either traveled to a known area of infection or have been exposed to someone with laboratory confirmed Coronavirus by close contact.   Coronavirus - General Information:   The coronavirus infection starts within 14 days of an exposure.  Symptoms are those of a respiratory infection (such as fever, cough).   If you have not had symptoms by day 15, you should be considered uninfected by coronavirus.   Coronavirus - Symptoms:    The coronavirus can cause a respiratory illness, such as bronchitis or pneumonia.  The most common symptoms are: cough, fever, and shortness of breath.   Other symptoms are: body aches, chills, diarrhea, fatigue, headache, runny nose, and sore throat   Coronavirus - Exposure Risk Factors:   Exposure to a person who has been diagnosed with coronavirus.  Travel from an area with recent local transmission of coronavirus.  The CDC (www.cdc.gov) has the most up-to-date list of where the coronavirus outbreak is occurring.   Coronavirus - Spreading:    The virus likely spreads through respiratory droplets produced when a person coughs or sneezes. These respiratory droplets can travel approximately 6 feet and can remain on surfaces. Common disinfectants will kill the virus.  The CDC currently does not recommend healthy people wear masks.   Coronavirus - Protect Yourself:    Avoid close contact with people known to have this new coronavirus infection.  Wash hands often with soap and water or alcohol-based hand .  Avoid touching the eyes, nose or mouth.    Thank you for limiting contact with others, wearing a simple mask to cover your cough, practice good hand hygiene habits and accessing our virtual services where possible to limit the spread of this virus.  For more information about COVID19 and options for caring for yourself at home, please visit the CDC website at https://www.cdc.gov/coronavirus/2019-ncov/about/steps-when-sick.html   For more options for care at [facility], please visit our website at [email or web address]     COVID-19 (Coronavirus) General Information  With the increase in the number of COVID-19 (Coronavirus) cases, we understand you may have some questions. Below is some helpful information on COVID-19 (Coronavirus).  How can I protect myself and others from the COVID-19 (Coronavirus)?  Because there is currently no vaccine to prevent infection, the best way to protect yourself is to avoid being exposed to this virus. Put distance between yourself and other people if COVID-19 (Coronavirus) is spreading in your community. The virus is thought to spread mainly from person-to-person.     Between people who are in close contact with one another (within about 6 about) for prolonged period (10 minutes or longer).    Through respiratory droplets produced when an infected person coughs or sneezes.     The CDC recommends the following additional steps to protect yourself and others:     Wash your hands often with soap and water for at least 20 seconds, especially after blowing your nose, coughing, or sneezing; going to the bathroom; and before eating or preparing food.  Use an alcohol-based hand  that contains at least 60 percent alcohol if soap and water are not available.        Avoid touching your eyes, nose and mouth with unwashed hands.    Avoid close contact with people who are sick.    Stay home when you are sick.    Cover your cough or sneeze with a tissue, then throw the tissue in the trash.    Clean  and disinfect frequently touched objects and surfaces.     You can help stop COVID-19 (Coronavirus) by knowing the signs and symptoms:     Fever    Cough    Shortness of breath     Contact your healthcare provider if   Develop symptoms   AND   Have been in close contact with a person known to have COVID-19 (Coronavirus) or live in or have recently traveled from an area with ongoing spread of COVID-19 (Coronavirus). Call ahead before you go to a doctor's office or emergency room. Tell them about your recent travel and your symptoms.   For the most up to date information, visit the CDC's website.  Steps to help prevent the spread of COVID-19 (Coronavirus) if you are sick  If you are sick with COVID-19 (Coronavirus) or suspect you are infected with the virus that causes COVID-19 (Coronavirus), follow the steps below to help prevent the disease from spreading&nbsp;to people in your home and community.     Stay home except to get medical care. Home isolation may be started in consultation with your healthcare clinician.    Separate yourself from other people and animals in your home.    Call ahead before visiting your doctor if you have a medical appointment.    Wear a facemask when you are around other people.    Cover your cough and sneezes.    Clean your hands often.    Avoid sharing personal household items.    Clean and disinfect frequently touched objects and surfaces everyday.    You will need to have someone drop off medications or household supplies (if needed) at your house without coming inside or in contact with you or others living in your house.    Monitor your symptoms and seek prompt medical care if your illness is worsening (e.g. Difficulty breathing).    Discontinue home isolation only in consultation with your healthcare provider.     For more detailed and up to date information on what to do if you are sick, visit this link: What to Do If You Are Sick With Coronavirus Disease 2019 (COVID-19).  Do I  "need to be tested for COVID-19 (Coronavirus)?     At this time, the limited number of tests available are controlled by the state and local health departments and are being reserved for more seriously ill patients, those with known exposure to confirmed patients, and those with recent travel (within 14 days) to countries with high rates of COVID-19 (Coronavirus).    Decisions on which patients receive testing will be based on the local spread of COVID-19 (Coronavirus) as well as the symptoms. Your healthcare provider will make the final decision on whether you should be tested.    In the meantime, if you have concerns that you may have been exposed, it is reasonable to practice \"social distancing.\"&nbsp; If you are ill with a cold or flu-like illness, please monitor your symptoms and reach out to your healthcare provider if your symptoms worsen.    For more up to date information, visit this link: COVID-19 (Coronavirus) Frequently Asked Questions and Answers.      Diagnosis: Cough  Diagnosis ICD: R05  Additional Clinician Notes: I hope you're feeling better soon!  Prescription: benzonatate (Tessalon Perles) 100 mg oral capsule 30 capsule, 5 days supply. Take 1-2 capsules by mouth 3 times per day as needed. Refills: 0, Refill as needed: no, Allow substitutions: yes  Pharmacy: [facility] - [phone] - [address], [place] [address]  "

## 2020-06-03 ENCOUNTER — MYC MEDICAL ADVICE (OUTPATIENT)
Dept: FAMILY MEDICINE | Facility: CLINIC | Age: 65
End: 2020-06-03

## 2020-06-04 ENCOUNTER — MYC MEDICAL ADVICE (OUTPATIENT)
Dept: FAMILY MEDICINE | Facility: CLINIC | Age: 65
End: 2020-06-04

## 2020-06-04 NOTE — TELEPHONE ENCOUNTER
Will respond to her on the other [employer] message of 6/4/2020  This encounter will be closed    Lara Coker RN

## 2020-06-08 DIAGNOSIS — F34.1 PERSISTENT DEPRESSIVE DISORDER: ICD-10-CM

## 2020-06-08 DIAGNOSIS — Z20.828 CONTACT WITH OR EXPOSURE TO VIRAL DISEASE: ICD-10-CM

## 2020-06-08 PROCEDURE — 86769 SARS-COV-2 COVID-19 ANTIBODY: CPT | Mod: 90 | Performed by: NURSE PRACTITIONER

## 2020-06-08 PROCEDURE — 36415 COLL VENOUS BLD VENIPUNCTURE: CPT | Performed by: NURSE PRACTITIONER

## 2020-06-08 PROCEDURE — 99000 SPECIMEN HANDLING OFFICE-LAB: CPT | Performed by: NURSE PRACTITIONER

## 2020-06-08 NOTE — LETTER
[place] 10, 2020        eDbbie Richardson  [address] [place] [address]      COVID-19 Antibody Screen   Date Value Ref Range Status   06/08/2020 Negative  Final     Comment:     No COVID-19 antibodies detected.  Patients within 10 days of symptom onset for   COVID-19 may not produce sufficient levels of detectable antibodies.    Immunocompromised COVID-19 patients may take longer to develop antibodies.         You have tested NEGATIVE for COVID-19 antibodies. This suggests you have not had or been exposed to COVID-19. But it does not mean that for sure.     The test finds antibodies in most people 10 days after they get sick. For some people, it takes longer than 10 days for antibodies to show up. Others may never show antibodies against COVID-19, especially if they have weak immune systems.    If you have COVID-19 symptoms now, please stay home and away from others.     What is antibody testing?    This is a kind of blood test. We take a small sample of your blood, and then test it for something called  antibodies.      Your body makes antibodies to fight infection. If your blood has antibodies for a certain germ, it means you ve been infected with that germ in the past.     Sometimes, antibodies stay in your body for years after you ve had the infection. They can be there even if the germ didn t make you sick. They are a sign that your body fought off the infection.    Will this test find antibodies in everyone who s had COVID-19?    No. The test finds antibodies in most people 10 days after they get sick. For some people, it takes longer than 10 days for antibodies to show up. Others may never show antibodies against COVID-19, especially if they have weak immune systems.    What does it mean if the test finds COVID-19 antibodies?    If we find these antibodies, it suggests:     This person has had the virus.     Their body s immune system fought the virus.     We don t know if this will  help protect someone from getting COVID-19 again. Scientists are still learning about this.    What are the signs of COVID-19?    Signs of COVID-19 can appear from 2 to 14 days (up to 2 weeks) after you re infected. Some people have no symptoms or only mild symptoms. Others get very sick. The most common symptoms are:          Cough    Shortness of breath or trouble breathing  Or at least 2 of these symptoms:    Fever    Chills    Repeated shaking with chills    Muscle pain    Headache    Sore throat    Losing your sense of taste or smell    You may have other symptoms. Please contact your doctor or clinic for any symptoms that worry you.    Where can I get more information?     To learn the [facility] guidelines for staying home, please visit the [place] Department of Health website at [email or web address].[email or web address].mn.us/diseases/coronavirus/basics.html    To learn more about COVID-19 and how to care for yourself at home, please visit the CDC website at https://www.cdc.gov/coronavirus/2019-ncov/about/steps-when-sick.html    For more options for care at [facility], please visit our website at [email or web address]    [facility] ([facility]) COVID-19 Hotline:  [phone]

## 2020-06-09 LAB
COVID-19 SPIKE RBD ABY TITER: NORMAL
COVID-19 SPIKE RBD ABY: NEGATIVE

## 2020-06-09 RX ORDER — SERTRALINE HYDROCHLORIDE 100 MG/1
TABLET, FILM COATED ORAL
Qty: 30 TABLET | Refills: 0 | Status: SHIPPED | OUTPATIENT
Start: 2020-06-09 | End: 2020-06-22

## 2020-07-07 ENCOUNTER — E-VISIT (OUTPATIENT)
Dept: FAMILY MEDICINE | Facility: CLINIC | Age: 65
End: 2020-07-07
Payer: COMMERCIAL

## 2020-07-07 DIAGNOSIS — F34.1 PERSISTENT DEPRESSIVE DISORDER: ICD-10-CM

## 2020-07-07 PROCEDURE — 99207 ZZC NO BILLABLE SERVICE THIS VISIT: CPT | Performed by: NURSE PRACTITIONER

## 2020-07-07 RX ORDER — SERTRALINE HYDROCHLORIDE 100 MG/1
100 TABLET, FILM COATED ORAL DAILY
Qty: 90 TABLET | Status: SHIPPED | OUTPATIENT
Start: 2020-07-07 | End: 2021-01-07

## 2020-07-07 ASSESSMENT — PATIENT HEALTH QUESTIONNAIRE - PHQ9
10. IF YOU CHECKED OFF ANY PROBLEMS, HOW DIFFICULT HAVE THESE PROBLEMS MADE IT FOR YOU TO DO YOUR WORK, TAKE CARE OF THINGS AT HOME, OR GET ALONG WITH OTHER PEOPLE: NOT DIFFICULT AT ALL
SUM OF ALL RESPONSES TO PHQ QUESTIONS 1-9: 1
SUM OF ALL RESPONSES TO PHQ QUESTIONS 1-9: 1

## 2020-07-08 ASSESSMENT — PATIENT HEALTH QUESTIONNAIRE - PHQ9: SUM OF ALL RESPONSES TO PHQ QUESTIONS 1-9: 1

## 2020-09-07 ENCOUNTER — ANCILLARY PROCEDURE (OUTPATIENT)
Dept: GENERAL RADIOLOGY | Facility: CLINIC | Age: 65
End: 2020-09-07
Attending: PHYSICIAN ASSISTANT
Payer: COMMERCIAL

## 2020-09-07 ENCOUNTER — OFFICE VISIT (OUTPATIENT)
Dept: URGENT CARE | Facility: URGENT CARE | Age: 65
End: 2020-09-07
Payer: COMMERCIAL

## 2020-09-07 VITALS
HEART RATE: 68 BPM | OXYGEN SATURATION: 98 % | TEMPERATURE: 98.5 F | SYSTOLIC BLOOD PRESSURE: 120 MMHG | DIASTOLIC BLOOD PRESSURE: 64 MMHG

## 2020-09-07 DIAGNOSIS — S89.92XA LEG INJURY, LEFT, INITIAL ENCOUNTER: Primary | ICD-10-CM

## 2020-09-07 DIAGNOSIS — S89.92XA LEG INJURY, LEFT, INITIAL ENCOUNTER: ICD-10-CM

## 2020-09-07 DIAGNOSIS — R07.81 RIB PAIN ON RIGHT SIDE: ICD-10-CM

## 2020-09-07 PROCEDURE — 73562 X-RAY EXAM OF KNEE 3: CPT | Mod: LT

## 2020-09-07 PROCEDURE — 99214 OFFICE O/P EST MOD 30 MIN: CPT | Performed by: PHYSICIAN ASSISTANT
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
## 2020-09-08 ENCOUNTER — VIRTUAL VISIT (OUTPATIENT)
Dept: FAMILY MEDICINE | Facility: OTHER | Age: 65
End: 2020-09-08
Payer: COMMERCIAL

## 2020-09-08 PROCEDURE — 99421 OL DIG E/M SVC 5-10 MIN: CPT | Performed by: PHYSICIAN ASSISTANT

## 2020-09-08 ASSESSMENT — ENCOUNTER SYMPTOMS
NUMBNESS: 0
WEAKNESS: 0

## 2020-09-08 NOTE — PROGRESS NOTES
"Date: 2020 14:48:47  Clinician: Oscar Reynaga  Clinician NPI: [id number]  Patient: Debbie Zuñiga  Patient : 1955  Patient Address: [address] [place] [address] [place] [address]  Patient Phone: [phone]  Visit Protocol: URI  Patient Summary:  Debbie is a 64 year old ( : 1955 ) female who initiated a Visit for COVID-19 (Coronavirus) evaluation and screening. When asked the question \"Please sign me up to receive news, health information and promotions from [employer].\", Dbebie responded \"No\".    When asked when her symptoms started, Debbie reported that she does not have any symptoms.   She denies taking antibiotic medication in the past month and having recent facial or sinus surgery in the past 60 days.    Pertinent COVID-19 (Coronavirus) information  In the past 14 days, Debbie has not worked in a congregate living setting.   She does not work or volunteer as healthcare worker or a  and does not work or volunteer in a healthcare facility.   Debbie also has not lived in a congregate living setting in the past 14 days. She does not live with a healthcare worker.   Debbie has not had a close contact with a laboratory-confirmed COVID-19 patient within the last 14 days.   Since 2019, Debbie and has not had upper respiratory infection or influenza-like illness. Has not been diagnosed with lab-confirmed COVID-19 test   Pertinent medical history  Debbie does not get yeast infections when she takes antibiotics.   Debbie does not need a return to work/school note.   Weight: 158 lbs   Debbie does not smoke or use smokeless tobacco.   Additional information as reported by the patient (free text): I was at a wedding in [place] Saturday night with approximately 250 people. Very few people in masks or keeping social distance. I'm wondering if I should be tested, even tho I have no symptoms.   Weight: 158 lbs    MEDICATIONS: Premarin vaginal, Zoloft oral, trazodone oral, " ALLERGIES: NKDA  Clinician Response:  Dear Debbie,   Based on the details you've shared, you are concerned about contact with someone who may have been exposed to coronavirus (COVID-19). Based on [facility] guidelines you do not need to be tested at this time.  Testing for people who do not have symptoms is only required in certain instances, including direct contact with a person who has tested positive for COVID-19, or for those who are traveling to locations where testing is required beforehand.  Please redo an [facility] visit or call your clinic if you think we missed needed information, your exposure information has changed, or you develop symptoms.  What are the symptoms of COVID-19?  The most common symptoms are cough, fever and trouble breathing. Less common symptoms include headache, body aches, fatigue (feeling very tired), chills, sore throat, stuffy or runny nose, diarrhea (loose poop), loss of taste or smell, belly pain, and nausea or vomiting (feeling sick to your stomach or throwing up).  Where can I get more information?  [facility] -- About COVID-19: [email or web address]  CDC -- What to Do If You're Sick: www.cdc.gov/coronavirus/2019-ncov/about/steps-when-sick.html  CDC -- Ending Home Isolation: www.cdc.gov/coronavirus/2019-ncov/hcp/disposition-in-home-patients.html  CDC -- Caring for Someone: www.cdc.gov/coronavirus/2019-ncov/if-you-are-sick/care-for-someone.html  [facility] -- Interim Guidance for Hospital Discharge to Home: www.health.[facility].[place].us/diseases/coronavirus/hcp/hospdischarge.pdf  [facility] clinical trials (COVID-19 research studies): clinicalaffairs.[facility]/n-clinical-trials  Below are the COVID-19 hotlines at the [place] Department of Health ([facility]). Interpreters are available.  For health questions: Call 824-193-5561 or 1-368.514.9490 (7 a.m. to 7 p.m.)  For questions about schools and childcare: Call 588-135-1549 or 1-724.909.2915 (7 a.m. to 7 p.m.)     Diagnosis: Worries  Diagnosis ICD: R45.82

## 2020-09-08 NOTE — PATIENT INSTRUCTIONS
Rest an injury, elevate it, and use ice and compression as directed.   RICE stands for rest, ice, compression, and elevation. These can limit pain and swelling after an injury. RICE may be recommended to help treat breaks (fractures), sprains, strains, and bruises or bumps.   Home care  Here are the details of RICE:    Rest. Limit the use of the injured body part. This helps prevent further damage to the body part and gives it time to heal. In some cases, you may need a sling, brace, splint, or cast to help keep the body part still until it has healed.    Ice. Applying ice right after an injury helps relieve pain and swelling. To make an ice pack, put ice cubes in a plastic bag that seals at the top. Wrap the bag in a clean, thin towel or cloth. Then place it over the injured area. Do this for 10 to 15 minutes every 3 to 4 hours. Continue for the next 1 to 3 days or until your symptoms improve. Never put ice directly on your skin. Don't ice an area longer than 15 minutes at a time.    Compression. Putting pressure on an injury helps reduce swelling and provides support. Wrap the injured area firmly with an elastic bandage or wrap. Make sure not to wrap the bandage too tightly or you will cut off blood flow to the injured area. If your bandage loosens, rewrap it.    Elevation. Keeping an injury raised or elevated above the level of your heart reduces swelling, pain, and throbbing. For instance, if you have a broken leg, it may help to rest your leg on several pillows when sitting or lying down. Try to keep the injured area elevated as often as possible.  Follow-up care  Follow up with your healthcare provider, or as advised.  When to seek medical advice  Call your healthcare provider right away if any of these occur:    Fever of 100.4 F (38 C) or higher, or as directed by your healthcare provider    Chills    Increased pain or swelling in the injured body part    Injured body part becomes cold, blue, numb, or  tingly    Signs of infection. These include warmth in the skin, redness, drainage, or bad smell coming from the injured body part.  Date Last Reviewed: 6/1/2018 2000-2019 [employer]. [address] [place] [address], [place] [address]. All rights reserved. This information is not intended as a substitute for professional medical care. Always follow your healthcare professional's instructions.

## 2020-09-16 ENCOUNTER — VIRTUAL VISIT (OUTPATIENT)
Dept: FAMILY MEDICINE | Facility: OTHER | Age: 65
End: 2020-09-16
Payer: COMMERCIAL

## 2020-09-16 PROCEDURE — 99421 OL DIG E/M SVC 5-10 MIN: CPT | Performed by: EMERGENCY MEDICINE

## 2020-09-17 DIAGNOSIS — Z20.822 SUSPECTED 2019 NOVEL CORONAVIRUS INFECTION: Primary | ICD-10-CM

## 2020-09-17 DIAGNOSIS — Z20.822 SUSPECTED 2019 NOVEL CORONAVIRUS INFECTION: ICD-10-CM

## 2020-09-17 PROCEDURE — U0003 INFECTIOUS AGENT DETECTION BY NUCLEIC ACID (DNA OR RNA); SEVERE ACUTE RESPIRATORY SYNDROME CORONAVIRUS 2 (SARS-COV-2) (CORONAVIRUS DISEASE [COVID-19]), AMPLIFIED PROBE TECHNIQUE, MAKING USE OF HIGH THROUGHPUT TECHNOLOGIES AS DESCRIBED BY CMS-2020-01-R: HCPCS | Performed by: FAMILY MEDICINE
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
## 2020-09-18 LAB
SARS-COV-2 RNA SPEC QL NAA+PROBE: NOT DETECTED
SPECIMEN SOURCE: NORMAL

## 2020-12-28 ENCOUNTER — VIRTUAL VISIT (OUTPATIENT)
Dept: FAMILY MEDICINE | Facility: CLINIC | Age: 65
End: 2020-12-28
Payer: COMMERCIAL

## 2020-12-28 DIAGNOSIS — R21 RASH AND OTHER NONSPECIFIC SKIN ERUPTION: Primary | ICD-10-CM

## 2020-12-28 PROBLEM — F34.1 PERSISTENT DEPRESSIVE DISORDER: Status: RESOLVED | Noted: 2019-12-18 | Resolved: 2020-12-28

## 2020-12-28 PROCEDURE — 99213 OFFICE O/P EST LOW 20 MIN: CPT | Mod: 95 | Performed by: FAMILY MEDICINE

## 2020-12-28 RX ORDER — TRIAMCINOLONE ACETONIDE 1 MG/G
OINTMENT TOPICAL 2 TIMES DAILY
Qty: 15 G | Refills: 0 | Status: SHIPPED | OUTPATIENT
Start: 2020-12-28 | End: 2021-01-04

## 2020-12-28 ASSESSMENT — PATIENT HEALTH QUESTIONNAIRE - PHQ9: SUM OF ALL RESPONSES TO PHQ QUESTIONS 1-9: 1

## 2020-12-28 NOTE — PROGRESS NOTES
"Debbie Richardson is a 65 year old female who is being evaluated via a billable video visit.      The patient has been notified of following:     \"This video visit will be conducted via a call between you and your physician/provider. We have found that certain health care needs can be provided without the need for an in-person physical exam.  This service lets us provide the care you need with a video conversation.  If a prescription is necessary we can send it directly to your pharmacy.  If lab work is needed we can place an order for that and you can then stop by our lab to have the test done at a later time.    Video visits are billed at different rates depending on your insurance coverage.  Please reach out to your insurance provider with any questions.    If during the course of the call the physician/provider feels a video visit is not appropriate, you will not be charged for this service.\"    Patient has given verbal consent for Video visit? Yes  How would you like to obtain your AVS? MyChart  If you are dropped from the video visit, the video invite should be resent to: Text to cell phone: [phone]  Will anyone else be joining your video visit? No    Subjective     Debbie Richardson is a 65 year old female who presents today via video visit for the following health issues:    HPI        Rash    Duration: 2 weeks    Description  Location:  Right lower leg - shin area  Itching: moderate    Intensity:  moderate    Accompanying signs and symptoms: bumps, spreading, patches     History (similar episodes/previous evaluation): None    Precipitating or alleviating factors: might be irration wool socks   New exposures:  None   Recent travel: no      Therapies tried and outcome: hydrocortisone cream -  not effective     Has a rash on leg , never seen before itchy, and seems to be spreading  Treating with itch relief cerave lotion and hydrocortisone cream 1 %   Trying not to itch it lately   Driving " "her crazy  Skin broke a bit in some areas form itching  Skin seem thicker than usual.   Only new thing is wool socks  Little bumps on sides like little blisters raised  When scratch they open and bleed  But the skin left behind and scratched like thick  No pets at home    Hx of MDD on sertraline, insomnia on trazodone, IBS with constipation, GERD, rectocele, cystocele, prior hysterectomy for precancerous lesions, no longer though requiring pap, vaginal atrophy on premarin 3/ week , urinary urgency, incontinence, genital symptoms, prior rib fracture, right radial fracture and repair, hx of thoracic back pain, S/p EGD, colonoscopy, prior oral surgery, allergic to nickel, metal, food, under care of PCP Josie pardo   Seen by /18/19 when started on sertraline and continued on trazodone. Seen by eye 2/2020 for eye exam, myopia, and meibomian gland dysfunction, covid test negative 6/2020, seen in UC on 9/7/20 for left leg injury and rib contusion. No fracture noted.   Did [facility] 9/8 and 9/16 and had covid test negative on 9/17.    Has apt with PCP on 1/7/2020    PEDS Chronic and Acute Problems (Optional):[id number]}     Video Start Time: 1025    Review of Systems   Constitutional, HEENT, cardiovascular, pulmonary, GI, , musculoskeletal, neuro, skin, endocrine and psych systems are negative, except as otherwise noted.      Objective    Vitals - Patient Reported  Weight (Patient Reported): 69.4 kg (153 lb)  Height (Patient Reported): 174 cm (5' 8.5\")  BMI (Based on Pt Reported Ht/Wt): 22.92        Physical Exam     GENERAL: Healthy, alert and no distress  EYES: Eyes grossly normal to inspection.  No discharge or erythema, or obvious scleral/conjunctival abnormalities.  RESP: No audible wheeze, cough, or visible cyanosis.  No visible retractions or increased work of breathing.    SKIN: difficult to see skin, faint red raised bumps surround lichenified skin colored plaque right upper shin  NEURO: Cranial nerves grossly " intact.  Mentation and speech appropriate for age.  PSYCH: Mentation appears normal, affect normal/bright, judgement and insight intact, normal speech and appearance well-groomed.      No results found for any visits on 12/28/20.        Assessment & Plan     Debbie was seen today for rash.    Diagnoses and all orders for this visit:    Rash and other nonspecific skin eruption  -     DERMATOLOGY ADULT REFERRAL; Future  -     triamcinolone (KENALOG) 0.1 % external ointment; Apply topically 2 times daily for 7 days            Difficult to see on video  Dermatitis versus tinea  Apply rice grain amount spread thin  triamcinolone ointment daily to affected right shin rash for 7 days  Avoid wool socks  Good moisturization after short warm not long hot showers  If not better make apt with dermatology  continue routine care with PCP Josie pardo  On 1/7/20    See Patient Instructions    Return in about 2 weeks (around 1/11/2021), or if symptoms worsen or fail to improve.    Kristie Camp MD  [facility]      Video-Visit Details    Type of service:  Video Visit    Video End Time:1040    Originating Location (pt. Location): Home    Distant Location (provider location):  [facility]     Platform used for Video Visit: [employer]

## 2020-12-28 NOTE — PATIENT INSTRUCTIONS
Apply rice grain amount spread thin  triamcinolone ointment daily to affected right shin rash for 7 days  If not better make apt with dermatology  continue routine care with PCP Josie pardo  On 1/7/20

## 2021-01-07 ENCOUNTER — VIRTUAL VISIT (OUTPATIENT)
Dept: FAMILY MEDICINE | Facility: CLINIC | Age: 66
End: 2021-01-07
Payer: COMMERCIAL

## 2021-01-07 DIAGNOSIS — G47.00 INSOMNIA, UNSPECIFIED TYPE: ICD-10-CM

## 2021-01-07 DIAGNOSIS — F34.1 PERSISTENT DEPRESSIVE DISORDER: ICD-10-CM

## 2021-01-07 PROCEDURE — 99213 OFFICE O/P EST LOW 20 MIN: CPT | Mod: 95 | Performed by: NURSE PRACTITIONER

## 2021-01-07 RX ORDER — SERTRALINE HYDROCHLORIDE 100 MG/1
100 TABLET, FILM COATED ORAL DAILY
Qty: 90 TABLET | Status: SHIPPED | OUTPATIENT
Start: 2021-01-07 | End: 2022-04-19

## 2021-01-07 RX ORDER — TRAZODONE HYDROCHLORIDE 100 MG/1
200 TABLET ORAL AT BEDTIME
Qty: 180 TABLET | Refills: 3 | Status: SHIPPED | OUTPATIENT
Start: 2021-01-07 | End: 2022-05-02

## 2021-01-07 NOTE — PROGRESS NOTES
Debbie Richardson is a 65 year old female who is being evaluated via a billable video visit.      How would you like to obtain your AVS? MyChart  If the video visit is dropped, the invitation should be resent by: Send to e-mail at: [email or web address]  Will anyone else be joining your video visit? No      Video Start Time: 11:32 AM  Assessment & Plan     Insomnia, unspecified type  Well-controlled  The current medical regimen is effective;  continue present plan and medications.   - traZODone (DESYREL) 100 MG tablet; Take 2 tablets (200 mg) by mouth At Bedtime    Persistent depressive disorder  In remission  The current medical regimen is effective;  continue present plan and medications.   - sertraline (ZOLOFT) 100 MG tablet; Take 1 tablet (100 mg) by mouth daily               No follow-ups on file.    Ai De La Paz NP  [facility]     Debbie Richardson is a 65 year old who presents to clinic today for the following health issues     HPI   F/u rash      Depression Followup    How are you doing with your depression since your last visit? No change    Are you having other symptoms that might be associated with depression? No    Have you had a significant life event?  OTHER: moved this fall     Are you feeling anxious or having panic attacks?   No    Do you have any concerns with your use of alcohol or other drugs? No    Social History     Tobacco Use     Smoking status: Never Smoker     Smokeless tobacco: Never Used   Substance Use Topics     Alcohol use: Yes     Alcohol/week: 0.0 standard drinks     Comment: Moderate drinker     Drug use: No     PHQ 1/15/2020 7/7/2020 12/28/2020   PHQ-9 Total Score 1 1 1   Q9: Thoughts of better off dead/self-harm past 2 weeks Not at all Not at all Not at all     SANCHEZ-7 SCORE 3/7/2019 6/3/2019 12/18/2019   Total Score - - -   Total Score - - 6 (mild anxiety)   Total Score 0 0 6           Suicide Assessment Five-step  Evaluation and Treatment (SAFE-T)      How many servings of fruits and vegetables do you eat daily?  2-3    On average, how many sweetened beverages do you drink each day (Examples: soda, juice, sweet tea, etc.  Do NOT count diet or artificially sweetened beverages)?   0    How many days per week do you exercise enough to make your heart beat faster? 4    How many minutes a day do you exercise enough to make your heart beat faster? 30 - 60    How many days per week do you miss taking your medication? 0    She is doing very well on her medications.  Feels like her mood is good, despite the pandemic and social unrest.   She is sleeping well.    Review of Systems         Objective           Vitals:  No vitals were obtained today due to virtual visit.    Physical Exam   GENERAL: Healthy, alert and no distress  EYES: Eyes grossly normal to inspection.  No discharge or erythema, or obvious scleral/conjunctival abnormalities.  RESP: No audible wheeze, cough, or visible cyanosis.  No visible retractions or increased work of breathing.    SKIN: Visible skin clear. No significant rash, abnormal pigmentation or lesions.  NEURO: Cranial nerves grossly intact.  Mentation and speech appropriate for age.  PSYCH: Mentation appears normal, affect normal/bright, judgement and insight intact, normal speech and appearance well-groomed.                Video-Visit Details    Type of service:  Video Visit    Video End Time:11:48 AM    Originating Location (pt. Location): Home    Distant Location (provider location):  [facility]     Platform used for Video Visit: [employer]

## 2021-01-15 ENCOUNTER — HEALTH MAINTENANCE LETTER (OUTPATIENT)
Age: 66
End: 2021-01-15

## 2021-01-30 ENCOUNTER — MYC MEDICAL ADVICE (OUTPATIENT)
Dept: FAMILY MEDICINE | Facility: CLINIC | Age: 66
End: 2021-01-30

## 2021-01-30 DIAGNOSIS — R21 RASH: Primary | ICD-10-CM

## 2021-02-01 NOTE — TELEPHONE ENCOUNTER
Writer responded via [employer].  DU MarinoN, RN  [facility]

## 2021-02-02 ENCOUNTER — TRANSFERRED RECORDS (OUTPATIENT)
Dept: HEALTH INFORMATION MANAGEMENT | Facility: CLINIC | Age: 66
End: 2021-02-02

## 2021-03-03 DIAGNOSIS — N95.2 VAGINAL ATROPHY: ICD-10-CM

## 2021-03-10 ENCOUNTER — TRANSFERRED RECORDS (OUTPATIENT)
Dept: HEALTH INFORMATION MANAGEMENT | Facility: CLINIC | Age: 66
End: 2021-03-10

## 2021-03-16 ENCOUNTER — E-VISIT (OUTPATIENT)
Dept: URGENT CARE | Facility: URGENT CARE | Age: 66
End: 2021-03-16
Payer: COMMERCIAL

## 2021-03-16 DIAGNOSIS — Z20.822 SUSPECTED COVID-19 VIRUS INFECTION: ICD-10-CM

## 2021-03-16 DIAGNOSIS — Z20.822 SUSPECTED COVID-19 VIRUS INFECTION: Primary | ICD-10-CM

## 2021-03-16 LAB
SARS-COV-2 RNA RESP QL NAA+PROBE: NORMAL
SPECIMEN SOURCE: NORMAL

## 2021-03-16 PROCEDURE — 99421 OL DIG E/M SVC 5-10 MIN: CPT | Performed by: NURSE PRACTITIONER

## 2021-03-16 PROCEDURE — 99207 PR NO CHARGE LOS: CPT

## 2021-03-16 PROCEDURE — U0005 INFEC AGEN DETEC AMPLI PROBE: HCPCS | Performed by: NURSE PRACTITIONER

## 2021-03-16 PROCEDURE — U0003 INFECTIOUS AGENT DETECTION BY NUCLEIC ACID (DNA OR RNA); SEVERE ACUTE RESPIRATORY SYNDROME CORONAVIRUS 2 (SARS-COV-2) (CORONAVIRUS DISEASE [COVID-19]), AMPLIFIED PROBE TECHNIQUE, MAKING USE OF HIGH THROUGHPUT TECHNOLOGIES AS DESCRIBED BY CMS-2020-01-R: HCPCS | Performed by: NURSE PRACTITIONER

## 2021-03-16 NOTE — PATIENT INSTRUCTIONS
Dear Debbie Richardson,    Your symptoms show that you may have coronavirus (COVID-19). This illness can cause fever, cough and trouble breathing. Many people get a mild case and get better on their own. Some people can get very sick.    Will I be tested for COVID-19?  We would like to test you for Covid-19 virus. I have placed orders for this test.     To schedule: go to your [employer] home page and scroll down to the section that says  You have an appointment that needs to be scheduled  and click the large green button that says  Schedule Now  and follow the steps to find the next available openings.    If you are unable to complete these [employer] scheduling steps, please call [phone] to schedule your testing.     Return to work/school/ guidance:  Please let your workplace manager and staffing office know when your quarantine ends     We can t give you an exact date as it depends on the above. You can calculate this on your own or work with your manager/staffing office to calculate this. (For example if you were exposed on 10/4, you would have to quarantine for 14 full days. That would be through 10/18. You could return on 10/19.)      If you receive a positive COVID-19 test result, follow the guidance of the those who are giving you the results. Usually the return to work is 10 (or in some cases 20 days from symptom onset.) If you work at [facility], you must also be cleared by Employee Occupational Health and Safety to return to work.        If you receive a negative COVID-19 test result and did not have a high risk exposure to someone with a known positive COVID-19 test, you can return to work once you're free of fever for 24 hours without fever-reducing medication and your symptoms are improving or resolved.      If you receive a negative COVID-19 test and If you had a high risk exposure to someone who has tested positive for COVID-19 then you can return to work 14 days after your last  contact with the positive individual    Note: If you have ongoing exposure to the covid positive person, this quarantine period may be more than 14 days. (For example, if you are continued to be exposed to your child who tested positive and cannot isolate from them, then the quarantine of 7-14 days can't start until your child is no longer contagious. This is typically 10 days from onset of the child's symptoms. So the total duration may be 17-24 days in this case.)    Sign up for [employer].   We know it's scary to hear that you might have COVID-19. We want to track your symptoms to make sure you're okay over the next 2 weeks. Please look for an email from [employer]--this is a free, online program that we'll use to keep in touch. To sign up, follow the link in the email you will receive. Learn more at [email or web address]    How can I take care of myself?    Get lots of rest. Drink extra fluids (unless a doctor has told you not to)    Take Tylenol (acetaminophen) or ibuprofen for fever or pain. If you have liver or kidney problems, ask your family doctor if it's okay to take Tylenol o ibuprofen    If you have other health problems (like cancer, heart failure, an organ transplant or severe kidney disease): Call your specialty clinic if you don't feel better in the next 2 days.    Know when to call 911. Emergency warning signs include:  o Trouble breathing or shortness of breath  o Pain or pressure in the chest that doesn't go away  o Feeling confused like you haven't felt before, or not being able to wake up  o Bluish-colored lips or face    Where can I get more information?  [facility] - About COVID-19:   [email or web address]    CDC - What to Do If You're Sick:   www.cdc.gov/coronavirus/2019-ncov/about/steps-when-sick.html

## 2021-03-17 LAB
LABORATORY COMMENT REPORT: NORMAL
SARS-COV-2 RNA RESP QL NAA+PROBE: NEGATIVE
SPECIMEN SOURCE: NORMAL

## 2021-04-01 ENCOUNTER — IMMUNIZATION (OUTPATIENT)
Dept: NURSING | Facility: CLINIC | Age: 66
End: 2021-04-01
Payer: COMMERCIAL

## 2021-04-01 PROCEDURE — 91300 PR COVID VAC PFIZER DIL RECON 30 MCG/0.3 ML IM: CPT

## 2021-04-01 PROCEDURE — 0001A PR COVID VAC PFIZER DIL RECON 30 MCG/0.3 ML IM: CPT

## 2021-04-12 ENCOUNTER — TRANSFERRED RECORDS (OUTPATIENT)
Dept: HEALTH INFORMATION MANAGEMENT | Facility: CLINIC | Age: 66
End: 2021-04-12

## 2021-04-22 ENCOUNTER — IMMUNIZATION (OUTPATIENT)
Dept: NURSING | Facility: CLINIC | Age: 66
End: 2021-04-22
Attending: INTERNAL MEDICINE
Payer: COMMERCIAL

## 2021-04-22 PROCEDURE — 0002A PR COVID VAC PFIZER DIL RECON 30 MCG/0.3 ML IM: CPT

## 2021-04-22 PROCEDURE — 91300 PR COVID VAC PFIZER DIL RECON 30 MCG/0.3 ML IM: CPT

## 2021-07-01 ENCOUNTER — MYC MEDICAL ADVICE (OUTPATIENT)
Dept: OPTOMETRY | Facility: CLINIC | Age: 66
End: 2021-07-01

## 2021-07-01 ENCOUNTER — MYC MEDICAL ADVICE (OUTPATIENT)
Dept: FAMILY MEDICINE | Facility: CLINIC | Age: 66
End: 2021-07-01

## 2021-07-01 DIAGNOSIS — M25.561 RIGHT KNEE PAIN: Primary | ICD-10-CM

## 2021-07-01 DIAGNOSIS — M25.561 RIGHT KNEE PAIN, UNSPECIFIED CHRONICITY: ICD-10-CM

## 2021-07-04 ENCOUNTER — HEALTH MAINTENANCE LETTER (OUTPATIENT)
Age: 66
End: 2021-07-04

## 2021-07-05 NOTE — TELEPHONE ENCOUNTER
My Chart message and questionnaire sent to patient.  Orders pended.  Please review and sign if you agree.  Did you want to see patient.  Chayo Grant RN  [facility]

## 2021-07-05 NOTE — TELEPHONE ENCOUNTER
Writer responded via [employer].    DU MarinoN, RN  [facility]

## 2021-07-07 NOTE — TELEPHONE ENCOUNTER
DIAGNOSIS: Right knee pain/ Ai De La Paz NP/ no images   APPOINTMENT DATE: 7.12.21   NOTES STATUS DETAILS   OFFICE NOTE from referring provider N/A    OFFICE NOTE from other specialist Scanned 6.28.21 [facility]   DISCHARGE SUMMARY from hospital N/A    DISCHARGE REPORT from the ER N/A    OPERATIVE REPORT N/A    EMG report N/A    MEDICATION LIST Internal    MRI N/A    DEXA (osteoporosis/bone health) N/A    CT SCAN N/A    XRAYS (IMAGES & REPORTS) N/A      Action 7.7.21 9:50 AM JEFFREY   Action Taken Sent PAWEL to [email or web address]

## 2021-07-10 DIAGNOSIS — M25.561 RIGHT KNEE PAIN: Primary | ICD-10-CM

## 2021-07-12 ENCOUNTER — OFFICE VISIT (OUTPATIENT)
Dept: ORTHOPEDICS | Facility: CLINIC | Age: 66
End: 2021-07-12
Attending: NURSE PRACTITIONER
Payer: COMMERCIAL

## 2021-07-12 ENCOUNTER — ANCILLARY PROCEDURE (OUTPATIENT)
Dept: GENERAL RADIOLOGY | Facility: CLINIC | Age: 66
End: 2021-07-12
Attending: FAMILY MEDICINE
Payer: COMMERCIAL

## 2021-07-12 ENCOUNTER — PRE VISIT (OUTPATIENT)
Dept: ORTHOPEDICS | Facility: CLINIC | Age: 66
End: 2021-07-12

## 2021-07-12 DIAGNOSIS — M25.561 RIGHT KNEE PAIN, UNSPECIFIED CHRONICITY: ICD-10-CM

## 2021-07-12 PROCEDURE — 99203 OFFICE O/P NEW LOW 30 MIN: CPT | Performed by: FAMILY MEDICINE

## 2021-07-12 PROCEDURE — 73562 X-RAY EXAM OF KNEE 3: CPT | Mod: RT | Performed by: RADIOLOGY

## 2021-07-12 NOTE — LETTER
7/12/2021      RE: Debbie Richardson  [address]  [place] [address]         [facility]  SPORTS & ORTHOPEDIC CLINIC VISIT     Jul 12, 2021        ASSESSMENT & PLAN    65-year-old with right anterior knee pain that is suspected to be chondromalacia of the patellofemoral joint.    Reviewed imaging and assessment with patient in detail  Discussed bracing for comfort.  Brace provided today in clinic  She is also interested in course of physical therapy.  Referral provided.  We discussed the role for OTC NSAIDs as well as steroid injections for refractory flares.    Wilbert Harris MD  [facility] [place]    -----  Chief Complaint   Patient presents with     Right Knee - Pain       SUBJECTIVE  Debbie Richardson is a/an 65 year old female who is seen as an ER referral for evaluation of  Right knee pain.     The patient is seen by themselves.  The patient is Right handed    Onset: 1 month(s) ago. Reports insidious onset without acute precipitating event.  Location of Pain: right anterior lateral knee   Worsened by: random, walking on cement, walking with bad shoes  Better with: Rest  Treatments tried: no treatment tried to date  Associated symptoms: no distal numbness or tingling; denies swelling or warmth    Orthopedic/Surgical history: NO  Social History/Occupation: Retired      REVIEW OF SYSTEMS:    Do you have fever, chills, weight loss? No    Do you have any vision problems? No    Do you have any chest pain or edema? No    Do you have any shortness of breath or wheezing?  No    Do you have stomach problems? No    Do you have any numbness or focal weakness? No    Do you have diabetes? No    Do you have problems with bleeding or clotting? No    Do you have an rashes or other skin lesions? No    OBJECTIVE:  [facility] 07/13/2008      Patient is alert, No acute distress, pleasant and conversational.    Gait: nonantalgic. Normal heel toe  gait.    right knee:   Skin intact. No erythema or ecchymosis.  No effusion or soft tissue swelling.    AROM: Zero to approximately 135  with crepitus noted in the anterior knee    Palpation:   No TTP over the medial or lateral joint line.    Special Tests:  Negative bounce test, negative forced flexion and negative Jelly's.  No ligamentous laxity or pain with valgus or varus stress.  Negative Lachman's, Anterior Drawer and Posterior Drawer     Full Isometric quad strength, extensor mechanism in place     Neurovascularly intact in the lower extremity    Hip and Ankle with full AROM and nontender      RADIOLOGY:    3 view xrays of right knee performed and reviewed independently demonstrating no acute fracture. No significant djd. See EMR for formal radiology report.           Wilbert Harris MD

## 2021-07-12 NOTE — PROGRESS NOTES
[facility]  SPORTS & ORTHOPEDIC CLINIC VISIT     Jul 12, 2021        ASSESSMENT & PLAN    65-year-old with right anterior knee pain that is suspected to be chondromalacia of the patellofemoral joint.    Reviewed imaging and assessment with patient in detail  Discussed bracing for comfort.  Brace provided today in clinic  She is also interested in course of physical therapy.  Referral provided.  We discussed the role for OTC NSAIDs as well as steroid injections for refractory flares.    Wilbert Harris MD  [facility] [place]    -----  Chief Complaint   Patient presents with     Right Knee - Pain       SUBJECTIVE  Debbie VALDEZ Osmany Richardson is a/an 65 year old female who is seen as an ER referral for evaluation of  Right knee pain.     The patient is seen by themselves.  The patient is Right handed    Onset: 1 month(s) ago. Reports insidious onset without acute precipitating event.  Location of Pain: right anterior lateral knee   Worsened by: random, walking on cement, walking with bad shoes  Better with: Rest  Treatments tried: no treatment tried to date  Associated symptoms: no distal numbness or tingling; denies swelling or warmth    Orthopedic/Surgical history: NO  Social History/Occupation: Retired      REVIEW OF SYSTEMS:    Do you have fever, chills, weight loss? No    Do you have any vision problems? No    Do you have any chest pain or edema? No    Do you have any shortness of breath or wheezing?  No    Do you have stomach problems? No    Do you have any numbness or focal weakness? No    Do you have diabetes? No    Do you have problems with bleeding or clotting? No    Do you have an rashes or other skin lesions? No    OBJECTIVE:  [facility] 07/13/2008      Patient is alert, No acute distress, pleasant and conversational.    Gait: nonantalgic. Normal heel toe gait.    right knee:   Skin intact. No erythema or ecchymosis.  No effusion or soft tissue  swelling.    AROM: Zero to approximately 135  with crepitus noted in the anterior knee    Palpation:   No TTP over the medial or lateral joint line.    Special Tests:  Negative bounce test, negative forced flexion and negative Jelly's.  No ligamentous laxity or pain with valgus or varus stress.  Negative Lachman's, Anterior Drawer and Posterior Drawer     Full Isometric quad strength, extensor mechanism in place     Neurovascularly intact in the lower extremity    Hip and Ankle with full AROM and nontender      RADIOLOGY:    3 view xrays of right knee performed and reviewed independently demonstrating no acute fracture. No significant djd. See EMR for formal radiology report.

## 2021-07-13 ENCOUNTER — ANCILLARY PROCEDURE (OUTPATIENT)
Dept: MAMMOGRAPHY | Facility: CLINIC | Age: 66
End: 2021-07-13
Attending: NURSE PRACTITIONER
Payer: COMMERCIAL

## 2021-07-13 DIAGNOSIS — Z12.31 VISIT FOR SCREENING MAMMOGRAM: ICD-10-CM

## 2021-07-13 PROCEDURE — 77067 SCR MAMMO BI INCL CAD: CPT | Mod: 26 | Performed by: RADIOLOGY

## 2021-07-13 PROCEDURE — 77067 SCR MAMMO BI INCL CAD: CPT

## 2021-08-04 ENCOUNTER — MYC MEDICAL ADVICE (OUTPATIENT)
Dept: FAMILY MEDICINE | Facility: CLINIC | Age: 66
End: 2021-08-04

## 2021-08-04 DIAGNOSIS — Z20.822 COVID-19 RULED OUT: Primary | ICD-10-CM

## 2021-08-10 ENCOUNTER — E-VISIT (OUTPATIENT)
Dept: URGENT CARE | Facility: CLINIC | Age: 66
End: 2021-08-10
Payer: COMMERCIAL

## 2021-08-10 DIAGNOSIS — Z20.822 SUSPECTED COVID-19 VIRUS INFECTION: Primary | ICD-10-CM

## 2021-08-10 PROCEDURE — 99421 OL DIG E/M SVC 5-10 MIN: CPT | Performed by: NURSE PRACTITIONER

## 2021-08-11 NOTE — PATIENT INSTRUCTIONS
Dear Debbie Richardson,    Your symptoms show that you may have coronavirus (COVID-19). This illness can cause fever, cough and trouble breathing. Many people get a mild case and get better on their own. Some people can get very sick.    Will I be tested for COVID-19?  We would like to test you for Covid-19 virus. I have placed orders for this test.     To schedule: go to your [employer] home page and scroll down to the section that says  You have an appointment that needs to be scheduled  and click the large green button that says  Schedule Now  and follow the steps to find the next available openings.    If you are unable to complete these [employer] scheduling steps, please call [phone] to schedule your testing.     Return to work/school/ guidance:  Please let your workplace manager and staffing office know when your quarantine ends     We can t give you an exact date as it depends on the above. You can calculate this on your own or work with your manager/staffing office to calculate this. (For example if you were exposed on 10/4, you would have to quarantine for 14 full days. That would be through 10/18. You could return on 10/19.)      If you receive a positive COVID-19 test result, follow the guidance of the those who are giving you the results. Usually the return to work is 10 (or in some cases 20 days from symptom onset.) If you work at [facility], you must also be cleared by Employee Occupational Health and Safety to return to work.        If you receive a negative COVID-19 test result and did not have a high risk exposure to someone with a known positive COVID-19 test, you can return to work once you're free of fever for 24 hours without fever-reducing medication and your symptoms are improving or resolved.      If you receive a negative COVID-19 test and If you had a high risk exposure to someone who has tested positive for COVID-19 then you can return to work 14 days after your last  contact with the positive individual    Note: If you have ongoing exposure to the covid positive person, this quarantine period may be more than 14 days. (For example, if you are continued to be exposed to your child who tested positive and cannot isolate from them, then the quarantine of 7-14 days can't start until your child is no longer contagious. This is typically 10 days from onset of the child's symptoms. So the total duration may be 17-24 days in this case.)    Sign up for [employer].   We know it's scary to hear that you might have COVID-19. We want to track your symptoms to make sure you're okay over the next 2 weeks. Please look for an email from [employer]--this is a free, online program that we'll use to keep in touch. To sign up, follow the link in the email you will receive. Learn more at [email or web address]    How can I take care of myself?    Get lots of rest. Drink extra fluids (unless a doctor has told you not to)    Take Tylenol (acetaminophen) or ibuprofen for fever or pain. If you have liver or kidney problems, ask your family doctor if it's okay to take Tylenol o ibuprofen    If you have other health problems (like cancer, heart failure, an organ transplant or severe kidney disease): Call your specialty clinic if you don't feel better in the next 2 days.    Know when to call 911. Emergency warning signs include:  o Trouble breathing or shortness of breath  o Pain or pressure in the chest that doesn't go away  o Feeling confused like you haven't felt before, or not being able to wake up  o Bluish-colored lips or face    Where can I get more information?  [facility] - About COVID-19:   [email or web address]    CDC - What to Do If You're Sick:   www.cdc.gov/coronavirus/2019-ncov/about/steps-when-sick.html

## 2021-08-12 DIAGNOSIS — Z20.822 SUSPECTED COVID-19 VIRUS INFECTION: ICD-10-CM

## 2021-08-12 LAB — SARS-COV-2 RNA RESP QL NAA+PROBE: NEGATIVE

## 2021-08-12 PROCEDURE — U0003 INFECTIOUS AGENT DETECTION BY NUCLEIC ACID (DNA OR RNA); SEVERE ACUTE RESPIRATORY SYNDROME CORONAVIRUS 2 (SARS-COV-2) (CORONAVIRUS DISEASE [COVID-19]), AMPLIFIED PROBE TECHNIQUE, MAKING USE OF HIGH THROUGHPUT TECHNOLOGIES AS DESCRIBED BY CMS-2020-01-R: HCPCS

## 2021-08-12 PROCEDURE — U0005 INFEC AGEN DETEC AMPLI PROBE: HCPCS

## 2021-08-13 ENCOUNTER — THERAPY VISIT (OUTPATIENT)
Dept: PHYSICAL THERAPY | Facility: CLINIC | Age: 66
End: 2021-08-13
Attending: FAMILY MEDICINE
Payer: COMMERCIAL

## 2021-08-13 DIAGNOSIS — M25.561 RIGHT KNEE PAIN, UNSPECIFIED CHRONICITY: ICD-10-CM

## 2021-08-13 PROCEDURE — 97110 THERAPEUTIC EXERCISES: CPT | Mod: GP | Performed by: PHYSICAL THERAPIST

## 2021-08-13 PROCEDURE — 97161 PT EVAL LOW COMPLEX 20 MIN: CPT | Mod: GP | Performed by: PHYSICAL THERAPIST

## 2021-08-13 ASSESSMENT — ACTIVITIES OF DAILY LIVING (ADL)
GO UP STAIRS: ACTIVITY IS NOT DIFFICULT
STIFFNESS: I DO NOT HAVE THE SYMPTOM
PAIN: THE SYMPTOM AFFECTS MY ACTIVITY SLIGHTLY
SQUAT: ACTIVITY IS NOT DIFFICULT
RISE FROM A CHAIR: ACTIVITY IS NOT DIFFICULT
AS_A_RESULT_OF_YOUR_KNEE_INJURY,_HOW_WOULD_YOU_RATE_YOUR_CURRENT_LEVEL_OF_DAILY_ACTIVITY?: NEARLY NORMAL
STAND: ACTIVITY IS NOT DIFFICULT
SIT WITH YOUR KNEE BENT: ACTIVITY IS NOT DIFFICULT
GO DOWN STAIRS: ACTIVITY IS NOT DIFFICULT
KNEE_ACTIVITY_OF_DAILY_LIVING_SCORE: 87.14
GIVING WAY, BUCKLING OR SHIFTING OF KNEE: THE SYMPTOM AFFECTS MY ACTIVITY MODERATELY
SWELLING: I DO NOT HAVE THE SYMPTOM
WALK: ACTIVITY IS NOT DIFFICULT
RAW_SCORE: 61
HOW_WOULD_YOU_RATE_THE_CURRENT_FUNCTION_OF_YOUR_KNEE_DURING_YOUR_USUAL_DAILY_ACTIVITIES_ON_A_SCALE_FROM_0_TO_100_WITH_100_BEING_YOUR_LEVEL_OF_KNEE_FUNCTION_PRIOR_TO_YOUR_INJURY_AND_0_BEING_THE_INABILITY_TO_PERFORM_ANY_OF_YOUR_USUAL_DAILY_ACTIVITIES?: 95
KNEE_ACTIVITY_OF_DAILY_LIVING_SUM: 61
HOW_WOULD_YOU_RATE_THE_OVERALL_FUNCTION_OF_YOUR_KNEE_DURING_YOUR_USUAL_DAILY_ACTIVITIES?: NEARLY NORMAL
LIMPING: I DO NOT HAVE THE SYMPTOM
KNEEL ON THE FRONT OF YOUR KNEE: ACTIVITY IS VERY DIFFICULT
WEAKNESS: I DO NOT HAVE THE SYMPTOM
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
## 2021-08-20 ENCOUNTER — THERAPY VISIT (OUTPATIENT)
Dept: PHYSICAL THERAPY | Facility: CLINIC | Age: 66
End: 2021-08-20
Attending: FAMILY MEDICINE
Payer: COMMERCIAL

## 2021-08-20 DIAGNOSIS — M25.561 ACUTE PAIN OF RIGHT KNEE: ICD-10-CM

## 2021-08-20 PROCEDURE — 97110 THERAPEUTIC EXERCISES: CPT | Mod: GP | Performed by: PHYSICAL THERAPIST

## 2021-08-20 PROCEDURE — 97112 NEUROMUSCULAR REEDUCATION: CPT | Mod: GP | Performed by: PHYSICAL THERAPIST

## 2021-09-10 ENCOUNTER — THERAPY VISIT (OUTPATIENT)
Dept: PHYSICAL THERAPY | Facility: CLINIC | Age: 66
End: 2021-09-10
Payer: COMMERCIAL

## 2021-09-10 DIAGNOSIS — M25.561 ACUTE PAIN OF RIGHT KNEE: ICD-10-CM

## 2021-09-10 PROCEDURE — 97112 NEUROMUSCULAR REEDUCATION: CPT | Mod: GP | Performed by: PHYSICAL THERAPIST

## 2021-09-10 PROCEDURE — 97110 THERAPEUTIC EXERCISES: CPT | Mod: GP | Performed by: PHYSICAL THERAPIST

## 2021-09-10 ASSESSMENT — ACTIVITIES OF DAILY LIVING (ADL)
STAND: ACTIVITY IS NOT DIFFICULT
KNEEL ON THE FRONT OF YOUR KNEE: ACTIVITY IS FAIRLY DIFFICULT
KNEE_ACTIVITY_OF_DAILY_LIVING_SUM: 67
SWELLING: I DO NOT HAVE THE SYMPTOM
RAW_SCORE: 67
PAIN: I DO NOT HAVE THE SYMPTOM
LIMPING: I DO NOT HAVE THE SYMPTOM
GO DOWN STAIRS: ACTIVITY IS NOT DIFFICULT
SQUAT: ACTIVITY IS NOT DIFFICULT
WALK: ACTIVITY IS NOT DIFFICULT
GO UP STAIRS: ACTIVITY IS NOT DIFFICULT
WEAKNESS: I DO NOT HAVE THE SYMPTOM
HOW_WOULD_YOU_RATE_THE_OVERALL_FUNCTION_OF_YOUR_KNEE_DURING_YOUR_USUAL_DAILY_ACTIVITIES?: NORMAL
STIFFNESS: I DO NOT HAVE THE SYMPTOM
KNEE_ACTIVITY_OF_DAILY_LIVING_SCORE: 95.71
GIVING WAY, BUCKLING OR SHIFTING OF KNEE: I DO NOT HAVE THE SYMPTOM
HOW_WOULD_YOU_RATE_THE_CURRENT_FUNCTION_OF_YOUR_KNEE_DURING_YOUR_USUAL_DAILY_ACTIVITIES_ON_A_SCALE_FROM_0_TO_100_WITH_100_BEING_YOUR_LEVEL_OF_KNEE_FUNCTION_PRIOR_TO_YOUR_INJURY_AND_0_BEING_THE_INABILITY_TO_PERFORM_ANY_OF_YOUR_USUAL_DAILY_ACTIVITIES?: 95
RISE FROM A CHAIR: ACTIVITY IS NOT DIFFICULT
SIT WITH YOUR KNEE BENT: ACTIVITY IS NOT DIFFICULT
AS_A_RESULT_OF_YOUR_KNEE_INJURY,_HOW_WOULD_YOU_RATE_YOUR_CURRENT_LEVEL_OF_DAILY_ACTIVITY?: NORMAL

## 2021-09-10 NOTE — PROGRESS NOTES
Subjective:  The history is provided by the patient. No  was used.     Physical Exam       Knee Activity of Daily Living Score: 95.71            Objective:    Gait:    Gait Type:  Normal   Assistive Devices:  None                                                        Knee Evaluation:  ROM:  Strength wnl knee: Intermittent mild extensor lag on L with supine SLR.                  Edema:  Edema of the knee: Slight edema R knee.      Functional Testing:            Proprioception:   Stork Balance Test:  Left:  Normal control, no pain  Right:  Min loss of control, no pain  % of Uninvolved:           General     ROS    Assessment/Plan:    DISCHARGE REPORT    Progress reporting period is from 8/13/21 to 9/10/21.       SUBJECTIVE  Subjective changes noted by patient:  R knee pain better overall. Has been biking and walking without pain. Working on being more consistent with HEP.       Current Pain level: 0/10.     Initial Pain level: 10/10.   Changes in function:  Yes (See Goal flowsheet attached for changes in current functional level)  Adverse reaction to treatment or activity: None    OBJECTIVE  Changes noted in objective findings:  Yes, see objective findings.    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  Pain -  hot/cold therapy, manual therapy, splint/taping/bracing/orthotics, self management, education and home program  Decreased strength - therapeutic exercise, therapeutic activities and home program  Decreased proprioception - neuro re-education, therapeutic activities and home program  Edema - cold therapy and self management/home program  Impaired muscle performance - neuro re-education and home program  Decreased function - therapeutic activities and home program  STG/LTGs have been met or progress has been made towards goals:  Yes (See Goal flow sheet completed today.)  Assessment of Progress: The patient's condition  is improving.  Self Management Plans:  Patient is independent in a home treatment program.  Patient is independent in self management of symptoms.  I have re-evaluated this patient and find that the nature, scope, duration and intensity of the therapy is appropriate for the medical condition of the patient.  Debbie continues to require the following intervention to meet STG and LTG's:  PT intervention is no longer required to meet STG/LTG.    Recommendations:  This patient is ready to be discharged from therapy and continue their home treatment program.    Please refer to the daily flowsheet for treatment today, total treatment time and time spent performing 1:1 timed codes.

## 2021-11-09 ENCOUNTER — MYC MEDICAL ADVICE (OUTPATIENT)
Dept: FAMILY MEDICINE | Facility: CLINIC | Age: 66
End: 2021-11-09

## 2021-11-11 NOTE — TELEPHONE ENCOUNTER
Unique - Patient is noticing increase in some of her depression symptoms with the change in sunlight hours and wonders if a light box might help.  Please advise.  Chayo Grant RN  [facility]

## 2021-11-14 NOTE — TELEPHONE ENCOUNTER
Yes, a light box might be helpful.  She would want to get one that is 10,000 LUX and use it for at least 30 minutes every morning.

## 2021-11-15 ENCOUNTER — MYC MEDICAL ADVICE (OUTPATIENT)
Dept: FAMILY MEDICINE | Facility: CLINIC | Age: 66
End: 2021-11-15

## 2021-11-15 NOTE — TELEPHONE ENCOUNTER
Writer responded via [employer].    DU MarinoN, RN  [facility]

## 2021-11-17 NOTE — TELEPHONE ENCOUNTER
Noted.    No further action needed from triage.    DU MarinoN, RN  [facility]

## 2021-12-19 ENCOUNTER — HEALTH MAINTENANCE LETTER (OUTPATIENT)
Age: 66
End: 2021-12-19

## 2022-04-18 DIAGNOSIS — N95.2 VAGINAL ATROPHY: ICD-10-CM

## 2022-04-18 DIAGNOSIS — F34.1 PERSISTENT DEPRESSIVE DISORDER: ICD-10-CM

## 2022-04-19 RX ORDER — SERTRALINE HYDROCHLORIDE 100 MG/1
TABLET, FILM COATED ORAL
Qty: 90 TABLET | Refills: 0 | Status: SHIPPED | OUTPATIENT
Start: 2022-04-19 | End: 2022-05-02

## 2022-04-19 RX ORDER — CONJUGATED ESTROGENS 0.62 MG/G
CREAM VAGINAL
Qty: 60 G | Refills: 0 | Status: SHIPPED | OUTPATIENT
Start: 2022-04-19 | End: 2022-05-02

## 2022-04-19 NOTE — TELEPHONE ENCOUNTER
Patient is scheduled with THAO De La Paz CNP, on 5/2/22.    Prescriptions approved per [facility] Refill Protocol.  Thank you!  REGINA Marino, RN  [facility]      Warnings Override History for traZODone (DESYREL) 100 MG tablet [[id number]]    Overridden by Amanda Lockwood, RN on Apr 19, 2022 8:38 AM   Drug-Drug   1. SELECTIVE SEROTONIN REUPTAKE INHIBITORS / SEROTONERGIC NON-OPIOID CNS DEPRESSANTS [Level: Major]   Other Orders: sertraline (ZOLOFT) 100 MG tablet         Overridden by Ai De La Paz NP on Jan 7, 2021 11:47 AM   Drug-Drug   1. SELECTIVE SEROTONIN REUPTAKE INHIBITORS / SEROTONERGIC NON-OPIOID CNS DEPRESSANTS [Level: Major]   Other Orders: sertraline (ZOLOFT) 100 MG tablet      2. SELECTIVE SEROTONIN REUPTAKE INHIBITORS / SEROTONERGIC NON-OPIOID CNS DEPRESSANTS [Level: Major]   Other Orders: sertraline (ZOLOFT) 100 MG tablet

## 2022-04-20 ENCOUNTER — OFFICE VISIT (OUTPATIENT)
Dept: OPTOMETRY | Facility: CLINIC | Age: 67
End: 2022-04-20
Payer: COMMERCIAL

## 2022-04-20 DIAGNOSIS — H26.9 BILATERAL INCIPIENT CATARACTS: ICD-10-CM

## 2022-04-20 DIAGNOSIS — H02.889 MEIBOMIAN GLAND DYSFUNCTION: ICD-10-CM

## 2022-04-20 DIAGNOSIS — H52.13 MYOPIA OF BOTH EYES WITH ASTIGMATISM: Primary | ICD-10-CM

## 2022-04-20 DIAGNOSIS — H52.4 PRESBYOPIA: ICD-10-CM

## 2022-04-20 DIAGNOSIS — H52.203 MYOPIA OF BOTH EYES WITH ASTIGMATISM: Primary | ICD-10-CM

## 2022-04-20 PROCEDURE — 92015 DETERMINE REFRACTIVE STATE: CPT | Performed by: OPTOMETRIST

## 2022-04-20 PROCEDURE — 92014 COMPRE OPH EXAM EST PT 1/>: CPT | Performed by: OPTOMETRIST

## 2022-04-20 ASSESSMENT — REFRACTION_MANIFEST
OS_SPHERE: -2.50
OD_SPHERE: -2.50
OS_AXIS: 162
OD_ADD: +2.50
OD_CYLINDER: +2.00
OD_CYLINDER: +1.75
OD_SPHERE: -2.25
OS_AXIS: 160
OS_CYLINDER: +1.75
OD_AXIS: 011
METHOD_AUTOREFRACTION: 1
OS_CYLINDER: +2.00
OD_AXIS: 012
OS_ADD: +2.50
OS_SPHERE: -3.00

## 2022-04-20 ASSESSMENT — VISUAL ACUITY
OS_SC: 20/70
OD_SC: 20/80
OS_CC: 20/30-2
OD_CC: 20/30-2
OD_CC: 20/20
OS_CC: 20/25
OS_SC+: -1
OD_CC+: -1
CORRECTION_TYPE: GLASSES
OS_CC+: -2
METHOD: SNELLEN - LINEAR

## 2022-04-20 ASSESSMENT — CONF VISUAL FIELD
METHOD: COUNTING FINGERS
OS_NORMAL: 1
OD_NORMAL: 1

## 2022-04-20 ASSESSMENT — EXTERNAL EXAM - LEFT EYE: OS_EXAM: NORMAL

## 2022-04-20 ASSESSMENT — KERATOMETRY
OD_K2POWER_DIOPTERS: 44.62
OS_AXISANGLE2_DEGREES: 44
OS_K1POWER_DIOPTERS: 43.75
METHOD_AUTO_MANUAL: AUTOMATED
OD_K1POWER_DIOPTERS: 43.25
OS_K2POWER_DIOPTERS: 44.62
OD_AXISANGLE2_DEGREES: 122
OS_AXISANGLE_DEGREES: 134
OD_AXISANGLE_DEGREES: 32

## 2022-04-20 ASSESSMENT — REFRACTION_WEARINGRX
OD_ADD: +2.75
OS_AXIS: 155
OD_CYLINDER: +1.75
OD_SPHERE: -2.25
SPECS_TYPE: PAL
OS_ADD: +2.75
OD_AXIS: 015
OS_CYLINDER: +2.00
OS_SPHERE: -2.75

## 2022-04-20 ASSESSMENT — EXTERNAL EXAM - RIGHT EYE: OD_EXAM: NORMAL

## 2022-04-20 ASSESSMENT — CUP TO DISC RATIO
OS_RATIO: 0.2
OD_RATIO: 0.1

## 2022-04-20 ASSESSMENT — SLIT LAMP EXAM - LIDS: COMMENTS: MEIBOMIAN GLAND DYSFUNCTION

## 2022-04-20 ASSESSMENT — TONOMETRY
OS_IOP_MMHG: 17
OD_IOP_MMHG: 17
IOP_METHOD: APPLANATION

## 2022-04-20 NOTE — PROGRESS NOTES
Chief Complaint   Patient presents with     Annual Eye Exam        Last Eye Exam: 02/26/2020  Dilated Previously: Yes, side effects of dilation explained today    What are you currently using to see?  glasses       Distance Vision Acuity: More trouble with glare at night    Near Vision Acuity: Not satisfied     Eye Comfort: dry  Do you use eye drops? : Yes: otc artficial tears morning and night the cloudy ones PF  Occupation or Hobbies: Retired    Eva Morton - Optometric Assistant           Medical, surgical and family histories reviewed and updated 4/20/2022.       OBJECTIVE: See Ophthalmology exam    ASSESSMENT:    ICD-10-CM    1. Myopia of both eyes with astigmatism  H52.13     H52.203    2. Presbyopia  H52.4    3. Bilateral incipient cataracts  H26.9    4. Meibomian gland dysfunction  H02.889        PLAN:   Warm compresses encouraged/ artificial tears as needed     Lara Navarrete OD

## 2022-04-20 NOTE — PATIENT INSTRUCTIONS
Meibomian gland dysfunction or Posterior Blepharitis, is characterized by inflammation along both the uppper and lower eyelid margins. A single row of these glands is present in each lid with openings along the lid margins.  It is often found in association with skin conditions such as rosacea and seborrheic dermatitis.    Symptoms include:  ?Red eyes  ?Gritty or burning sensation  ?Excessive tearing  ?Itchy eyelids  ?Red, swollen eyelids  ?Crusting or matting of eyelashes in the morning  ?Light sensitivity  ?Blurred vision    It is important to keep cosmetics from blocking these oil glands. If blocked, they do not   excrete oil into the tear film, which causes the tears to evaporate quickly.   This may result in watery eyes.  There is also an increase of bacterial growth when the tear film is unstable, leading to further ocular surface inflammation.    Treatment:  1. Warm compresses for 5-10 minutes twice daily     2.  Keep the eyelid margins clean by using a commercial eye scrub or mild baby shampoo on a washcloth 1-2x daily    3. Use preservative free artificial tears 4-8x daily     For warm compresses    Moisten a washcloth with hot water, or microwave for 10 seconds, being careful to not get the cloth too hot.   Then put the washcloth onto your eyelids for 5 minutes. It will cool quickly so a rice pack or eyemask that can be heated and laid on top of the washcloth will help retain the heat.    Omega 3 fatty acid supplements taken once to twice daily and artificial tears such as Soothe xp, Refresh optive , Retaine and systane balance are also an additional treatment to control inflammation and help soothe your eyes.

## 2022-04-20 NOTE — LETTER
4/20/2022         RE: Debbie Richardson  [address]  [place] [address]        Dear Colleague,    [facility] Please see a copy of my visit note below.    Chief Complaint   Patient presents with     Annual Eye Exam        Last Eye Exam: 02/26/2020  Dilated Previously: Yes, side effects of dilation explained today    What are you currently using to see?  glasses       Distance Vision Acuity: More trouble with glare at night    Near Vision Acuity: Not satisfied     Eye Comfort: dry  Do you use eye drops? : Yes: otc artficial tears morning and night the cloudy ones PF  Occupation or Hobbies: Retired    Eva Morton - Optometric Assistant           Medical, surgical and family histories reviewed and updated 4/20/2022.       OBJECTIVE: See Ophthalmology exam    ASSESSMENT:    ICD-10-CM    1. Myopia of both eyes with astigmatism  H52.13     H52.203    2. Presbyopia  H52.4    3. Bilateral incipient cataracts  H26.9    4. Meibomian gland dysfunction  H02.889        PLAN:   Warm compresses encouraged/ artificial tears as needed     Lara Navarrete OD         Again, thank you for allowing me to participate in the care of your patient.        Sincerely,        Lara Navarrete, OD

## 2022-05-02 ENCOUNTER — OFFICE VISIT (OUTPATIENT)
Dept: FAMILY MEDICINE | Facility: CLINIC | Age: 67
End: 2022-05-02
Payer: COMMERCIAL

## 2022-05-02 VITALS
WEIGHT: 168 LBS | HEIGHT: 69 IN | DIASTOLIC BLOOD PRESSURE: 66 MMHG | OXYGEN SATURATION: 97 % | RESPIRATION RATE: 16 BRPM | HEART RATE: 67 BPM | TEMPERATURE: 97.3 F | BODY MASS INDEX: 24.88 KG/M2 | SYSTOLIC BLOOD PRESSURE: 110 MMHG

## 2022-05-02 DIAGNOSIS — Z13.220 SCREENING CHOLESTEROL LEVEL: ICD-10-CM

## 2022-05-02 DIAGNOSIS — N95.2 VAGINAL ATROPHY: ICD-10-CM

## 2022-05-02 DIAGNOSIS — G47.00 INSOMNIA, UNSPECIFIED TYPE: ICD-10-CM

## 2022-05-02 DIAGNOSIS — Z78.0 ASYMPTOMATIC MENOPAUSAL STATE: ICD-10-CM

## 2022-05-02 DIAGNOSIS — Z00.00 ENCOUNTER FOR ANNUAL WELLNESS EXAM IN MEDICARE PATIENT: Primary | ICD-10-CM

## 2022-05-02 DIAGNOSIS — F33.0 MILD EPISODE OF RECURRENT MAJOR DEPRESSIVE DISORDER (H): ICD-10-CM

## 2022-05-02 DIAGNOSIS — Z13.1 SCREENING FOR DIABETES MELLITUS: ICD-10-CM

## 2022-05-02 DIAGNOSIS — N81.6 RECTOCELE: ICD-10-CM

## 2022-05-02 PROBLEM — F33.42 MAJOR DEPRESSIVE DISORDER, RECURRENT EPISODE, IN FULL REMISSION (H): Status: RESOLVED | Noted: 2018-01-09 | Resolved: 2022-05-02

## 2022-05-02 PROCEDURE — G0402 INITIAL PREVENTIVE EXAM: HCPCS | Performed by: NURSE PRACTITIONER

## 2022-05-02 PROCEDURE — 36415 COLL VENOUS BLD VENIPUNCTURE: CPT | Performed by: NURSE PRACTITIONER

## 2022-05-02 PROCEDURE — 82947 ASSAY GLUCOSE BLOOD QUANT: CPT | Performed by: NURSE PRACTITIONER

## 2022-05-02 PROCEDURE — G0009 ADMIN PNEUMOCOCCAL VACCINE: HCPCS | Performed by: NURSE PRACTITIONER

## 2022-05-02 PROCEDURE — 90732 PPSV23 VACC 2 YRS+ SUBQ/IM: CPT | Performed by: NURSE PRACTITIONER

## 2022-05-02 PROCEDURE — 99214 OFFICE O/P EST MOD 30 MIN: CPT | Mod: 25 | Performed by: NURSE PRACTITIONER

## 2022-05-02 PROCEDURE — 80061 LIPID PANEL: CPT | Performed by: NURSE PRACTITIONER

## 2022-05-02 RX ORDER — SERTRALINE HYDROCHLORIDE 100 MG/1
150 TABLET, FILM COATED ORAL DAILY
Qty: 135 TABLET | Refills: 3 | Status: SHIPPED | OUTPATIENT
Start: 2022-05-02 | End: 2023-05-14

## 2022-05-02 RX ORDER — TRAZODONE HYDROCHLORIDE 100 MG/1
150 TABLET ORAL AT BEDTIME
Qty: 135 TABLET | Refills: 3 | Status: SHIPPED | OUTPATIENT
Start: 2022-05-02 | End: 2023-04-17

## 2022-05-02 ASSESSMENT — ENCOUNTER SYMPTOMS
SHORTNESS OF BREATH: 0
ARTHRALGIAS: 1
COUGH: 0
FEVER: 0
HEADACHES: 0
BREAST MASS: 0
PALPITATIONS: 0
CONSTIPATION: 1
EYE PAIN: 0
WEAKNESS: 0
HEMATOCHEZIA: 0
JOINT SWELLING: 0
DIARRHEA: 1
NERVOUS/ANXIOUS: 0
HEMATURIA: 0
ABDOMINAL PAIN: 0
FREQUENCY: 1
HEARTBURN: 1
MYALGIAS: 1
NAUSEA: 0
DYSURIA: 0
DIZZINESS: 0
SORE THROAT: 0
CHILLS: 0

## 2022-05-02 ASSESSMENT — PATIENT HEALTH QUESTIONNAIRE - PHQ9: SUM OF ALL RESPONSES TO PHQ QUESTIONS 1-9: 3

## 2022-05-02 ASSESSMENT — ACTIVITIES OF DAILY LIVING (ADL): CURRENT_FUNCTION: NO ASSISTANCE NEEDED

## 2022-05-02 NOTE — PROGRESS NOTES
"SUBJECTIVE:   Debbie Richardson is a 66 year old female who presents for Preventive Visit.  Patient has been advised of split billing requirements and indicates understanding: Yes  Are you in the first 12 months of your Medicare coverage?  Yes,  Visual Acuity:  Right Eye: 20/20   Left Eye: 20/20  Both Eyes: 20/20    Healthy Habits:     In general, how would you rate your overall health?  Good    Frequency of exercise:  1 day/week    Duration of exercise:  Greater than 60 minutes    Do you usually eat at least 4 servings of fruit and vegetables a day, include whole grains    & fiber and avoid regularly eating high fat or \"junk\" foods?  No    Taking medications regularly:  Yes    Medication side effects:  Muscle aches and Significant flushing    Ability to successfully perform activities of daily living:  No assistance needed    Home Safety:  Throw rugs in the hallway    Hearing Impairment:  No hearing concerns    In the past 6 months, have you been bothered by leaking of urine? Yes    In general, how would you rate your overall mental or emotional health?  Fair      PHQ-2 Total Score: 2    Additional concerns today:  Yes  She feels like she is feeling more depressed.  She feels like she is isolating more, has more anhedonia.  She is planning on seeing a new therapist to work on issues with family.  She is currently taking 100 mg of Sertraline.    Sleep is stable on Trazodone.    She has vaginal mesh from a previous surgery.  She does have to splint when having a BM.  Lately she has been noticing some difficulty passing gas.  She describes being told that she likely has a rectocele when she had her last colonoscopy.     Do you feel safe in your environment? Yes    Have you ever done Advance Care Planning? (For example, a Health Directive, POLST, or a discussion with a medical provider or your loved ones about your wishes): Yes, patient states has an Advance Care Planning document and will bring a copy to the " clinic.       Fall risk  Fallen 2 or more times in the past year?: No  Any fall with injury in the past year?: No    Cognitive Screening   1) Repeat 3 items (Leader, Season, Table)    2) Clock draw: NORMAL  3) 3 item recall: Recalls 3 objects  Results: 3 items recalled: COGNITIVE IMPAIRMENT LESS LIKELY    Mini-CogTM Copyright RHIANNA Inman. Licensed by the author for use in [facility]; reprinted with permission (champ@[facility]). All rights reserved.      Do you have sleep apnea, excessive snoring or daytime drowsiness?: no    Reviewed and updated as needed this visit by clinical staff   Tobacco  Allergies  Meds   Med Hx  Surg Hx  Fam Hx  Soc Hx          Reviewed and updated as needed this visit by Provider                   Social History     Tobacco Use     Smoking status: Never Smoker     Smokeless tobacco: Never Used   Substance Use Topics     Alcohol use: Yes     Comment: 1-2 glasses of wine several times per week     If you drink alcohol do you typically have >3 drinks per day or >7 drinks per week? No    Alcohol Use 5/2/2022   Prescreen: >3 drinks/day or >7 drinks/week? No   Prescreen: >3 drinks/day or >7 drinks/week? -   No flowsheet data found.    Current providers sharing in care for this patient include:   Patient Care Team:  Ai De La Paz NP as PCP - General  Ai De La Paz NP as Assigned PCP  Wilbert Harris MD as Assigned Musculoskeletal Provider  Lara Navarrete OD as Assigned Surgical Provider    The following health maintenance items are reviewed in Epic and correct as of today:  Health Maintenance Due   Topic Date Due     ANNUAL REVIEW OF HM ORDERS  Never done     MEDICARE ANNUAL WELLNESS VISIT  11/14/2021     FALL RISK ASSESSMENT  Never done     PHQ-9  01/05/2022     COVID-19 Vaccine (4 - Booster for Pfizer series) 03/03/2022     LIPID  04/05/2022     Pneumococcal Vaccine: 65+ Years (1 - PCV) 11/14/2021           FHS-7:   Breast CA Risk Assessment (FHS-7)  "7/13/2021 4/29/2022 5/2/2022   Did any of your first-degree relatives have breast or ovarian cancer? No No No   Did any of your relatives have bilateral breast cancer? No No No   Did any man in your family have breast cancer? No No No   Did any woman in your family have breast and ovarian cancer? No No No   Did any woman in your family have breast cancer before age 50 y? No No No   Do you have 2 or more relatives with breast and/or ovarian cancer? No No No   Do you have 2 or more relatives with breast and/or bowel cancer? No Yes Yes         Pertinent mammograms are reviewed under the imaging tab.    Review of Systems   Constitutional: Negative for chills and fever.   HENT: Negative for congestion, ear pain, hearing loss and sore throat.    Eyes: Negative for pain and visual disturbance.   Respiratory: Negative for cough and shortness of breath.    Cardiovascular: Negative for chest pain, palpitations and peripheral edema.   Gastrointestinal: Positive for constipation, diarrhea and heartburn. Negative for abdominal pain, hematochezia and nausea.   Breasts:  Negative for tenderness, breast mass and discharge.   Genitourinary: Positive for frequency. Negative for dysuria, genital sores, hematuria, pelvic pain, urgency, vaginal bleeding and vaginal discharge.   Musculoskeletal: Positive for arthralgias and myalgias. Negative for joint swelling.   Skin: Positive for rash.   Neurological: Negative for dizziness, weakness and headaches.   Psychiatric/Behavioral: Positive for mood changes. The patient is not nervous/anxious.          OBJECTIVE:   /66   Pulse 67   Temp 97.3  F (36.3  C) (Temporal)   Resp 16   Ht 1.753 m (5' 9\")   Wt 76.2 kg (168 lb)   LMP 07/13/2008   SpO2 97%   BMI 24.81 kg/m   Estimated body mass index is 24.81 kg/m  as calculated from the following:    Height as of this encounter: 1.753 m (5' 9\").    Weight as of this encounter: 76.2 kg (168 lb).  Physical Exam  GENERAL: healthy, alert and " no distress  EYES: Eyes grossly normal to inspection, PERRL and conjunctivae and sclerae normal  HENT: ear canals and TM's normal, nose and mouth without ulcers or lesions  NECK: no adenopathy, no asymmetry, masses, or scars and thyroid normal to palpation  RESP: lungs clear to auscultation - no rales, rhonchi or wheezes  CV: regular rate and rhythm, normal S1 S2, no S3 or S4, no murmur, click or rub, no peripheral edema and peripheral pulses strong  ABDOMEN: soft, nontender, no hepatosplenomegaly, no masses and bowel sounds normal  MS: no gross musculoskeletal defects noted, no edema  SKIN: no suspicious lesions or rashes  NEURO: Normal strength and tone, mentation intact and speech normal  PSYCH: mentation appears normal, affect normal/bright        ASSESSMENT / PLAN:   (Z00.00) Encounter for annual wellness exam in Medicare patient  (primary encounter diagnosis)  Comment:   Plan:     (G47.00) Insomnia, unspecified type  Comment: stable  Plan: traZODone (DESYREL) 100 MG tablet        The current medical regimen is effective;  continue present plan and medications.     (F33.0) Mild episode of recurrent major depressive disorder (H)  Comment: not controlled  Plan: sertraline (ZOLOFT) 100 MG tablet        Will increase Sertraline to 150 mg and start seeing new therapist.   Follow up in 6-8 weeks if no improvement.     (N95.2) Vaginal atrophy  Comment:   Plan: conjugated estrogens (PREMARIN) 0.625 MG/GM         vaginal cream        Refills given.     (Z78.0) Asymptomatic menopausal state  Comment:   Plan: DX Hip/Pelvis/Spine            (Z13.220) Screening cholesterol level  Comment:   Plan: Lipid panel reflex to direct LDL Non-fasting            (Z13.1) Screening for diabetes mellitus  Comment:   Plan: Glucose            (N81.6) Rectocele  Comment:   Plan: Colorectal Surgery Referral        Will refer to colorectal for evaluation.           COUNSELING:  Reviewed preventive health counseling, as reflected in patient  "instructions    Estimated body mass index is 24.81 kg/m  as calculated from the following:    Height as of this encounter: 1.753 m (5' 9\").    Weight as of this encounter: 76.2 kg (168 lb).        She reports that she has never smoked. She has never used smokeless tobacco.      Appropriate preventive services were discussed with this patient, including applicable screening as appropriate for cardiovascular disease, diabetes, osteopenia/osteoporosis, and glaucoma.  As appropriate for age/gender, discussed screening for colorectal cancer, prostate cancer, breast cancer, and cervical cancer. Checklist reviewing preventive services available has been given to the patient.    Reviewed patients plan of care and provided an AVS. The Basic Care Plan (routine screening as documented in Health Maintenance) for Debbie meets the Care Plan requirement. This Care Plan has been established and reviewed with the Patient.    Counseling Resources:  ATP IV Guidelines  Pooled Cohorts Equation Calculator  Breast Cancer Risk Calculator  Breast Cancer: Medication to Reduce Risk  FRAX Risk Assessment  ICSI Preventive Guidelines  Dietary Guidelines for Americans, 2010  USDA's MyPlate  ASA Prophylaxis  Lung CA Screening    Ai De La Paz NP  [facility]    Identified Health Risks:  "

## 2022-05-03 LAB
CHOLEST SERPL-MCNC: 181 MG/DL
FASTING STATUS PATIENT QL REPORTED: NO
FASTING STATUS PATIENT QL REPORTED: NO
GLUCOSE BLD-MCNC: 79 MG/DL (ref 70–99)
HDLC SERPL-MCNC: 63 MG/DL
LDLC SERPL CALC-MCNC: 101 MG/DL
NONHDLC SERPL-MCNC: 118 MG/DL
TRIGL SERPL-MCNC: 83 MG/DL

## 2022-06-28 ENCOUNTER — MYC MEDICAL ADVICE (OUTPATIENT)
Dept: FAMILY MEDICINE | Facility: CLINIC | Age: 67
End: 2022-06-28

## 2022-07-05 ENCOUNTER — ANCILLARY PROCEDURE (OUTPATIENT)
Dept: BONE DENSITY | Facility: CLINIC | Age: 67
End: 2022-07-05
Attending: NURSE PRACTITIONER
Payer: COMMERCIAL

## 2022-07-05 DIAGNOSIS — Z78.0 ASYMPTOMATIC MENOPAUSAL STATE: ICD-10-CM

## 2022-07-05 PROCEDURE — 77080 DXA BONE DENSITY AXIAL: CPT | Performed by: INTERNAL MEDICINE

## 2022-07-18 ENCOUNTER — ANCILLARY PROCEDURE (OUTPATIENT)
Dept: MAMMOGRAPHY | Facility: CLINIC | Age: 67
End: 2022-07-18
Attending: NURSE PRACTITIONER
Payer: COMMERCIAL

## 2022-07-18 DIAGNOSIS — Z12.31 VISIT FOR SCREENING MAMMOGRAM: ICD-10-CM

## 2022-07-18 PROCEDURE — 77067 SCR MAMMO BI INCL CAD: CPT

## 2022-07-18 PROCEDURE — 77063 BREAST TOMOSYNTHESIS BI: CPT | Mod: 26 | Performed by: RADIOLOGY

## 2022-07-18 PROCEDURE — 77067 SCR MAMMO BI INCL CAD: CPT | Mod: 26 | Performed by: RADIOLOGY

## 2022-09-01 ENCOUNTER — E-VISIT (OUTPATIENT)
Dept: URGENT CARE | Facility: CLINIC | Age: 67
End: 2022-09-01
Payer: COMMERCIAL

## 2022-09-01 DIAGNOSIS — Z20.822 SUSPECTED COVID-19 VIRUS INFECTION: ICD-10-CM

## 2022-09-01 DIAGNOSIS — J02.9 SORE THROAT: ICD-10-CM

## 2022-09-01 PROCEDURE — 99421 OL DIG E/M SVC 5-10 MIN: CPT | Mod: CS | Performed by: FAMILY MEDICINE

## 2022-09-01 NOTE — PATIENT INSTRUCTIONS
Dear Debbie,    Your symptoms show that you may have coronavirus (COVID-19).     Because you also reported sore throat, I would like to also test you for Strep Throat to determine if we need to treat you for that as well.    What should I do?  We would like to test you for COVID-19 virus and Strep Throat. I have placed orders for these tests.   To schedule: go to your [employer] home page and scroll down to the section that says  You have an appointment that needs to be scheduled  and click the large green button that says  Schedule Now  and follow the steps to find the next available openings. It is important that when you are asked what the reason for your appointment is that you mention you need BOTH COVID and Strep tests.    If you are unable to complete these [employer] scheduling steps, please call [phone] to schedule your testing.     These guidelines are for isolating before returning to work, school or .     For employers, schools and day cares: This is an official notice for this person s medical guidelines for returning in-person.     For health care sites: The CDC gives different isolation and quarantine guidelines for healthcare sites, please check with these sites before arriving.     How do I self-isolate?  You isolate when you have symptoms of COVID or a test shows you have COVID, even if you don t have symptoms.     If you DO have symptoms:  o Stay home and away from others  - For at least 5 days after your symptoms started, AND   - You are fever free for 24 hours (with no medicine that reduces fever), AND  - Your other symptoms are better.  o Wear a mask for 10 full days any time you are around others.    If you DON T have symptoms:  o Stay at home and away from others for at least 5 days after your positive test.  o Wear a mask for 10 full days any time you are around others.    How can I take care of myself?  Over the counter medications may help with your symptoms such as runny or  stuffy nose, cough, chills, or fever. Talk to your care team about your options.   Some people are at high risk of severe illness (for example, you have a weak immune system, you re 65 years or older, or you have certain medical problems). If your risk is high and your symptoms started in the last 5 to 7 days, we strongly recommend for you to get COVID treatment as soon as possible. Paxlovid, Molnupiravir and the monoclonal antibody treatments are proven safe and effective, make you feel better faster, and prevent hospitalization and death.       To schedule an appointment to discuss COVID treatment, request an appointment on [employer] (select  COVID-19 Treatment ) or call [facility] ([phone]), press 7.    Get lots of rest. Drink extra fluids (unless a doctor has told you not to)    Take Tylenol (acetaminophen) or ibuprofen for fever or pain. If you have liver or kidney problems, ask your family doctor if it's okay to take Tylenol or ibuprofen    Take over the counter medications for your symptoms, as directed by your doctor. You may also talk to your pharmacist.      If you have other health problems (like cancer, heart failure, an organ transplant or severe kidney disease): Call your specialty clinic if you don't feel better in the next 2 days.    Know when to call 911. Emergency warning signs include:  o Trouble breathing or shortness of breath  o Pain or pressure in the chest that doesn't go away  o Feeling confused like you haven't felt before, or not being able to wake up  o Bluish-colored lips or face    Where can I get more information?    [facility] - About COVID-19: [email or web address]     CDC - What to Do If You're Sick: https://www.cdc.gov/coronavirus/2019-ncov/if-you-are-sick/index.html     CDC - Quarantine & Isolation: https://www.cdc.gov/coronavirus/2019-ncov/your-health/quarantine-isolation.html     [facility] clinical trials (COVID-19 research studies):  clinicalaffairs.[facility]-clinical-trials    Below are the COVID-19 hotlines at the [place] Department of Health ([facility]). Interpreters are available.  o For health questions: Call 810-531-4502 or 1-309.481.6275 (7 a.m. to 7 p.m.)  o For questions about schools and childcare: Call 294-097-1264 or 1-199.781.2705 (7 a.m. to 7 p.m.)

## 2022-09-02 ENCOUNTER — LAB (OUTPATIENT)
Dept: URGENT CARE | Facility: URGENT CARE | Age: 67
End: 2022-09-02
Attending: FAMILY MEDICINE
Payer: COMMERCIAL

## 2022-09-02 DIAGNOSIS — J02.9 SORE THROAT: ICD-10-CM

## 2022-09-02 DIAGNOSIS — Z20.822 SUSPECTED COVID-19 VIRUS INFECTION: ICD-10-CM

## 2022-09-02 LAB
DEPRECATED S PYO AG THROAT QL EIA: NEGATIVE
GROUP A STREP BY PCR: NOT DETECTED
SARS-COV-2 RNA RESP QL NAA+PROBE: NEGATIVE

## 2022-09-02 PROCEDURE — U0005 INFEC AGEN DETEC AMPLI PROBE: HCPCS

## 2022-09-02 PROCEDURE — 87651 STREP A DNA AMP PROBE: CPT

## 2022-09-02 PROCEDURE — U0003 INFECTIOUS AGENT DETECTION BY NUCLEIC ACID (DNA OR RNA); SEVERE ACUTE RESPIRATORY SYNDROME CORONAVIRUS 2 (SARS-COV-2) (CORONAVIRUS DISEASE [COVID-19]), AMPLIFIED PROBE TECHNIQUE, MAKING USE OF HIGH THROUGHPUT TECHNOLOGIES AS DESCRIBED BY CMS-2020-01-R: HCPCS

## 2022-10-13 ENCOUNTER — IMMUNIZATION (OUTPATIENT)
Dept: PEDIATRICS | Facility: CLINIC | Age: 67
End: 2022-10-13
Payer: COMMERCIAL

## 2022-10-13 DIAGNOSIS — Z23 HIGH PRIORITY FOR 2019-NCOV VACCINE: ICD-10-CM

## 2022-10-13 DIAGNOSIS — Z23 NEED FOR PROPHYLACTIC VACCINATION AND INOCULATION AGAINST INFLUENZA: ICD-10-CM

## 2022-10-13 PROCEDURE — G0008 ADMIN INFLUENZA VIRUS VAC: HCPCS | Mod: 59

## 2022-10-13 PROCEDURE — 99207 PR NO CHARGE NURSE ONLY: CPT

## 2022-10-13 PROCEDURE — 91312 COVID-19,PF,PFIZER BOOSTER BIVALENT: CPT

## 2022-10-13 PROCEDURE — 0124A COVID-19,PF,PFIZER BOOSTER BIVALENT: CPT

## 2022-10-13 PROCEDURE — 90662 IIV NO PRSV INCREASED AG IM: CPT

## 2022-12-13 ENCOUNTER — LAB (OUTPATIENT)
Dept: LAB | Facility: CLINIC | Age: 67
End: 2022-12-13
Payer: COMMERCIAL

## 2022-12-13 DIAGNOSIS — R79.89 ELEVATED SERUM CREATININE: ICD-10-CM

## 2022-12-13 LAB
ANION GAP SERPL CALCULATED.3IONS-SCNC: 9 MMOL/L (ref 7–15)
BUN SERPL-MCNC: 13 MG/DL (ref 8–23)
CALCIUM SERPL-MCNC: 9.3 MG/DL (ref 8.8–10.2)
CHLORIDE SERPL-SCNC: 102 MMOL/L (ref 98–107)
CREAT SERPL-MCNC: 0.84 MG/DL (ref 0.51–0.95)
DEPRECATED HCO3 PLAS-SCNC: 31 MMOL/L (ref 22–29)
GFR SERPL CREATININE-BSD FRML MDRD: 76 ML/MIN/1.73M2
GLUCOSE SERPL-MCNC: 89 MG/DL (ref 70–99)
POTASSIUM SERPL-SCNC: 4 MMOL/L (ref 3.4–5.3)
SODIUM SERPL-SCNC: 142 MMOL/L (ref 136–145)

## 2022-12-13 PROCEDURE — 36415 COLL VENOUS BLD VENIPUNCTURE: CPT

## 2022-12-13 PROCEDURE — 80048 BASIC METABOLIC PNL TOTAL CA: CPT

## 2022-12-17 ENCOUNTER — MYC MEDICAL ADVICE (OUTPATIENT)
Dept: FAMILY MEDICINE | Facility: CLINIC | Age: 67
End: 2022-12-17

## 2022-12-20 NOTE — TELEPHONE ENCOUNTER
Writer responded via [employer].    DU MarinoN, RN-BC  [facility]

## 2023-04-16 DIAGNOSIS — G47.00 INSOMNIA, UNSPECIFIED TYPE: ICD-10-CM

## 2023-04-17 ENCOUNTER — TELEPHONE (OUTPATIENT)
Dept: FAMILY MEDICINE | Facility: CLINIC | Age: 68
End: 2023-04-17
Payer: COMMERCIAL

## 2023-04-17 RX ORDER — TRAZODONE HYDROCHLORIDE 100 MG/1
TABLET ORAL
Qty: 135 TABLET | Refills: 0 | Status: SHIPPED | OUTPATIENT
Start: 2023-04-17 | End: 2023-06-21

## 2023-04-17 NOTE — TELEPHONE ENCOUNTER
Pt called back    She wants to confirm this is up to date because she doesn't remember getting it    Informed that we have it documented as given on 6/30/22 at the pharmacy. Advised she could call the pharmacy and ask for the record to be sure.    Cancelled appt for tomorrow    DU MatthewN RN  [facility]

## 2023-04-17 NOTE — TELEPHONE ENCOUNTER
LVM - please cancel visit tomorrow if pt calls back. Does not need to be seen for TDAP immunization, up to date per outside records as of 06/2022.    DU SanchezN, RN  [facility]

## 2023-04-17 NOTE — TELEPHONE ENCOUNTER
---Prescription approved per [facility] Refill Protocol.       Rena Salas RN BSN     [facility]      --Last visit:  5/2/2022 virtual [facility] for acute care.    --Future Visit: none with family practice provider.              Warnings Override History for traZODone (DESYREL) 100 MG tablet [[id number]]    Overridden by Ai De La Paz NP on May 2, 2022 12:08 PM   Drug-Drug   1. SELECTIVE SEROTONIN REUPTAKE INHIBITORS / SEROTONERGIC NON-OPIOID CNS DEPRESSANTS [Level: Major]   Other Orders: sertraline (ZOLOFT) 100 MG tablet      2. SELECTIVE SEROTONIN REUPTAKE INHIBITORS / SEROTONERGIC NON-OPIOID CNS DEPRESSANTS [Level: Major]   Other Orders: sertraline (ZOLOFT) 100 MG tablet      3. SELECTIVE SEROTONIN REUPTAKE INHIBITORS / SEROTONERGIC NON-OPIOID CNS DEPRESSANTS [Level: Major]   Other Orders: sertraline (ZOLOFT) 100 MG tablet

## 2023-04-20 ENCOUNTER — PATIENT OUTREACH (OUTPATIENT)
Dept: CARE COORDINATION | Facility: CLINIC | Age: 68
End: 2023-04-20
Payer: COMMERCIAL

## 2023-05-08 DIAGNOSIS — N95.2 VAGINAL ATROPHY: ICD-10-CM

## 2023-05-10 RX ORDER — CONJUGATED ESTROGENS 0.62 MG/G
CREAM VAGINAL
Qty: 60 G | Refills: 0 | Status: SHIPPED | OUTPATIENT
Start: 2023-05-10 | End: 2023-06-21

## 2023-05-10 NOTE — TELEPHONE ENCOUNTER
Medication is being filled for 1 time refill only due to:  Patient needs to be seen because it has been more than one year since last visit.    Team Coordinators: Please contact patient to set up annual physical for any further refills.     Tram Call RN  [facility]

## 2023-06-11 ENCOUNTER — HEALTH MAINTENANCE LETTER (OUTPATIENT)
Age: 68
End: 2023-06-11

## 2023-06-11 ENCOUNTER — TELEPHONE (OUTPATIENT)
Dept: FAMILY MEDICINE | Facility: CLINIC | Age: 68
End: 2023-06-11

## 2023-06-21 ENCOUNTER — VIRTUAL VISIT (OUTPATIENT)
Dept: FAMILY MEDICINE | Facility: CLINIC | Age: 68
End: 2023-06-21
Payer: COMMERCIAL

## 2023-06-21 DIAGNOSIS — M25.551 BILATERAL HIP PAIN: ICD-10-CM

## 2023-06-21 DIAGNOSIS — N95.2 VAGINAL ATROPHY: Primary | ICD-10-CM

## 2023-06-21 DIAGNOSIS — M25.552 BILATERAL HIP PAIN: ICD-10-CM

## 2023-06-21 DIAGNOSIS — G47.00 INSOMNIA, UNSPECIFIED TYPE: ICD-10-CM

## 2023-06-21 PROCEDURE — 99213 OFFICE O/P EST LOW 20 MIN: CPT | Mod: 95 | Performed by: NURSE PRACTITIONER

## 2023-06-21 RX ORDER — TRAZODONE HYDROCHLORIDE 100 MG/1
TABLET ORAL
Qty: 135 TABLET | Refills: 1 | Status: SHIPPED | OUTPATIENT
Start: 2023-06-21 | End: 2023-08-08

## 2023-06-21 ASSESSMENT — PATIENT HEALTH QUESTIONNAIRE - PHQ9
SUM OF ALL RESPONSES TO PHQ QUESTIONS 1-9: 3
10. IF YOU CHECKED OFF ANY PROBLEMS, HOW DIFFICULT HAVE THESE PROBLEMS MADE IT FOR YOU TO DO YOUR WORK, TAKE CARE OF THINGS AT HOME, OR GET ALONG WITH OTHER PEOPLE: NOT DIFFICULT AT ALL
SUM OF ALL RESPONSES TO PHQ QUESTIONS 1-9: 3

## 2023-06-21 NOTE — PROGRESS NOTES
Debbie is a 67 year old who is being evaluated via a billable telephone visit.      What phone number would you like to be contacted at? [phone]  How would you like to obtain your AVS? Keith  Distant Location (provider location):  On-site    Assessment & Plan     (N95.2) Vaginal atrophy  (primary encounter diagnosis)  Comment: stable  Plan: conjugated estrogens (PREMARIN) 0.625 MG/GM         vaginal cream        The current medical regimen is effective;  continue present plan and medications.     (G47.00) Insomnia, unspecified type  Comment: stable  Plan: traZODone (DESYREL) 100 MG tablet        The current medical regimen is effective;  continue present plan and medications.     (M25.551,  M25.552) Bilateral hip pain  Comment:   Plan: She declines a PT referral.  She will continue to work on stretching, exercises.  Will get a hip xray when she is in for her physical in August.                  Ai De La Paz NP  [facility]   Debbie is a 67 year old, presenting for the following health issues:  Recheck Medication         No data to display              History of Present Illness       Reason for visit:  To get the OK for future refills of Premerin vaginal cream.    She eats 2-3 servings of fruits and vegetables daily.She consumes 1 sweetened beverage(s) daily.She exercises with enough effort to increase her heart rate 10 to 19 minutes per day.  She exercises with enough effort to increase her heart rate 3 or less days per week.   She is taking medications regularly.    Today's PHQ-9         PHQ-9 Total Score: 3    PHQ-9 Q9 Thoughts of better off dead/self-harm past 2 weeks :   Not at all    How difficult have these problems made it for you to do your work, take care of things at home, or get along with other people: Not difficult at all     She has been having bilateral hip pain for the past three months.  It is fairly consistent, worse with inactivity.   Exercise is helpful.   She denies any back pain or radicular   She had hip dysplasia as a baby.  Her sister has rheumatoid arthritis.    She does need a refill on her Premarin and Trazodone until her physical.     Her depression is well-controlled on her current dose of Zoloft.  She sees a therapist twice a month.             Review of Systems         Objective           Vitals:  No vitals were obtained today due to virtual visit.    Physical Exam   healthy, alert and no distress  PSYCH: Alert and oriented times 3; coherent speech, normal   rate and volume, able to articulate logical thoughts, able   to abstract reason, no tangential thoughts, no hallucinations   or delusions  Her affect is normal  RESP: No cough, no audible wheezing, able to talk in full sentences  Remainder of exam unable to be completed due to telephone visits                Phone call duration: 16 minutes

## 2023-08-07 ASSESSMENT — PATIENT HEALTH QUESTIONNAIRE - PHQ9
SUM OF ALL RESPONSES TO PHQ QUESTIONS 1-9: 2
SUM OF ALL RESPONSES TO PHQ QUESTIONS 1-9: 2

## 2023-08-07 ASSESSMENT — ENCOUNTER SYMPTOMS
CHILLS: 0
FREQUENCY: 1
MYALGIAS: 0
ABDOMINAL PAIN: 0
ARTHRALGIAS: 0
NERVOUS/ANXIOUS: 1
HEADACHES: 0
BREAST MASS: 0
SORE THROAT: 0
WEAKNESS: 0
SHORTNESS OF BREATH: 0
FEVER: 0
HEMATURIA: 0
CONSTIPATION: 0
DYSURIA: 0
DIARRHEA: 0
JOINT SWELLING: 0
PALPITATIONS: 0
EYE PAIN: 0
DIZZINESS: 0
NAUSEA: 0
HEMATOCHEZIA: 0
HEARTBURN: 1
PARESTHESIAS: 0
COUGH: 0

## 2023-08-07 ASSESSMENT — ACTIVITIES OF DAILY LIVING (ADL): CURRENT_FUNCTION: NO ASSISTANCE NEEDED

## 2023-08-08 ENCOUNTER — OFFICE VISIT (OUTPATIENT)
Dept: FAMILY MEDICINE | Facility: CLINIC | Age: 68
End: 2023-08-08
Payer: COMMERCIAL

## 2023-08-08 ENCOUNTER — ANCILLARY PROCEDURE (OUTPATIENT)
Dept: GENERAL RADIOLOGY | Facility: CLINIC | Age: 68
End: 2023-08-08
Attending: NURSE PRACTITIONER
Payer: COMMERCIAL

## 2023-08-08 VITALS
BODY MASS INDEX: 25.87 KG/M2 | HEIGHT: 68 IN | TEMPERATURE: 97.1 F | WEIGHT: 170.7 LBS | RESPIRATION RATE: 18 BRPM | SYSTOLIC BLOOD PRESSURE: 112 MMHG | OXYGEN SATURATION: 97 % | HEART RATE: 60 BPM | DIASTOLIC BLOOD PRESSURE: 68 MMHG

## 2023-08-08 DIAGNOSIS — M25.552 BILATERAL HIP PAIN: ICD-10-CM

## 2023-08-08 DIAGNOSIS — M25.551 BILATERAL HIP PAIN: ICD-10-CM

## 2023-08-08 DIAGNOSIS — N95.2 VAGINAL ATROPHY: ICD-10-CM

## 2023-08-08 DIAGNOSIS — Z12.11 SPECIAL SCREENING FOR MALIGNANT NEOPLASMS, COLON: ICD-10-CM

## 2023-08-08 DIAGNOSIS — F33.0 MILD EPISODE OF RECURRENT MAJOR DEPRESSIVE DISORDER (H): ICD-10-CM

## 2023-08-08 DIAGNOSIS — G47.00 INSOMNIA, UNSPECIFIED TYPE: ICD-10-CM

## 2023-08-08 DIAGNOSIS — Z00.00 ENCOUNTER FOR MEDICARE ANNUAL WELLNESS EXAM: Primary | ICD-10-CM

## 2023-08-08 PROCEDURE — 90677 PCV20 VACCINE IM: CPT | Performed by: NURSE PRACTITIONER

## 2023-08-08 PROCEDURE — G0438 PPPS, INITIAL VISIT: HCPCS | Performed by: NURSE PRACTITIONER

## 2023-08-08 PROCEDURE — G0009 ADMIN PNEUMOCOCCAL VACCINE: HCPCS | Performed by: NURSE PRACTITIONER

## 2023-08-08 PROCEDURE — 99213 OFFICE O/P EST LOW 20 MIN: CPT | Mod: 25 | Performed by: NURSE PRACTITIONER

## 2023-08-08 PROCEDURE — 73522 X-RAY EXAM HIPS BI 3-4 VIEWS: CPT | Mod: TC | Performed by: RADIOLOGY

## 2023-08-08 RX ORDER — TRAZODONE HYDROCHLORIDE 100 MG/1
TABLET ORAL
Qty: 135 TABLET | Refills: 4 | Status: SHIPPED | OUTPATIENT
Start: 2023-08-08 | End: 2024-08-14

## 2023-08-08 RX ORDER — SERTRALINE HYDROCHLORIDE 100 MG/1
TABLET, FILM COATED ORAL
Qty: 135 TABLET | Refills: 4 | Status: SHIPPED | OUTPATIENT
Start: 2023-08-08 | End: 2024-08-14

## 2023-08-08 ASSESSMENT — ENCOUNTER SYMPTOMS
ARTHRALGIAS: 0
NERVOUS/ANXIOUS: 1
HEMATURIA: 0
BREAST MASS: 0
DIARRHEA: 0
SHORTNESS OF BREATH: 0
CONSTIPATION: 0
CHILLS: 0
FREQUENCY: 1
MYALGIAS: 0
COUGH: 0
DYSURIA: 0
NAUSEA: 0
HEMATOCHEZIA: 0
SORE THROAT: 0
JOINT SWELLING: 0
PALPITATIONS: 0
WEAKNESS: 0
HEADACHES: 0
FEVER: 0
EYE PAIN: 0
PARESTHESIAS: 0
HEARTBURN: 1
DIZZINESS: 0
ABDOMINAL PAIN: 0

## 2023-08-08 ASSESSMENT — PAIN SCALES - GENERAL: PAINLEVEL: NO PAIN (0)

## 2023-08-08 ASSESSMENT — ACTIVITIES OF DAILY LIVING (ADL): CURRENT_FUNCTION: NO ASSISTANCE NEEDED

## 2023-08-08 NOTE — PROGRESS NOTES
"SUBJECTIVE:   Debbie is a 67 year old who presents for Preventive Visit.      8/8/2023     2:56 PM   Additional Questions   Roomed by Amy   Accompanied by Self         8/8/2023     2:56 PM   Patient Reported Additional Medications   Patient reports taking the following new medications None       Are you in the first 12 months of your Medicare coverage?  No    Healthy Habits:     In general, how would you rate your overall health?  Good    Frequency of exercise:  2-3 days/week    Duration of exercise:  15-30 minutes    Do you usually eat at least 4 servings of fruit and vegetables a day, include whole grains    & fiber and avoid regularly eating high fat or \"junk\" foods?  No    Taking medications regularly:  Yes    Medication side effects:  None    Ability to successfully perform activities of daily living:  No assistance needed    Home Safety:  Throw rugs in the hallway    Hearing Impairment:  No hearing concerns    In the past 6 months, have you been bothered by leaking of urine? Yes    In general, how would you rate your overall mental or emotional health?  Good    Additional concerns today:  No    Notes from last visit in June:  She has been having bilateral hip pain (R>L) for the past three months.  It is fairly consistent, worse with inactivity.  Exercise is helpful.   She denies any back pain or radicular   She had hip dysplasia as a baby.  Her sister has rheumatoid arthritis.       Have you ever done Advance Care Planning? (For example, a Health Directive, POLST, or a discussion with a medical provider or your loved ones about your wishes): Yes, patient states has an Advance Care Planning document and will bring a copy to the clinic.       Fall risk  Fallen 2 or more times in the past year?: No  Any fall with injury in the past year?: Yes    Cognitive Screening   1) Repeat 3 items (Leader, Season, Table)    2) Clock draw: NORMAL  3) 3 item recall: Recalls 3 objects  Results: 3 items recalled: COGNITIVE " IMPAIRMENT LESS LIKELY    Mini-CogTM Copyright RHIANNA Inman. Licensed by the author for use in [facility]; reprinted with permission (champ@.[facility]). All rights reserved.      Do you have sleep apnea, excessive snoring or daytime drowsiness? : no    Reviewed and updated as needed this visit by clinical staff   Tobacco  Allergies  Meds              Reviewed and updated as needed this visit by Provider                 Social History     Tobacco Use     Smoking status: Never     Smokeless tobacco: Never   Substance Use Topics     Alcohol use: Yes     Comment: 1-2 glasses of wine several times per week             8/7/2023     6:59 PM   Alcohol Use   Prescreen: >3 drinks/day or >7 drinks/week? No     Do you have a current opioid prescription? No  Do you use any other controlled substances or medications that are not prescribed by a provider? Alcohol              Current providers sharing in care for this patient include:   Patient Care Team:  iA De La Paz NP as PCP - General  Ai De La Paz NP as Assigned PCP  Lara Navarrete OD as Assigned Surgical Provider    The following health maintenance items are reviewed in Epic and correct as of today:  Health Maintenance   Topic Date Due     COVID-19 Vaccine (6 - Pfizer series) 02/13/2023     MEDICARE ANNUAL WELLNESS VISIT  05/02/2023     Pneumococcal Vaccine: 65+ Years (2 - PCV) 05/02/2023     INFLUENZA VACCINE (1) 09/01/2023     ANNUAL REVIEW OF HM ORDERS  12/11/2023     PHQ-9  02/08/2024     MAMMO SCREENING  07/18/2024     FALL RISK ASSESSMENT  08/08/2024     LIPID  05/02/2027     ADVANCE CARE PLANNING  05/02/2027     COLORECTAL CANCER SCREENING  05/01/2029     DTAP/TDAP/TD IMMUNIZATION (4 - Td or Tdap) 06/30/2032     DEXA  07/05/2037     HEPATITIS C SCREENING  Completed     DEPRESSION ACTION PLAN  Completed     ZOSTER IMMUNIZATION  Completed     IPV IMMUNIZATION  Aged Out     MENINGITIS IMMUNIZATION  Aged Out     PAP  Discontinued  "          FHS-7:       7/13/2021     1:16 PM 4/29/2022     7:53 PM 5/2/2022    11:51 AM 7/18/2022    11:40 AM 8/7/2023     7:01 PM   Breast CA Risk Assessment (FHS-7)   Did any of your first-degree relatives have breast or ovarian cancer? No No No No No   Did any of your relatives have bilateral breast cancer? No No No No No   Did any man in your family have breast cancer? No No No No No   Did any woman in your family have breast and ovarian cancer? No No No No No   Did any woman in your family have breast cancer before age 50 y? No No No No No   Do you have 2 or more relatives with breast and/or ovarian cancer? No No No No No   Do you have 2 or more relatives with breast and/or bowel cancer? No Yes Yes No No         Pertinent mammograms are reviewed under the imaging tab.    Review of Systems   Constitutional:  Negative for chills and fever.   HENT:  Negative for congestion, ear pain, hearing loss and sore throat.    Eyes:  Negative for pain and visual disturbance.   Respiratory:  Negative for cough and shortness of breath.    Cardiovascular:  Negative for chest pain, palpitations and peripheral edema.   Gastrointestinal:  Positive for heartburn. Negative for abdominal pain, constipation, diarrhea, hematochezia and nausea.   Breasts:  Negative for tenderness, breast mass and discharge.   Genitourinary:  Positive for frequency and urgency. Negative for dysuria, genital sores, hematuria, pelvic pain, vaginal bleeding and vaginal discharge.   Musculoskeletal:  Negative for arthralgias, joint swelling and myalgias.   Skin:  Negative for rash.   Neurological:  Negative for dizziness, weakness, headaches and paresthesias.   Psychiatric/Behavioral:  Negative for mood changes. The patient is nervous/anxious.          OBJECTIVE:   /68 (BP Location: Right arm, Patient Position: Sitting, Cuff Size: Adult Regular)   Pulse 60   Temp 97.1  F (36.2  C) (Temporal)   Resp 18   Ht 1.73 m (5' 8.11\")   Wt 77.4 kg (170 lb " "11.2 oz)   LMP 07/13/2008   SpO2 97%   BMI 25.87 kg/m   Estimated body mass index is 25.87 kg/m  as calculated from the following:    Height as of this encounter: 1.73 m (5' 8.11\").    Weight as of this encounter: 77.4 kg (170 lb 11.2 oz).  Physical Exam  GENERAL: healthy, alert and no distress  EYES: Eyes grossly normal to inspection, PERRL and conjunctivae and sclerae normal  HENT: ear canals and TM's normal, nose and mouth without ulcers or lesions  NECK: no adenopathy, no asymmetry, masses, or scars and thyroid normal to palpation  RESP: lungs clear to auscultation - no rales, rhonchi or wheezes  CV: regular rate and rhythm, normal S1 S2, no S3 or S4, no murmur, click or rub, no peripheral edema and peripheral pulses strong  ABDOMEN: soft, nontender, no hepatosplenomegaly, no masses and bowel sounds normal  MS: FROM of the hips bilaterally; tenderness over the greater trochanteric bursae bilaterally  SKIN: no suspicious lesions or rashes  NEURO: Normal strength and tone, mentation intact and speech normal  PSYCH: mentation appears normal, affect normal/bright        ASSESSMENT / PLAN:   (Z00.00) Encounter for Medicare annual wellness exam  (primary encounter diagnosis)  Comment:   Plan:     (G47.00) Insomnia, unspecified type  Comment: stable  Plan: traZODone (DESYREL) 100 MG tablet        The current medical regimen is effective;  continue present plan and medications.     (F33.0) Mild episode of recurrent major depressive disorder (H)  Comment: stable  Plan: sertraline (ZOLOFT) 100 MG tablet        .cotn     (N95.2) Vaginal atrophy  Comment:   Plan: conjugated estrogens (PREMARIN) 0.625 MG/GM         vaginal cream            (M25.551,  M25.552) Bilateral hip pain  Comment:   Plan: CANCELED: XR Hip Right 2-3 Views, CANCELED: XR         Hip Left 2-3 Views        Likely bursitis.  Will have her take Ibuprofen 600 mg TID for 7 days, use ice and will refer to PT.  If symptomsr ae not improving, will refer to " sports medicine.     (Z12.11) Special screening for malignant neoplasms, colon  Comment:   Plan: Colonoscopy Screening  Referral                  COUNSELING:  Reviewed preventive health counseling, as reflected in patient instructions        She reports that she has never smoked. She has never used smokeless tobacco.      Appropriate preventive services were discussed with this patient, including applicable screening as appropriate for cardiovascular disease, diabetes, osteopenia/osteoporosis, and glaucoma.  As appropriate for age/gender, discussed screening for colorectal cancer, prostate cancer, breast cancer, and cervical cancer. Checklist reviewing preventive services available has been given to the patient.    Reviewed patients plan of care and provided an AVS. The Basic Care Plan (routine screening as documented in Health Maintenance) for Debbie meets the Care Plan requirement. This Care Plan has been established and reviewed with the Patient.          Ai De La Paz NP  [facility]    Identified Health Risks:  Answers submitted by the patient for this visit:  Patient Health Questionnaire (Submitted on 8/7/2023)  PHQ9 TOTAL SCORE: 2

## 2023-08-08 NOTE — PATIENT INSTRUCTIONS
Patient Education   Personalized Prevention Plan  You are due for the preventive services outlined below.  Your care team is available to assist you in scheduling these services.  If you have already completed any of these items, please share that information with your care team to update in your medical record.  Health Maintenance Due   Topic Date Due     COVID-19 Vaccine (6 - Pfizer series) 02/13/2023     Annual Wellness Visit  05/02/2023     Pneumococcal Vaccine (2 - PCV) 05/02/2023

## 2023-08-10 ENCOUNTER — THERAPY VISIT (OUTPATIENT)
Dept: PHYSICAL THERAPY | Facility: CLINIC | Age: 68
End: 2023-08-10
Attending: NURSE PRACTITIONER
Payer: COMMERCIAL

## 2023-08-10 DIAGNOSIS — M25.551 BILATERAL HIP PAIN: ICD-10-CM

## 2023-08-10 DIAGNOSIS — M25.552 BILATERAL HIP PAIN: ICD-10-CM

## 2023-08-10 PROCEDURE — 97161 PT EVAL LOW COMPLEX 20 MIN: CPT | Mod: GP | Performed by: PHYSICAL THERAPIST

## 2023-08-10 PROCEDURE — 97110 THERAPEUTIC EXERCISES: CPT | Mod: GP | Performed by: PHYSICAL THERAPIST

## 2023-08-10 ASSESSMENT — ACTIVITIES OF DAILY LIVING (ADL)
HOS_ADL_SCORE(%): 95
RECREATIONAL_ACTIVITIES: SLIGHT DIFFICULTY
ROLLING_OVER_IN_BED: NO DIFFICULTY AT ALL
HOS_ADL_ITEM_SCORE_TOTAL: 57
PUTTING_ON_SOCKS_AND_SHOES: NO DIFFICULTY AT ALL
WALKING_UP_STEEP_HILLS: NO DIFFICULTY AT ALL
WALKING_INITIALLY: SLIGHT DIFFICULTY
TWISTING/PIVOTING_ON_INVOLVED_LEG: NO DIFFICULTY AT ALL
LIGHT_TO_MODERATE_WORK: NO DIFFICULTY AT ALL
HOS_ADL_COUNT: 15
GOING_DOWN_1_FLIGHT_OF_STAIRS: NO DIFFICULTY AT ALL
GOING_UP_1_FLIGHT_OF_STAIRS: NO DIFFICULTY AT ALL
GETTING_INTO_AND_OUT_OF_AN_AVERAGE_CAR: NO DIFFICULTY AT ALL
WALKING_15_MINUTES_OR_GREATER: NO DIFFICULTY AT ALL
HOW_WOULD_YOU_RATE_YOUR_CURRENT_LEVEL_OF_FUNCTION_DURING_YOUR_USUAL_ACTIVITIES_OF_DAILY_LIVING_FROM_0_TO_100_WITH_100_BEING_YOUR_LEVEL_OF_FUNCTION_PRIOR_TO_YOUR_HIP_PROBLEM_AND_0_BEING_THE_INABILITY_TO_PERFORM_ANY_OF_YOUR_USUAL_DAILY_ACTIVITIES?: 90
STEPPING_UP_AND_DOWN_CURBS: NO DIFFICULTY AT ALL
STANDING_FOR_15_MINUTES: NO DIFFICULTY AT ALL
DEEP_SQUATTING: SLIGHT DIFFICULTY
WALKING_DOWN_STEEP_HILLS: NO DIFFICULTY AT ALL
SITTING_FOR_15_MINUTES: NO DIFFICULTY AT ALL
WALKING_APPROXIMATELY_10_MINUTES: NO DIFFICULTY AT ALL
HOS_ADL_HIGHEST_POTENTIAL_SCORE: 60

## 2023-08-10 NOTE — PROGRESS NOTES
"PHYSICAL THERAPY EVALUATION  Type of Visit: Evaluation    See electronic medical record for Abuse and Falls Screening details.    Debbie states that she has some difficulty initially walking, and once she gets going it is better. Certain yoga poses are painful. Mostly in the adductor, and has pain in a straddle position during intercourse. Getting up off the ground without struggling is hard. Does have bladder incontinence that getting a little worse. Avoiding the positions help alleviate the discomfort, and during yoga, does a figure four pose standing sometimes a relief and sometimes is painful. No numbness and tingling. 2x a week for yoga. Electric biking and walking. X-ray cleared for joint space narrowing.      Subjective       Presenting condition or subjective complaint: \"bursitis both hips\"  Date of onset: 23 (MD order)    Relevant medical history: Bladder or bowel problems; Depression; Dizziness; Incontinence; Menopause; Migraines or headaches; History of fractures   Dates & types of surgery: hysterectomy, pelvic floor mesh implant    Prior diagnostic imaging/testing results: X-ray     Prior therapy history for the same diagnosis, illness or injury: No      Prior Level of Function  Transfers: Independent  Ambulation: Independent  ADL: Independent  IADL: Housekeeping, Laundry, Meal preparation    Living Environment  Social support: With a significant other or spouse   Type of home: Apartment/condo   Stairs to enter the home: No   Is there a railing: No   Ramp: No   Stairs inside the home: No   Is there a railing: No   Help at home: None  Equipment owned: [employer]     Employment: No    Hobbies/Interests: biking, reading, gathering with family and friends    Patient goals for therapy: be comfortable during sex, getting up off floor without pain, pain free yoga    Pain assessment: Location: bilateral groin (R>L) /Ratin-7/10      Objective   HIP EVALUATION  PAIN: Pain is Exacerbated By: straddle " position for intercourse, getting up from ground, initial steps after sitting   Pain is Relieved By: rest and not going into these positions    POSTURE: Standing Posture: Lordosis increased  GAIT:   Weightbearing Status: WBAT  Assistive Device(s): None  Gait Deviations: WNL  BALANCE/PROPRIOCEPTION: WNL    ROM:   (Degrees) Left AROM Left PROM  Right AROM Right PROM   Hip Flexion  120 with pain at end range in groin  120 with pain at end range in groin    Hip Extension  20  20                 Hip Internal Rotation  26 + pain   16 + pain   Hip External Rotation  40  40   Lumbar Side glide Nil loss Nil loss   Lumbar Flexion Nil loss    Lumbar Extension Nil loss   Pain:   End feel:     PELVIC/SI SCREEN: Sacroiliac Provocation Test: - sacral thrust, - thigh thrust  STRENGTH:   Pain: - none + mild ++ moderate +++ severe  Strength Scale: 0-5/5 Left Right   Hip Flexion 5 5-, + (mild)   Hip Extension 4- 3+, + (mild)   Hip Abduction 4 4-   Hip Adduction 5    4-/5 at 90/90 5    4-/5 at 90/90   Hip Internal Rotation 5 4+   Hip External Rotation 4+ 4+   Knee Flexion 5 5   Knee Extension 5 5     LE FLEXIBILITY:  decreased bilateral adductors  SPECIAL TESTS:    Left Right   EMILIA Positive Positive   FADIR/Labrum/FAN Positive Positive   Femoral Nerve Negative  Negative    Popeye's Negative  Negative    Piriformis Negative  Negative    Quadrant Testing Negative  Negative    SLR Negative  Negative    Slump Negative  Negative    Stork with Extension Negative  Negative    César Negative  Negative           FUNCTIONAL TESTS: Double Leg Squat: Anterior knee translation, Knee valgus, Hip internal rotation, and Improper use of glutes/hips  PALPATION:  TTP along bilateral pectineus, bilateral adductors   JOINT MOBILITY: NT    Assessment & Plan   CLINICAL IMPRESSIONS  Medical Diagnosis: bilateral hip pain    Treatment Diagnosis: Bilateral hip impingement   Impression/Assessment: Patient is a 67 year old female with bilateral hip (R>L)  complaints.  The following significant findings have been identified: Pain, Decreased ROM/flexibility, Decreased joint mobility, and Decreased strength. These impairments interfere with their ability to perform self care tasks and recreational activities as compared to previous level of function.     Clinical Decision Making (Complexity):  Clinical Presentation: Stable/Uncomplicated  Clinical Presentation Rationale: based on medical and personal factors listed in PT evaluation  Clinical Decision Making (Complexity): Low complexity    PLAN OF CARE  Treatment Interventions:  Modalities: Dry Needling  Interventions: Gait Training, Manual Therapy, Neuromuscular Re-education, Therapeutic Activity, Therapeutic Exercise, Self-Care/Home Management    Long Term Goals     PT Goal 1  Goal Identifier: Squatting  Goal Description: Patient will perform deep sumo squat with proper form with 1/10 pain  Rationale: to maximize safety and independence within the home;to maximize safety and independence with performance of ADLs and functional tasks  Target Date: 11/02/23      Frequency of Treatment: 1x per week 4 weeks, 2x per month 2 months  Duration of Treatment: 84 days    Recommended Referrals to Other Professionals:  none  Education Assessment:        Risks and benefits of evaluation/treatment have been explained.   Patient/Family/caregiver agrees with Plan of Care.     Evaluation Time:     PT Eval, Low Complexity Minutes (29367): 20   Present: Not applicable     Signing Clinician: Gwen Srivastava, PT      [facility]                                                                                   OUTPATIENT PHYSICAL THERAPY      PLAN OF TREATMENT FOR OUTPATIENT REHABILITATION   Patient's Last Name, First Name, MDebbie Martinez YOB: 1955   Provider's Name   [facility]   Medical Record No.  [id number]     Onset Date: 08/08/23  (MD order)  Start of Care Date: 08/10/23     Medical Diagnosis:  bilateral hip pain      PT Treatment Diagnosis:  Bilateral hip impingement Plan of Treatment  Frequency/Duration: 1x per week 4 weeks, 2x per month 2 months/ 84 days    Certification date from 08/10/23 to 11/02/23         See note for plan of treatment details and functional goals     Gwen Srivastava, PT                         I CERTIFY THE NEED FOR THESE SERVICES FURNISHED UNDER        THIS PLAN OF TREATMENT AND WHILE UNDER MY CARE .             Physician Signature               Date    X_____________________________________________________                    Referring Provider:  Ai De La Paz      Initial Assessment  See Epic Evaluation- Start of Care Date: 08/10/23

## 2023-08-15 ENCOUNTER — THERAPY VISIT (OUTPATIENT)
Dept: PHYSICAL THERAPY | Facility: CLINIC | Age: 68
End: 2023-08-15
Attending: NURSE PRACTITIONER
Payer: COMMERCIAL

## 2023-08-15 DIAGNOSIS — M25.551 BILATERAL HIP PAIN: Primary | ICD-10-CM

## 2023-08-15 DIAGNOSIS — M25.552 BILATERAL HIP PAIN: Primary | ICD-10-CM

## 2023-08-15 PROCEDURE — 97140 MANUAL THERAPY 1/> REGIONS: CPT | Mod: GP | Performed by: PHYSICAL THERAPIST

## 2023-08-15 PROCEDURE — 97110 THERAPEUTIC EXERCISES: CPT | Mod: GP | Performed by: PHYSICAL THERAPIST

## 2023-09-11 ENCOUNTER — THERAPY VISIT (OUTPATIENT)
Dept: PHYSICAL THERAPY | Facility: CLINIC | Age: 68
End: 2023-09-11
Attending: NURSE PRACTITIONER
Payer: COMMERCIAL

## 2023-09-11 DIAGNOSIS — M25.551 BILATERAL HIP PAIN: Primary | ICD-10-CM

## 2023-09-11 DIAGNOSIS — M25.552 BILATERAL HIP PAIN: Primary | ICD-10-CM

## 2023-09-11 PROCEDURE — 97110 THERAPEUTIC EXERCISES: CPT | Mod: GP | Performed by: PHYSICAL THERAPIST

## 2023-09-11 PROCEDURE — 97140 MANUAL THERAPY 1/> REGIONS: CPT | Mod: GP | Performed by: PHYSICAL THERAPIST

## 2023-09-18 ENCOUNTER — THERAPY VISIT (OUTPATIENT)
Dept: PHYSICAL THERAPY | Facility: CLINIC | Age: 68
End: 2023-09-18
Payer: COMMERCIAL

## 2023-09-18 DIAGNOSIS — M25.552 BILATERAL HIP PAIN: Primary | ICD-10-CM

## 2023-09-18 DIAGNOSIS — M25.551 BILATERAL HIP PAIN: Primary | ICD-10-CM

## 2023-09-18 PROCEDURE — 97140 MANUAL THERAPY 1/> REGIONS: CPT | Mod: GP | Performed by: PHYSICAL THERAPIST

## 2023-09-18 PROCEDURE — 97110 THERAPEUTIC EXERCISES: CPT | Mod: GP | Performed by: PHYSICAL THERAPIST

## 2023-09-27 ENCOUNTER — TRANSFERRED RECORDS (OUTPATIENT)
Dept: HEALTH INFORMATION MANAGEMENT | Facility: CLINIC | Age: 68
End: 2023-09-27
Payer: COMMERCIAL

## 2023-09-28 ENCOUNTER — TELEPHONE (OUTPATIENT)
Dept: GASTROENTEROLOGY | Facility: CLINIC | Age: 68
End: 2023-09-28
Payer: COMMERCIAL

## 2023-09-28 NOTE — TELEPHONE ENCOUNTER
"Endoscopy Scheduling Screen    Have you had a positive Covid test in the last 14 days?  No    Are you active on MyChart?   Yes    What insurance is in the chart?  Other:  [facility]/medicare    Ordering/Referring Provider:     SESAR VALENZUELA      (If ordering provider performs procedure, schedule with ordering provider unless otherwise instructed. )    BMI: Estimated body mass index is 25.87 kg/m  as calculated from the following:    Height as of 8/8/23: 1.73 m (5' 8.11\").    Weight as of 8/8/23: 77.4 kg (170 lb 11.2 oz).     Sedation Ordered  moderate sedation.   If patient BMI > 50 do not schedule in [facility].    If patient BMI > 45 do not schedule at [facility].    Are you taking methadone or Suboxone?  No    Are you taking any prescription medications for pain 3 or more times per week?   No    Do you have a history of malignant hyperthermia or adverse reaction to anesthesia?  No    (Females) Are you currently pregnant?   No     Have you been diagnosed or told you have pulmonary hypertension?   No    Do you have an LVAD?  No    Have you been told you have moderate to severe sleep apnea?  No    Have you been told you have COPD, asthma, or any other lung disease?  No    Do you have any heart conditions?  No     Have you ever had an organ transplant?   No    Have you ever had or are you awaiting a heart or lung transplant?   No    Have you had a stroke or transient ischemic attack (TIA aka \"mini stroke\" in the last 6 months?   No    Have you been diagnosed with or been told you have cirrhosis of the liver?   No    Are you currently on dialysis?   No    Do you need assistance transferring?   No    BMI: Estimated body mass index is 25.87 kg/m  as calculated from the following:    Height as of 8/8/23: 1.73 m (5' 8.11\").    Weight as of 8/8/23: 77.4 kg (170 lb 11.2 oz).     Is patients BMI > 40 and scheduling location [facility]?  No    Do you take an injectable medication for weight loss or diabetes (excluding insulin)?  No    Do you take " the medication Naltrexone?  No    Do you take blood thinners?  No       Prep   Are you currently on dialysis or do you have chronic kidney disease?  No    Do you have a diagnosis of diabetes?  No    Do you have a diagnosis of cystic fibrosis (CF)?  No    On a regular basis do you go 3 -5 days between bowel movements?  No    BMI > 40?  No    Preferred Pharmacy:    [facility]  [address]  [facility] [address]  Phone: [phone] Fax: [phone]    [facility] - [place], [place] - [address]  [address] [place] [address]  Phone: [phone] Fax: [phone]    [id number] - [place], [place] - [address] AT [facility] [address]  [address]  [place] [address]  Phone: [phone] Fax: [phone]      Final Scheduling Details   Colonoscopy prep sent?  Standard MiraLAX    Procedure scheduled  Colonoscopy    Surgeon:  Sandra     Date of procedure:  12/13                                                                               Pre-OP / PAC:   No - Not required for this site.    Location  [facility] - Per order.    Sedation   Moderate Sedation - Per order.      Patient Reminders:   You will receive a call from a Nurse to review instructions and health history.  This assessment must be completed prior to your procedure.  Failure to complete the Nurse assessment may result in the procedure being cancelled.      On the day of your procedure, please designate an adult(s) who can drive you home stay with you for the next 24 hours. The medicines used in the exam will make you sleepy. You will not be able to drive.      You cannot take public transportation, ride share services, or non-medical taxi service without a responsible caregiver.  Medical transport services are allowed with the requirement that a responsible caregiver will receive you at your destination.  We require that drivers and caregivers are confirmed prior to your procedure.

## 2023-10-05 ENCOUNTER — THERAPY VISIT (OUTPATIENT)
Dept: PHYSICAL THERAPY | Facility: CLINIC | Age: 68
End: 2023-10-05
Payer: COMMERCIAL

## 2023-10-05 DIAGNOSIS — M25.551 BILATERAL HIP PAIN: Primary | ICD-10-CM

## 2023-10-05 DIAGNOSIS — M25.552 BILATERAL HIP PAIN: Primary | ICD-10-CM

## 2023-10-05 PROCEDURE — 97140 MANUAL THERAPY 1/> REGIONS: CPT | Mod: GP | Performed by: PHYSICAL THERAPIST

## 2023-10-05 PROCEDURE — 97110 THERAPEUTIC EXERCISES: CPT | Mod: GP | Performed by: PHYSICAL THERAPIST

## 2023-10-26 ENCOUNTER — IMMUNIZATION (OUTPATIENT)
Dept: NURSING | Facility: CLINIC | Age: 68
End: 2023-10-26
Payer: COMMERCIAL

## 2023-10-26 PROCEDURE — 90662 IIV NO PRSV INCREASED AG IM: CPT

## 2023-10-26 PROCEDURE — G0008 ADMIN INFLUENZA VIRUS VAC: HCPCS

## 2023-10-26 PROCEDURE — 90480 ADMN SARSCOV2 VAC 1/ONLY CMP: CPT

## 2023-10-26 PROCEDURE — 91320 SARSCV2 VAC 30MCG TRS-SUC IM: CPT

## 2023-10-31 ENCOUNTER — THERAPY VISIT (OUTPATIENT)
Dept: PHYSICAL THERAPY | Facility: CLINIC | Age: 68
End: 2023-10-31
Payer: COMMERCIAL

## 2023-10-31 DIAGNOSIS — M25.552 BILATERAL HIP PAIN: Primary | ICD-10-CM

## 2023-10-31 DIAGNOSIS — M25.551 BILATERAL HIP PAIN: Primary | ICD-10-CM

## 2023-10-31 PROCEDURE — 97140 MANUAL THERAPY 1/> REGIONS: CPT | Mod: GP | Performed by: PHYSICAL THERAPIST

## 2023-10-31 PROCEDURE — 97110 THERAPEUTIC EXERCISES: CPT | Mod: GP | Performed by: PHYSICAL THERAPIST

## 2023-10-31 NOTE — PROGRESS NOTES
[facility]                                                                                   OUTPATIENT PHYSICAL THERAPY    PLAN OF TREATMENT FOR OUTPATIENT REHABILITATION   Patient's Last Name, First Name, Debbie Fontaine YOB: 1955   Provider's Name   [facility]   Medical Record No.  [id number]     Onset Date: 08/08/23 (MD order)  Start of Care Date: 08/10/23     Medical Diagnosis:  bilateral hip pain      PT Treatment Diagnosis:  Bilateral hip impingement Plan of Treatment  Frequency/Duration: 1x per week 4 weeks, 2x per month 2 months/ 84 days    Certification date from 10/31/23 to 01/19/24         See note for plan of treatment details and functional goals     Gwen Srivastava, PT                         I CERTIFY THE NEED FOR THESE SERVICES FURNISHED UNDER        THIS PLAN OF TREATMENT AND WHILE UNDER MY CARE     (Physician attestation of this document indicates review and certification of the therapy plan).                Referring Provider:  iA De La Paz      Initial Assessment  See Epic Evaluation- Start of Care Date: 08/10/23       10/31/23 0500   Appointment Info   Signing clinician's name / credentials Gwen Anguiano, DPT, OCS   Total/Authorized Visits 6 per PT   Visits Used 5   Medical Diagnosis bilateral hip pain   PT Tx Diagnosis Bilateral hip impingement   Quick Adds Certification   Progress Note/Certification   Start of Care Date 08/10/23   Onset of illness/injury or Date of Surgery 08/08/23  (MD order)   Therapy Frequency 1x per week 4 weeks, 2x per month 2 months   Predicted Duration 84 days   Certification date from 10/31/23   Certification date to 01/19/24   PT Goal 1   Goal Identifier Squatting   Goal Description Patient will perform deep sumo squat with proper form with 1/10 pain   Rationale to maximize safety and independence within the home;to maximize safety and independence with  performance of ADLs and functional tasks   Target Date 01/19/24   Subjective Report   Subjective Report Debbie states her hips are doing well. She continues to incorporate exercises into her routine and yoga. Less pain with deep squatting   Objective Measures   Objective Measures Objective Measure 2   Objective Measure 1   Objective Measure Hip ROM   Details Symmetrical measurements, pain with IR on R not on L, no pain with full flexion   Objective Measure 2   Objective Measure Palpation   Details Restrictions through the adductors.   Treatment Interventions (PT)   Interventions Therapeutic Procedure/Exercise;Manual Therapy   Therapeutic Procedure/Exercise   Therapeutic Procedures: strength, endurance, ROM, flexibillity minutes (52123) 15   Ther Proc 1 Bridge with roll out   Ther Proc 1 - Details Verbally reviewed in clinic. Continue HEP   PTRx Ther Proc 1 Bridging #1   PTRx Ther Proc 1 - Details Verbally reviewed in clinic. Continue HEP   PTRx Ther Proc 2 Clamshell Feet together   PTRx Ther Proc 2 - Details 1x15 each side. VC to keep hips rolled forward   PTRx Ther Proc 3 Standing Plie Squat    PTRx Ther Proc 3 - Details 1x15. VC to keep feet in ER   PTRx Ther Proc 4 Hip AROM Standing Abduction   PTRx Ther Proc 4 - Details verbally reviewed in clinic. Continue HEP   PTRx Ther Proc 5 Pretzel stretch   PTRx Ther Proc 5 - Details 2x30 seconds each side. VC to feel stretch, not strain   Skilled Intervention Increase glute engagement to offload anterior structrures of hip   Manual Therapy   Manual Therapy: Mobilization, MFR, MLD, friction massage minutes (83890) 15   Manual Therapy 1 Bilateral hip belted mobilizations   Manual Therapy 1 - Details Grade III-IV oscillations and holds inferior and lateral   Skilled Intervention To increase mobility of hips and decrease sensitivity of surrounding hip musculature   Patient Response/Progress Less pain with IR hip ROM.   Total Session Time   Timed Code Treatment Minutes 30    Total Treatment Time (sum of timed and untimed services) 30

## 2023-11-10 ENCOUNTER — THERAPY VISIT (OUTPATIENT)
Dept: PHYSICAL THERAPY | Facility: CLINIC | Age: 68
End: 2023-11-10
Payer: COMMERCIAL

## 2023-11-10 DIAGNOSIS — M25.551 BILATERAL HIP PAIN: Primary | ICD-10-CM

## 2023-11-10 DIAGNOSIS — M25.552 BILATERAL HIP PAIN: Primary | ICD-10-CM

## 2023-11-10 PROCEDURE — 97110 THERAPEUTIC EXERCISES: CPT | Mod: GP | Performed by: PHYSICAL THERAPIST

## 2023-11-10 PROCEDURE — 97140 MANUAL THERAPY 1/> REGIONS: CPT | Mod: GP | Performed by: PHYSICAL THERAPIST

## 2023-11-10 NOTE — PROGRESS NOTES
DISCHARGE  Reason for Discharge: Patient has met all goals.    Equipment Issued: HEP    Discharge Plan: Patient to continue home program.    Referring Provider:  Ai De La Paz       11/10/23 0500   Appointment Info   Signing clinician's name / credentials Gwen Anguiano, DPT, OCS   Total/Authorized Visits 6 per PT   Visits Used 6   Medical Diagnosis bilateral hip pain   PT Tx Diagnosis Bilateral hip impingement   Quick Adds Certification   Progress Note/Certification   Start of Care Date 08/10/23   Onset of illness/injury or Date of Surgery 08/08/23  (MD order)   Therapy Frequency 1x per week 4 weeks, 2x per month 2 months   Predicted Duration 84 days   Certification date from 10/31/23   Certification date to 01/19/24   PT Goal 1   Goal Identifier Squatting   Goal Description Patient will perform deep sumo squat with proper form with 1/10 pain   Rationale to maximize safety and independence within the home;to maximize safety and independence with performance of ADLs and functional tasks   Target Date 01/19/24   Date Met 11/10/23   Subjective Report   Subjective Report Debbie states that her hips are doing well. She does still have some discomfort in certain positions, but is opting to trial different positions to offload hip pain. Overall feels her hips are doing well. She feels ready to discharge from PT with use of HEP   Objective Measures   Objective Measures Objective Measure 2   Objective Measure 1   Objective Measure Hip ROM   Details Symmetrical measurements, pain with IR on R not on L, no pain with full flexion   Objective Measure 2   Objective Measure Palpation   Details Restrictions through the adductors.   Treatment Interventions (PT)   Interventions Therapeutic Procedure/Exercise;Manual Therapy   Therapeutic Procedure/Exercise   Therapeutic Procedures: strength, endurance, ROM, flexibillity minutes (12463) 5   Ther Proc 1 Bridge with roll out   Ther Proc 1 - Details Verbally reviewed in clinic.  Continue HEP   PTRx Ther Proc 1 Bridging #1   PTRx Ther Proc 1 - Details Verbally reviewed in clinic. Continue HEP   PTRx Ther Proc 2 Clamshell Feet together   PTRx Ther Proc 2 - Details 1x15 each side. VC to keep hips rolled forward   PTRx Ther Proc 3 Standing Plie Squat    PTRx Ther Proc 3 - Details 1x15. VC to keep feet in ER   PTRx Ther Proc 4 Hip AROM Standing Abduction   PTRx Ther Proc 4 - Details verbally reviewed in clinic. Continue HEP   PTRx Ther Proc 5 Pretzel stretch   PTRx Ther Proc 5 - Details 2x30 seconds each side. VC to feel stretch, not strain   Skilled Intervention Increase glute engagement to offload anterior structrures of hip   Manual Therapy   Manual Therapy: Mobilization, MFR, MLD, friction massage minutes (52271) 20   Manual Therapy 1 Bilateral hip belted mobilizations   Manual Therapy 1 - Details Grade III-IV oscillations and holds inferior and lateral   Skilled Intervention To increase mobility of hips and decrease sensitivity of surrounding hip musculature   Patient Response/Progress Less pain with IR hip ROM.

## 2023-11-29 ENCOUNTER — TELEPHONE (OUTPATIENT)
Dept: GASTROENTEROLOGY | Facility: CLINIC | Age: 68
End: 2023-11-29
Payer: COMMERCIAL

## 2023-11-29 NOTE — TELEPHONE ENCOUNTER
Pre assessment completed for upcoming procedure.    Procedure details:    Arrival time and facility location reviewed.    Pre op exam needed? N/A    Designated  policy reviewed. Instructed to have someone stay 6 hours post procedure.     COVID policy reviewed.      Medication review:    Medications reviewed. Please see supporting documentation below. Holding recommendations discussed (if applicable).   NSAID medication(s): Ibuprofen (Advil, Motrin): HOLD 1 day before procedure.      Prep for procedure:     Reviewed procedure prep instructions.     Patient verbalized understanding and had no questions or concerns at this time.        Nely Moreno RN  Endoscopy Procedure Pre Assessment RN  [phone] option 4

## 2023-11-29 NOTE — TELEPHONE ENCOUNTER
Pre visit planning completed.      Procedure details:    Patient scheduled for Colonoscopy  on 12.13.23.     Arrival time: 1415. Procedure time 1515    Pre op exam needed? N/A    Facility location: [facility]; [address] [place] [address], [address] Floor, [place] [address]    Sedation type: Conscious sedation     Indication for procedure:   Special screening for malignant neoplasms, colon            Chart review:     Electronic implanted devices? No    Recent diagnosis of diverticulitis within the last 6 weeks? No    Diabetic? No      Medication review:    Anticoagulants? No    NSAIDS? No NSAID medications per patient's medication list.  RN will verify with pre-assessment call.    Other medication HOLDING recommendations:  N/A      Prep for procedure:     Bowel prep recommendation: Standard Miralax   Due to:  standard bowel prep.    Prep instructions sent via [employer]       Nely Moreno RN  Endoscopy Procedure Pre Assessment RN  [phone] option 4

## 2023-12-11 NOTE — TELEPHONE ENCOUNTER
New arrival time 0745 due to provider being out of office.     Reviewed prep instructions with new arrival time. Patient had no further questions at this time.     Ai Hernandez RN  Endoscopy Procedure Pre Assessment RN  [phone] option 4

## 2023-12-13 ENCOUNTER — HOSPITAL ENCOUNTER (OUTPATIENT)
Facility: CLINIC | Age: 68
Discharge: HOME | End: 2023-12-13
Attending: INTERNAL MEDICINE | Admitting: INTERNAL MEDICINE
Payer: COMMERCIAL

## 2023-12-13 VITALS
SYSTOLIC BLOOD PRESSURE: 131 MMHG | RESPIRATION RATE: 26 BRPM | HEART RATE: 52 BPM | DIASTOLIC BLOOD PRESSURE: 72 MMHG | OXYGEN SATURATION: 95 %

## 2023-12-13 LAB — COLONOSCOPY: NORMAL

## 2023-12-13 PROCEDURE — 250N000011 HC RX IP 250 OP 636: Performed by: INTERNAL MEDICINE

## 2023-12-13 PROCEDURE — G0121 COLON CA SCRN NOT HI RSK IND: HCPCS | Performed by: INTERNAL MEDICINE

## 2023-12-13 PROCEDURE — G0500 MOD SEDAT ENDO SERVICE >5YRS: HCPCS | Performed by: INTERNAL MEDICINE

## 2023-12-13 PROCEDURE — 45378 DIAGNOSTIC COLONOSCOPY: CPT | Performed by: INTERNAL MEDICINE

## 2023-12-13 PROCEDURE — 99153 MOD SED SAME PHYS/QHP EA: CPT | Performed by: INTERNAL MEDICINE

## 2023-12-13 RX ORDER — LIDOCAINE 40 MG/G
CREAM TOPICAL
Status: DISCONTINUED | OUTPATIENT
Start: 2023-12-13 | End: 2023-12-13 | Stop reason: HOSPADM

## 2023-12-13 RX ORDER — FENTANYL CITRATE 50 UG/ML
INJECTION, SOLUTION INTRAMUSCULAR; INTRAVENOUS PRN
Status: DISCONTINUED | OUTPATIENT
Start: 2023-12-13 | End: 2023-12-13 | Stop reason: HOSPADM

## 2023-12-13 RX ORDER — ONDANSETRON 2 MG/ML
4 INJECTION INTRAMUSCULAR; INTRAVENOUS
Status: DISCONTINUED | OUTPATIENT
Start: 2023-12-13 | End: 2023-12-13 | Stop reason: HOSPADM

## 2023-12-13 ASSESSMENT — ACTIVITIES OF DAILY LIVING (ADL): ADLS_ACUITY_SCORE: 35

## 2023-12-13 NOTE — H&P
[facility] Anesthesia Pre-op History and Physical    Debbie Richardson MRN# [id number]   Age: 68 year old YOB: 1955      Date of Surgery: 12/13/2023 [facility]      Date of Exam 12/13/2023 Facility (Same day)       Home clinic: unknown  Primary care provider: Ai De La Paz         Chief Complaint and/or Reason for Procedure:   No chief complaint on file.           Active problem list:     Patient Active Problem List    Diagnosis Date Noted    Urinary urgency 03/19/2014     Priority: Medium    Urgency incontinence 03/19/2014     Priority: Medium    Cystocele, midline 03/19/2014     Priority: Medium    Rectocele 03/19/2014     Priority: Medium    Vaginal atrophy 01/07/2014     Priority: Medium     Per provider        Advanced directives, counseling/discussion 10/04/2012     Priority: Medium     Discussed advance care planning with patient; information given to patient to review. 10/4/2012         GERD (gastroesophageal reflux disease) 09/27/2012     Priority: Medium    Female genital symptoms 11/29/2008     Priority: Medium     Stage II utero-vaginal prolapse  Problem list name updated by automated process. Provider to review      IBS (irritable bowel syndrome)      Priority: Medium    Insomnia      Priority: Medium     Problem list name updated by automated process. Provider to review              Medications (include herbals and vitamins):   Any Plavix use in the last 7 days? No     No current facility-administered medications for this encounter.             Allergies:      Allergies   Allergen Reactions    Food      Strong sensitivity to Corn and mild sensitivity to gluten     Nkda [No Known Drug Allergy]     Metal [Milan] Rash    Nickel Rash     Allergy to Latex?  No  Allergy to tape?    No  Intolerances: None            Physical Exam:   All vitals have been reviewed  Patient Vitals for the past 8 hrs:   BP Pulse Resp SpO2    12/13/23 0815 121/77 62 16 98 %     No intake/output data recorded.  Airway assessment:   Patient is able to open mouth wide  Patient is able to stick out tongue}              Lab / Radiology Results:     Lab Results   Component Value Date    WBC 6.7 03/05/2013    RBC 4.40 03/05/2013    HGB 13.0 03/05/2013    HCT 39.6 03/05/2013    MCV 90 03/05/2013    RDW 12.7 03/05/2013     03/05/2013             Anesthetic risk and/or ASA classification:   ASA 1    Bill Almodovar MD

## 2023-12-20 ENCOUNTER — MYC MEDICAL ADVICE (OUTPATIENT)
Dept: FAMILY MEDICINE | Facility: CLINIC | Age: 68
End: 2023-12-20
Payer: COMMERCIAL

## 2023-12-20 DIAGNOSIS — N63.21 MASS OF UPPER OUTER QUADRANT OF LEFT BREAST: ICD-10-CM

## 2023-12-20 DIAGNOSIS — N63.20 MASS OF LEFT BREAST, UNSPECIFIED QUADRANT: Primary | ICD-10-CM

## 2023-12-20 NOTE — TELEPHONE ENCOUNTER
"Unique -- Sounds like patient needs a diagnostic mammo? US? Do you need a visit?    \"My  is concerned about a lump he felt in my left breast. I told him I'd see if I could get a breast exam before we left for [place] on Dec. 29th. Is this possible?\"    Tram Call RN  [facility]    "

## 2023-12-22 NOTE — TELEPHONE ENCOUNTER
Patient calls back stating it is like the mid breast or perhaps upper and outer quadrant.  Chayo Grant RN  [facility]

## 2023-12-22 NOTE — TELEPHONE ENCOUNTER
Left message to call back and ask to speak with an available triage nurse.  Cecille PADILLA RN  [facility]

## 2023-12-22 NOTE — TELEPHONE ENCOUNTER
It looks like in order for me to sign the order, Medicare requires it linked to a more specific diagnosis.  I need to know what quadrant of the breast.

## 2023-12-28 ENCOUNTER — HOSPITAL ENCOUNTER (OUTPATIENT)
Dept: MAMMOGRAPHY | Facility: CLINIC | Age: 68
Discharge: HOME | End: 2023-12-28
Attending: NURSE PRACTITIONER
Payer: COMMERCIAL

## 2023-12-28 DIAGNOSIS — N63.21 MASS OF UPPER OUTER QUADRANT OF LEFT BREAST: ICD-10-CM

## 2023-12-28 PROCEDURE — 76642 ULTRASOUND BREAST LIMITED: CPT | Mod: LT

## 2023-12-28 PROCEDURE — 77062 BREAST TOMOSYNTHESIS BI: CPT

## 2024-04-20 ENCOUNTER — MYC MEDICAL ADVICE (OUTPATIENT)
Dept: FAMILY MEDICINE | Facility: CLINIC | Age: 69
End: 2024-04-20
Payer: COMMERCIAL

## 2024-04-22 ENCOUNTER — NURSE TRIAGE (OUTPATIENT)
Dept: FAMILY MEDICINE | Facility: CLINIC | Age: 69
End: 2024-04-22
Payer: COMMERCIAL

## 2024-04-22 NOTE — TELEPHONE ENCOUNTER
"Nurse Triage SBAR    Is this a 2nd Level Triage? NO    Situation: Pt endorsed mild dull headache on back of head  upon waking on April 8th after trying to take pictures of the eclipse wearing dark eye glasses. Pt report no headaches today.     Background: Pt had hx of debilitating migraines in her 20s but migraines resolved on its own after pt had children and has not had migraines since.     Assessment: Report currently not experiencing headaches today. Had one episode of seeing \"an eclipse like image\" while driving last week but no similar symptoms since.      Protocol Recommended Disposition:   Home Care    Pt will follow-up with Optometrist for floating image in eyes. Will call clinic if changes headaches or if symptoms becomes worst.     Recommendation:  None        Does the patient meet one of the following criteria for ADS visit consideration? No       Reason for Disposition   Mild-moderate headache    Additional Information   Negative: Difficult to awaken or acting confused (e.g., disoriented, slurred speech)   Negative: Weakness of the face, arm or leg on one side of the body and new-onset   Negative: Numbness of the face, arm or leg on one side of the body and new-onset   Negative: Loss of speech or garbled speech and new-onset   Negative: Passed out (i.e., fainted, collapsed and was not responding)   Negative: Sounds like a life-threatening emergency to the triager   Negative: Followed a head injury within last 3 days   Negative: Traumatic Brain Injury (TBI) is suspected   Negative: Sinus pain of forehead and yellow or green nasal discharge   Negative: Pregnant   Negative: SEVERE headache, states 'worst headache' of life   Negative: SEVERE headache, sudden-onset (i.e., reaching maximum intensity within seconds to 1 hour)   Negative: Severe pain in one eye   Negative: Loss of vision or double vision  (Exception: Same as prior migraines.)   Negative: Patient sounds very sick or weak to the triager   " "Negative: Fever > 103 F (39.4 C)   Negative: Fever > 100.0 F (37.8 C) and has diabetes mellitus or a weak immune system (e.g., HIV positive, cancer chemotherapy, organ transplant, splenectomy, chronic steroids)   Negative: SEVERE headache (e.g., excruciating) and has had severe headaches before   Negative: SEVERE headache and not relieved by pain meds   Negative: SEVERE headache and vomiting   Negative: SEVERE headache and fever   Negative: New headache and weak immune system (e.g., HIV positive, cancer chemo, splenectomy, organ transplant, chronic steroids)   Negative: Fever present > 3 days (72 hours)   Negative: Patient wants to be seen   Negative: Unexplained headache that is present > 24 hours   Negative: New headache and age > 50   Negative: Headache started during exertion (e.g., sex, strenuous exercise, heavy lifting)   Negative: Headache is a chronic symptom (recurrent or ongoing AND lasting > 4 weeks)    Answer Assessment - Initial Assessment Questions  1. LOCATION: \"Where does it hurt?\"       Slight dual ache on the back of head    2. ONSET: \"When did the headache start?\" (Minutes, hours or days)       During   3. PATTERN: \"Does the pain come and go, or has it been constant since it started?\"      Started on  April 8th,  during the eclips, I was wearing dark glasses trying to take a picture of the eclips    4. SEVERITY: \"How bad is the pain?\" and \"What does it keep you from doing?\"  (e.g., Scale 1-10; mild, moderate, or severe)    - MILD (1-3): doesn't interfere with normal activities     - MODERATE (4-7): interferes with normal activities or awakens from sleep     - SEVERE (8-10): excruciating pain, unable to do any normal activities         Don't have the headache today, but when experiencing mild dual ache    5. RECURRENT SYMPTOM: \"Have you ever had headaches before?\" If Yes, ask: \"When was the last time?\" and \"What happened that time?\"       Hx migraine in mid 20s but has not had migraine since. " "Migraine went away after two kids.    6. CAUSE: \"What do you think is causing the headache?\"      I don't know, but the headache came on the day    7. MIGRAINE: \"Have you been diagnosed with migraine headaches?\" If Yes, ask: \"Is this headache similar?\"      Yes, I have a hx of migraine, but this is different    8. HEAD INJURY: \"Has there been any recent injury to the head?\"       None    9. OTHER SYMPTOMS: \"Do you have any other symptoms?\" (fever, stiff neck, eye pain, sore throat, cold symptoms)      No    10. PREGNANCY: \"Is there any chance you are pregnant?\" \"When was your last menstrual period?\"        No    Protocols used: Headache-A-OH    "

## 2024-04-22 NOTE — TELEPHONE ENCOUNTER
Writer replied to patient via [employer].  DU SanchezN, RN (she/her)  [facility] RN

## 2024-07-03 ENCOUNTER — OFFICE VISIT (OUTPATIENT)
Dept: OPTOMETRY | Facility: CLINIC | Age: 69
End: 2024-07-03
Payer: COMMERCIAL

## 2024-07-03 DIAGNOSIS — H52.13 MYOPIA OF BOTH EYES WITH ASTIGMATISM: Primary | ICD-10-CM

## 2024-07-03 DIAGNOSIS — H52.4 PRESBYOPIA: ICD-10-CM

## 2024-07-03 DIAGNOSIS — H04.129 DRY EYE: ICD-10-CM

## 2024-07-03 DIAGNOSIS — H52.203 MYOPIA OF BOTH EYES WITH ASTIGMATISM: Primary | ICD-10-CM

## 2024-07-03 PROCEDURE — 92015 DETERMINE REFRACTIVE STATE: CPT | Performed by: OPTOMETRIST

## 2024-07-03 PROCEDURE — 92014 COMPRE OPH EXAM EST PT 1/>: CPT | Performed by: OPTOMETRIST

## 2024-07-03 ASSESSMENT — REFRACTION_WEARINGRX
SPECS_TYPE: PAL
OS_ADD: +2.50
OD_AXIS: 012
OD_CYLINDER: +1.75
OD_ADD: +2.50
OS_AXIS: 160
OS_CYLINDER: +2.00
OS_SPHERE: -2.75
OD_SPHERE: -2.25

## 2024-07-03 ASSESSMENT — REFRACTION_MANIFEST
OD_CYLINDER: +1.75
OD_AXIS: 012
OS_CYLINDER: +0.75
OD_SPHERE: -2.50
OD_SPHERE: -2.00
OS_AXIS: 160
OS_SPHERE: -2.75
OS_CYLINDER: +2.00
OS_ADD: +2.50
OD_CYLINDER: +1.25
OD_AXIS: 180
OD_ADD: +2.50
OS_AXIS: 151
METHOD_AUTOREFRACTION: 1
OS_SPHERE: -1.00

## 2024-07-03 ASSESSMENT — VISUAL ACUITY
OS_CC: 20/20
METHOD: SNELLEN - LINEAR
OD_CC: 20/20
CORRECTION_TYPE: GLASSES
OS_CC: 20/30
OD_CC: 20/20
OS_CC+: -2

## 2024-07-03 ASSESSMENT — TONOMETRY
OS_IOP_MMHG: 17
OD_IOP_MMHG: 17
IOP_METHOD: APPLANATION

## 2024-07-03 ASSESSMENT — KERATOMETRY
OS_K2POWER_DIOPTERS: 43.87
OS_AXISANGLE2_DEGREES: 9
OD_AXISANGLE2_DEGREES: 95
OS_AXISANGLE_DEGREES: 99
OD_K1POWER_DIOPTERS: 43
OS_K1POWER_DIOPTERS: 42.87
OD_K2POWER_DIOPTERS: 43.87
OD_AXISANGLE_DEGREES: 5

## 2024-07-03 ASSESSMENT — CONF VISUAL FIELD
OD_INFERIOR_TEMPORAL_RESTRICTION: 0
OS_INFERIOR_NASAL_RESTRICTION: 0
OD_INFERIOR_NASAL_RESTRICTION: 0
METHOD: COUNTING FINGERS
OS_SUPERIOR_NASAL_RESTRICTION: 0
OS_SUPERIOR_TEMPORAL_RESTRICTION: 0
OS_NORMAL: 1
OD_SUPERIOR_TEMPORAL_RESTRICTION: 0
OS_INFERIOR_TEMPORAL_RESTRICTION: 0
OD_NORMAL: 1
OD_SUPERIOR_NASAL_RESTRICTION: 0

## 2024-07-03 ASSESSMENT — EXTERNAL EXAM - LEFT EYE: OS_EXAM: NORMAL

## 2024-07-03 ASSESSMENT — CUP TO DISC RATIO
OS_RATIO: 0.15
OD_RATIO: 0.1

## 2024-07-03 ASSESSMENT — SLIT LAMP EXAM - LIDS: COMMENTS: MEIBOMIAN GLAND DYSFUNCTION

## 2024-07-03 ASSESSMENT — EXTERNAL EXAM - RIGHT EYE: OD_EXAM: NORMAL

## 2024-07-03 NOTE — LETTER
7/3/2024      Debbie Richardson  [address]  [place] [address]      Dear Colleague,    [facility] you [facility], [facility]bbie Richardson[facility]. Please see a copy of my visit note below.    Chief Complaint   Patient presents with     Annual Eye Exam        Last Eye Exam: 04/2022  Dilated Previously: Yes, side effects of dilation explained today    What are you currently using to see?  glasses       Distance Vision Acuity: Noticed gradual change in both eyes in glasses     Near Vision Acuity: Satisfied with vision while reading and using computer with glasses    Eye Comfort: dry  Do you use eye drops? : Yes: PF drops 1-2 times a day       Eva Morton - Optometric Assistant           Medical, surgical and family histories reviewed and updated 7/3/2024.       OBJECTIVE: See Ophthalmology exam    ASSESSMENT:    ICD-10-CM    1. Myopia of both eyes with astigmatism  H52.13 EYE EXAM (SIMPLE-NONBILLABLE)    H52.203 REFRACTION      2. Presbyopia  H52.4 EYE EXAM (SIMPLE-NONBILLABLE)     REFRACTION      3. Dry eye  H04.129       No solar maculopathy  Slight change R only , no glasses change needed   PLAN:   Ongoing artificial tears  as needed ,   Lara Navarrete OD     Again, thank you for allowing me to participate in the care of your patient.        Sincerely,        Lara Navarrete, OD

## 2024-07-03 NOTE — PROGRESS NOTES
Chief Complaint   Patient presents with    Annual Eye Exam        Last Eye Exam: 04/2022  Dilated Previously: Yes, side effects of dilation explained today    What are you currently using to see?  glasses       Distance Vision Acuity: Noticed gradual change in both eyes in glasses     Near Vision Acuity: Satisfied with vision while reading and using computer with glasses    Eye Comfort: dry  Do you use eye drops? : Yes: PF drops 1-2 times a day       Eva Morton - Optometric Assistant           Medical, surgical and family histories reviewed and updated 7/3/2024.       OBJECTIVE: See Ophthalmology exam    ASSESSMENT:    ICD-10-CM    1. Myopia of both eyes with astigmatism  H52.13 EYE EXAM (SIMPLE-NONBILLABLE)    H52.203 REFRACTION      2. Presbyopia  H52.4 EYE EXAM (SIMPLE-NONBILLABLE)     REFRACTION      3. Dry eye  H04.129       No solar maculopathy  Slight change R only , no glasses change needed   PLAN:   Ongoing artificial tears  as needed ,   Lara Navarrete OD

## 2024-07-18 ENCOUNTER — TRANSFERRED RECORDS (OUTPATIENT)
Dept: HEALTH INFORMATION MANAGEMENT | Facility: CLINIC | Age: 69
End: 2024-07-18
Payer: COMMERCIAL

## 2024-08-10 SDOH — HEALTH STABILITY: PHYSICAL HEALTH: ON AVERAGE, HOW MANY MINUTES DO YOU ENGAGE IN EXERCISE AT THIS LEVEL?: 30 MIN

## 2024-08-10 SDOH — HEALTH STABILITY: PHYSICAL HEALTH: ON AVERAGE, HOW MANY DAYS PER WEEK DO YOU ENGAGE IN MODERATE TO STRENUOUS EXERCISE (LIKE A BRISK WALK)?: 2 DAYS

## 2024-08-10 ASSESSMENT — SOCIAL DETERMINANTS OF HEALTH (SDOH): HOW OFTEN DO YOU GET TOGETHER WITH FRIENDS OR RELATIVES?: TWICE A WEEK

## 2024-08-14 ENCOUNTER — OFFICE VISIT (OUTPATIENT)
Dept: FAMILY MEDICINE | Facility: CLINIC | Age: 69
End: 2024-08-14
Attending: NURSE PRACTITIONER
Payer: COMMERCIAL

## 2024-08-14 VITALS
HEART RATE: 65 BPM | BODY MASS INDEX: 26.6 KG/M2 | HEIGHT: 68 IN | OXYGEN SATURATION: 98 % | WEIGHT: 175.5 LBS | TEMPERATURE: 97 F | RESPIRATION RATE: 18 BRPM | SYSTOLIC BLOOD PRESSURE: 101 MMHG | DIASTOLIC BLOOD PRESSURE: 68 MMHG

## 2024-08-14 DIAGNOSIS — N81.9 FEMALE GENITAL PROLAPSE, UNSPECIFIED TYPE: ICD-10-CM

## 2024-08-14 DIAGNOSIS — Z00.00 ENCOUNTER FOR MEDICARE ANNUAL WELLNESS EXAM: Primary | ICD-10-CM

## 2024-08-14 DIAGNOSIS — Z78.0 ASYMPTOMATIC MENOPAUSAL STATE: ICD-10-CM

## 2024-08-14 DIAGNOSIS — E55.9 VITAMIN D DEFICIENCY: ICD-10-CM

## 2024-08-14 DIAGNOSIS — M54.50 BILATERAL LOW BACK PAIN WITHOUT SCIATICA, UNSPECIFIED CHRONICITY: ICD-10-CM

## 2024-08-14 DIAGNOSIS — Z13.220 SCREENING CHOLESTEROL LEVEL: ICD-10-CM

## 2024-08-14 DIAGNOSIS — F33.0 MILD EPISODE OF RECURRENT MAJOR DEPRESSIVE DISORDER (H): ICD-10-CM

## 2024-08-14 DIAGNOSIS — R26.89 BALANCE PROBLEMS: ICD-10-CM

## 2024-08-14 DIAGNOSIS — G47.00 INSOMNIA, UNSPECIFIED TYPE: ICD-10-CM

## 2024-08-14 DIAGNOSIS — Z13.1 SCREENING FOR DIABETES MELLITUS: ICD-10-CM

## 2024-08-14 DIAGNOSIS — N95.2 VAGINAL ATROPHY: ICD-10-CM

## 2024-08-14 LAB
ANION GAP SERPL CALCULATED.3IONS-SCNC: 9 MMOL/L (ref 7–15)
BUN SERPL-MCNC: 13.9 MG/DL (ref 8–23)
CALCIUM SERPL-MCNC: 9.6 MG/DL (ref 8.8–10.4)
CHLORIDE SERPL-SCNC: 103 MMOL/L (ref 98–107)
CHOLEST SERPL-MCNC: 206 MG/DL
CREAT SERPL-MCNC: 0.93 MG/DL (ref 0.51–0.95)
EGFRCR SERPLBLD CKD-EPI 2021: 67 ML/MIN/1.73M2
FASTING STATUS PATIENT QL REPORTED: YES
GLUCOSE SERPL-MCNC: 80 MG/DL (ref 70–99)
GLUCOSE SERPL-MCNC: 80 MG/DL (ref 70–99)
HCO3 SERPL-SCNC: 29 MMOL/L (ref 22–29)
HDLC SERPL-MCNC: 58 MG/DL
LDLC SERPL CALC-MCNC: 130 MG/DL
NONHDLC SERPL-MCNC: 148 MG/DL
POTASSIUM SERPL-SCNC: 4.2 MMOL/L (ref 3.4–5.3)
SODIUM SERPL-SCNC: 141 MMOL/L (ref 135–145)
TRIGL SERPL-MCNC: 88 MG/DL
VIT D+METAB SERPL-MCNC: 44 NG/ML (ref 20–50)

## 2024-08-14 PROCEDURE — G0439 PPPS, SUBSEQ VISIT: HCPCS | Performed by: NURSE PRACTITIONER

## 2024-08-14 PROCEDURE — 80048 BASIC METABOLIC PNL TOTAL CA: CPT | Performed by: NURSE PRACTITIONER

## 2024-08-14 PROCEDURE — 82306 VITAMIN D 25 HYDROXY: CPT | Performed by: NURSE PRACTITIONER

## 2024-08-14 PROCEDURE — 99214 OFFICE O/P EST MOD 30 MIN: CPT | Mod: 25 | Performed by: NURSE PRACTITIONER

## 2024-08-14 PROCEDURE — 36415 COLL VENOUS BLD VENIPUNCTURE: CPT | Performed by: NURSE PRACTITIONER

## 2024-08-14 PROCEDURE — 80061 LIPID PANEL: CPT | Performed by: NURSE PRACTITIONER

## 2024-08-14 RX ORDER — SERTRALINE HYDROCHLORIDE 100 MG/1
TABLET, FILM COATED ORAL
Qty: 135 TABLET | Refills: 4 | Status: SHIPPED | OUTPATIENT
Start: 2024-08-14

## 2024-08-14 RX ORDER — TRAZODONE HYDROCHLORIDE 100 MG/1
TABLET ORAL
Qty: 135 TABLET | Refills: 4 | Status: SHIPPED | OUTPATIENT
Start: 2024-08-14

## 2024-08-14 ASSESSMENT — PAIN SCALES - GENERAL: PAINLEVEL: MILD PAIN (2)

## 2024-08-14 ASSESSMENT — PATIENT HEALTH QUESTIONNAIRE - PHQ9
SUM OF ALL RESPONSES TO PHQ QUESTIONS 1-9: 2
10. IF YOU CHECKED OFF ANY PROBLEMS, HOW DIFFICULT HAVE THESE PROBLEMS MADE IT FOR YOU TO DO YOUR WORK, TAKE CARE OF THINGS AT HOME, OR GET ALONG WITH OTHER PEOPLE: NOT DIFFICULT AT ALL
SUM OF ALL RESPONSES TO PHQ QUESTIONS 1-9: 2

## 2024-08-14 NOTE — PROGRESS NOTES
"Preventive Care Visit  [facility]  Ai De La Paz, NP, Nurse Practitioner - Family  Aug 14, 2024      Assessment & Plan     (Z00.00) Encounter for Medicare annual wellness exam  (primary encounter diagnosis)  Comment:   Plan:       (F33.0) Mild episode of recurrent major depressive disorder (H24)  Comment: stable  Plan: sertraline (ZOLOFT) 100 MG tablet        The current medical regimen is effective;  continue present plan and medications.     (M54.50) Bilateral low back pain without sciatica, unspecified chronicity  Comment:   Plan: Physical Therapy  Referral        Will refer for PT.     (R26.89) Balance problems  Comment:   Plan: Physical Therapy  Referral        Will refer for PT.     (N81.9) Female genital prolapse, unspecified type  Comment:   Plan: Physical Therapy  Referral        Will refer for pelvic floor therapy.    (N95.2) Vaginal atrophy  Comment: stable  Plan: conjugated estrogens (PREMARIN) 0.625 MG/GM         vaginal cream        The current medical regimen is effective;  continue present plan and medications.     (G47.00) Insomnia, unspecified type  Comment: stable  Plan: traZODone (DESYREL) 100 MG tablet        The current medical regimen is effective;  continue present plan and medications.     (Z78.0) Asymptomatic menopausal state  Comment:   Plan: DX Bone Density            (Z13.1) Screening for diabetes mellitus  Comment:   Plan: Glucose            (Z13.220) Screening cholesterol level  Comment:   Plan: Lipid panel reflex to direct LDL Non-fasting            (E55.9) Vitamin D deficiency  Comment:   Plan: Vitamin D Deficiency                    BMI  Estimated body mass index is 26.6 kg/m  as calculated from the following:    Height as of this encounter: 1.73 m (5' 8.11\").    Weight as of this encounter: 79.6 kg (175 lb 8 oz).       Counseling  Appropriate preventive services were addressed with this patient via screening, " questionnaire, or discussion as appropriate for fall prevention, nutrition, physical activity, Tobacco-use cessation, weight loss and cognition.  Checklist reviewing preventive services available has been given to the patient.  Reviewed patient's diet, addressing concerns and/or questions.   She is at risk for lack of exercise and has been provided with information to increase physical activity for the benefit of her well-being.   She is at risk for psychosocial distress and has been provided with information to reduce risk.   Discussed possible causes of fatigue. Patient reported safety concerns were addressed today.Information on urinary incontinence and treatment options given to patient.           Vanessa Lewis is a 68 year old, presenting for the following:  Annual Visit        8/14/2024    10:19 AM   Additional Questions   Roomed by Gracia ENRIQUEZ   Accompanied by self         Health Care Directive  Patient does not have a Health Care Directive or Living Will: Discussed advance care planning with patient; information given to patient to review.    HPI  She has been having a recurrence of low back pain.  She has done PT in the past, but hasn't been doing her home exercises.  She denies any significant radicular pain, paresthesias or weakness.    Intermittent feeling of off-balance.    Failed vaginal mesh surgery.  She did do pelvic floor PT in 2014.  She is noticing some incontinence with picking up her granddaughter and does have have urgency.    Mood has been stable on Sertraline.  She is sleeping well with Trazodone.             8/10/2024   General Health   How would you rate your overall physical health? Good   Feel stress (tense, anxious, or unable to sleep) Only a little      (!) STRESS CONCERN      8/10/2024   Nutrition   Diet: Gluten-free/reduced            8/10/2024   Exercise   Days per week of moderate/strenous exercise 2 days   Average minutes spent exercising at this level 30 min      (!) EXERCISE  CONCERN      8/10/2024   Social Factors   Frequency of gathering with friends or relatives Twice a week   Worry food won't last until get money to buy more No   Food not last or not have enough money for food? No   Do you have housing? (Housing is defined as stable permanent housing and does not include staying ouside in a car, in a tent, in an abandoned building, in an overnight shelter, or couch-surfing.) Yes   Are you worried about losing your housing? No   Lack of transportation? No   Unable to get utilities (heat,electricity)? No            8/14/2024   Fall Risk   Gait Speed Test (Document in seconds) 3.29   Gait Speed Test Interpretation Less than or equal to 5.00 seconds - PASS             8/10/2024   Activities of Daily Living- Home Safety   Needs help with the following daily activites None of the above   Safety concerns in the home Throw rugs in the hallway            8/10/2024   Dental   Dentist two times every year? Yes            8/10/2024   Hearing Screening   Hearing concerns? None of the above            8/10/2024   Driving Risk Screening   Patient/family members have concerns about driving No            8/10/2024   General Alertness/Fatigue Screening   Have you been more tired than usual lately? (!) YES            8/10/2024   Urinary Incontinence Screening   Bothered by leaking urine in past 6 months Yes            8/10/2024   TB Screening   Were you born outside of the [place]? No          Today's PHQ-9 Score:       8/14/2024    10:18 AM   PHQ-9 SCORE   PHQ-9 Total Score MyChart 2 (Minimal depression)   PHQ-9 Total Score 2         8/10/2024   Substance Use   Alcohol more than 3/day or more than 7/wk No   Do you have a current opioid prescription? No   How severe/bad is pain from 1 to 10? 3/10   Do you use any other substances recreationally? No        Social History     Tobacco Use    Smoking status: Never    Smokeless tobacco: Never   Vaping Use    Vaping status: Never Used   Substance Use Topics     Alcohol use: Yes     Comment: 1-2 glasses of wine several times per week    Drug use: No           12/28/2023   LAST FHS-7 RESULTS   1st degree relative breast or ovarian cancer No   Any relative bilateral breast cancer No   Any male have breast cancer No   Any ONE woman have BOTH breast AND ovarian cancer No   Any woman with breast cancer before 50yrs No   2 or more relatives with breast AND/OR ovarian cancer No   2 or more relatives with breast AND/OR bowel cancer No                 History of abnormal Pap smear:         8/8/2008    12:00 AM   PAP / HPV   PAP (Historical) NIL      ASCVD Risk   The 10-year ASCVD risk score (Marlene GHOTRA, et al., 2019) is: 4%    Values used to calculate the score:      Age: 68 years      Sex: Female      Is Non- : No      Diabetic: No      Tobacco smoker: No      Systolic Blood Pressure: 101 mmHg      Is BP treated: No      HDL Cholesterol: 63 mg/dL      Total Cholesterol: 181 mg/dL            Reviewed and updated as needed this visit by Provider                      Current providers sharing in care for this patient include:  Patient Care Team:  Ai De La Paz NP as PCP - General (Nurse Practitioner - Family)  Ai De La Paz NP as Assigned PCP  Lara Navarrete OD as Assigned Surgical Provider    The following health maintenance items are reviewed in Epic and correct as of today:  Health Maintenance   Topic Date Due    RSV VACCINE (Pregnancy & 60+) (1 - 1-dose 60+ series) Never done    ANNUAL REVIEW OF HM ORDERS  12/11/2023    COVID-19 Vaccine (9 - 2023-24 season) 02/26/2024    MEDICARE ANNUAL WELLNESS VISIT  08/08/2024    INFLUENZA VACCINE (1) 09/01/2024    PHQ-9  02/14/2025    FALL RISK ASSESSMENT  08/14/2025    GLUCOSE  12/13/2025    MAMMO SCREENING  12/28/2025    LIPID  05/02/2027    ADVANCE CARE PLANNING  08/08/2028    COLORECTAL CANCER SCREENING  12/13/2030    DTAP/TDAP/TD IMMUNIZATION (4 - Td or Tdap) 06/30/2032    DEXA   "07/05/2037    HEPATITIS C SCREENING  Completed    DEPRESSION ACTION PLAN  Completed    Pneumococcal Vaccine: 65+ Years  Completed    ZOSTER IMMUNIZATION  Completed    IPV IMMUNIZATION  Aged Out    HPV IMMUNIZATION  Aged Out    MENINGITIS IMMUNIZATION  Aged Out    RSV MONOCLONAL ANTIBODY  Aged Out    PAP  Discontinued            Objective    Exam  /68 (BP Location: Right arm, Patient Position: Sitting, Cuff Size: Adult Regular)   Pulse 65   Temp 97  F (36.1  C) (Temporal)   Resp 18   Ht 1.73 m (5' 8.11\")   Wt 79.6 kg (175 lb 8 oz)   LMP 07/13/2008   SpO2 98%   BMI 26.60 kg/m     Estimated body mass index is 26.6 kg/m  as calculated from the following:    Height as of this encounter: 1.73 m (5' 8.11\").    Weight as of this encounter: 79.6 kg (175 lb 8 oz).    Physical Exam  GENERAL: alert and no distress  EYES: Eyes grossly normal to inspection, PERRL and conjunctivae and sclerae normal  HENT: ear canals and TM's normal, nose and mouth without ulcers or lesions  NECK: no adenopathy, no asymmetry, masses, or scars  RESP: lungs clear to auscultation - no rales, rhonchi or wheezes  CV: regular rate and rhythm, normal S1 S2, no S3 or S4, no murmur, click or rub, no peripheral edema  ABDOMEN: soft, nontender, no hepatosplenomegaly, no masses and bowel sounds normal  MS: no gross musculoskeletal defects noted, no edema  SKIN: no suspicious lesions or rashes  NEURO: Normal strength and tone, mentation intact and speech normal  PSYCH: mentation appears normal, affect normal/bright         8/14/2024   Mini Cog   Clock Draw Score 2 Normal   3 Item Recall 3 objects recalled   Mini Cog Total Score 5                 Signed Electronically by: Ai De La Paz NP    Answers submitted by the patient for this visit:  Patient Health Questionnaire (Submitted on 8/14/2024)  If you checked off any problems, how difficult have these problems made it for you to do your work, take care of things at home, or get along with " other people?: Not difficult at all  PHQ9 TOTAL SCORE: 2

## 2024-08-14 NOTE — PATIENT INSTRUCTIONS
Patient Education   Preventive Care Advice   This is general advice given by our system to help you stay healthy. However, your care team may have specific advice just for you. Please talk to your care team about your preventive care needs.  Nutrition  Eat 5 or more servings of fruits and vegetables each day.  Try wheat bread, brown rice and whole grain pasta (instead of white bread, rice, and pasta).  Get enough calcium and vitamin D. Check the label on foods and aim for 100% of the RDA (recommended daily allowance).  Lifestyle  Exercise at least 150 minutes each week  (30 minutes a day, 5 days a week).  Do muscle strengthening activities 2 days a week. These help control your weight and prevent disease.  No smoking.  Wear sunscreen to prevent skin cancer.  Have a dental exam and cleaning every 6 months.  Yearly exams  See your health care team every year to talk about:  Any changes in your health.  Any medicines your care team has prescribed.  Preventive care, family planning, and ways to prevent chronic diseases.  Shots (vaccines)   HPV shots (up to age 26), if you've never had them before.  Hepatitis B shots (up to age 59), if you've never had them before.  COVID-19 shot: Get this shot when it's due.  Flu shot: Get a flu shot every year.  Tetanus shot: Get a tetanus shot every 10 years.  Pneumococcal, hepatitis A, and RSV shots: Ask your care team if you need these based on your risk.  Shingles shot (for age 50 and up)  General health tests  Diabetes screening:  Starting at age 35, Get screened for diabetes at least every 3 years.  If you are younger than age 35, ask your care team if you should be screened for diabetes.  Cholesterol test: At age 39, start having a cholesterol test every 5 years, or more often if advised.  Bone density scan (DEXA): At age 50, ask your care team if you should have this scan for osteoporosis (brittle bones).  Hepatitis C: Get tested at least once in your life.  STIs (sexually  transmitted infections)  Before age 24: Ask your care team if you should be screened for STIs.  After age 24: Get screened for STIs if you're at risk. You are at risk for STIs (including HIV) if:  You are sexually active with more than one person.  You don't use condoms every time.  You or a partner was diagnosed with a sexually transmitted infection.  If you are at risk for HIV, ask about PrEP medicine to prevent HIV.  Get tested for HIV at least once in your life, whether you are at risk for HIV or not.  Cancer screening tests  Cervical cancer screening: If you have a cervix, begin getting regular cervical cancer screening tests starting at age 21.  Breast cancer scan (mammogram): If you've ever had breasts, begin having regular mammograms starting at age 40. This is a scan to check for breast cancer.  Colon cancer screening: It is important to start screening for colon cancer at age 45.  Have a colonoscopy test every 10 years (or more often if you're at risk) Or, ask your provider about stool tests like a FIT test every year or Cologuard test every 3 years.  To learn more about your testing options, visit:   .  For help making a decision, visit:   https://bit.ly/mk36340.  Prostate cancer screening test: If you have a prostate, ask your care team if a prostate cancer screening test (PSA) at age 55 is right for you.  Lung cancer screening: If you are a current or former smoker ages 50 to 80, ask your care team if ongoing lung cancer screenings are right for you.  For informational purposes only. Not to replace the advice of your health care provider. Copyright   2023 [facility]. All rights reserved. Clinically reviewed by the [facility]. [employer] 244834 - REV 01/24.  Learning About Activities of Daily Living  What are activities of daily living?     Activities of daily living (ADLs) are the basic self-care tasks you do every day. These include eating, bathing, dressing,  and moving around.  As you age, and if you have health problems, you may find that it's harder to do some of these tasks. If so, your doctor can suggest ideas that may help.  To measure what kind of help you may need, your doctor will ask how well you are able to do ADLs. Let your doctor know if there are any tasks that you are having trouble doing. This is an important first step to getting help. And when you have the help you need, you can stay as independent as possible.  How will a doctor assess your ADLs?  Asking about ADLs is part of a routine health checkup your doctor will likely do as you age. Your health check might be done in a doctor's office, in your home, or at a hospital. The goal is to find out if you are having any problems that could make it hard to care for yourself or that make it unsafe for you to be on your own.  To measure your ADLs, your doctor will ask how hard it is for you to do routine tasks. Your doctor may also want to know if you have changed the way you do a task because of a health problem. Your doctor may watch how you:  Walk back and forth.  Keep your balance while you stand or walk.  Move from sitting to standing or from a bed to a chair.  Button or unbutton a shirt or sweater.  Remove and put on your shoes.  It's common to feel a little worried or anxious if you find you can't do all the things you used to be able to do. Talking with your doctor about ADLs is a way to make sure you're as safe as possible and able to care for yourself as well as you can. You may want to bring a caregiver, friend, or family member to your checkup. They can help you talk to your doctor.  Follow-up care is a key part of your treatment and safety. Be sure to make and go to all appointments, and call your doctor if you are having problems. It's also a good idea to know your test results and keep a list of the medicines you take.  Current as of: October 24, 2023  Content Version: 14.1    3560-0818  Healthwise, [employer].   Care instructions adapted under license by your healthcare professional. If you have questions about a medical condition or this instruction, always ask your healthcare professional. [employer] disclaims any warranty or liability for your use of this information.    Preventing Falls: Care Instructions  Injuries and health problems such as trouble walking or poor eyesight can increase your risk of falling. So can some medicines. But there are things you can do to help prevent falls. You can exercise to get stronger. You can also arrange your home to make it safer.    Talk to your doctor about the medicines you take. Ask if any of them increase the risk of falls and whether they can be changed or stopped.   Try to exercise regularly. It can help improve your strength and balance. This can help lower your risk of falling.     Practice fall safety and prevention.    Wear low-heeled shoes that fit well and give your feet good support. Talk to your doctor if you have foot problems that make this hard.  Carry a cellphone or wear a medical alert device that you can use to call for help.  Use stepladders instead of chairs to reach high objects. Don't climb if you're at risk for falls. Ask for help, if needed.  Wear the correct eyeglasses, if you need them.    Make your home safer.    Remove rugs, cords, clutter, and furniture from walkways.  Keep your house well lit. Use night-lights in hallways and bathrooms.  Install and use sturdy handrails on stairways.  Wear nonskid footwear, even inside. Don't walk barefoot or in socks without shoes.    Be safe outside.    Use handrails, curb cuts, and ramps whenever possible.  Keep your hands free by using a shoulder bag or backpack.  Try to walk in well-lit areas. Watch out for uneven ground, changes in pavement, and debris.  Be careful in the winter. Walk on the grass or gravel when sidewalks are slippery. Use de-icer on steps and walkways.  "Add non-slip devices to shoes.    Put grab bars and nonskid mats in your shower or tub and near the toilet. Try to use a shower chair or bath bench when bathing.   Get into a tub or shower by putting in your weaker leg first. Get out with your strong side first. Have a phone or medical alert device in the bathroom with you.   Where can you learn more?  Go to https://www.[employer].MyLifeBrand/patiented  Enter G117 in the search box to learn more about \"Preventing Falls: Care Instructions.\"  Current as of: July 17, 2023               Content Version: 14.0    0925-5468 [employer].   Care instructions adapted under license by your healthcare professional. If you have questions about a medical condition or this instruction, always ask your healthcare professional. [employer] disclaims any warranty or liability for your use of this information.      Learning About Sleeping Well  What does sleeping well mean?     Sleeping well means getting enough sleep to feel good and stay healthy. How much sleep is enough varies among people.  The number of hours you sleep and how you feel when you wake up are both important. If you do not feel refreshed, you probably need more sleep. Another sign of not getting enough sleep is feeling tired during the day.  Experts recommend that adults get at least 7 or more hours of sleep per day. Children and older adults need more sleep.  Why is getting enough sleep important?  Getting enough quality sleep is a basic part of good health. When your sleep suffers, your physical health, mood, and your thoughts can suffer too. You may find yourself feeling more grumpy or stressed. Not getting enough sleep also can lead to serious problems, including injury, accidents, anxiety, and depression.  What might cause poor sleeping?  Many things can cause sleep problems, including:  Changes to your sleep schedule.  Stress. Stress can be caused by fear about a single event, such as giving a " "speech. Or you may have ongoing stress, such as worry about work or school.  Depression, anxiety, and other mental or emotional conditions.  Changes in your sleep habits or surroundings. This includes changes that happen where you sleep, such as noise, light, or sleeping in a different bed. It also includes changes in your sleep pattern, such as having jet lag or working a late shift.  Health problems, such as pain, breathing problems, and restless legs syndrome.  Lack of regular exercise.  Using alcohol, nicotine, or caffeine before bed.  How can you help yourself?  Here are some tips that may help you sleep more soundly and wake up feeling more refreshed.  Your sleeping area   Use your bedroom only for sleeping and sex. A bit of light reading may help you fall asleep. But if it doesn't, do your reading elsewhere in the house. Try not to use your TV, computer, smartphone, or tablet while you are in bed.  Be sure your bed is big enough to stretch out comfortably, especially if you have a sleep partner.  Keep your bedroom quiet, dark, and cool. Use curtains, blinds, or a sleep mask to block out light. To block out noise, use earplugs, soothing music, or a \"white noise\" machine.  Your evening and bedtime routine   Create a relaxing bedtime routine. You might want to take a warm shower or bath, or listen to soothing music.  Go to bed at the same time every night. And get up at the same time every morning, even if you feel tired.  What to avoid   Limit caffeine (coffee, tea, caffeinated sodas) during the day, and don't have any for at least 6 hours before bedtime.  Avoid drinking alcohol before bedtime. Alcohol can cause you to wake up more often during the night.  Try not to smoke or use tobacco, especially in the evening. Nicotine can keep you awake.  Limit naps during the day, especially close to bedtime.  Avoid lying in bed awake for too long. If you can't fall asleep or if you wake up in the middle of the night and " "can't get back to sleep within about 20 minutes, get out of bed and go to another room until you feel sleepy.  Avoid taking medicine right before bed that may keep you awake or make you feel hyper or energized. Your doctor can tell you if your medicine may do this and if you can take it earlier in the day.  If you can't sleep   Imagine yourself in a peaceful, pleasant scene. Focus on the details and feelings of being in a place that is relaxing.  Get up and do a quiet or boring activity until you feel sleepy.  Avoid drinking any liquids before going to bed to help prevent waking up often to use the bathroom.  Where can you learn more?  Go to https://www.[employer].net/patiented  Enter J942 in the search box to learn more about \"Learning About Sleeping Well.\"  Current as of: July 10, 2023  Content Version: 14.1    8521-4963 [employer].   Care instructions adapted under license by your healthcare professional. If you have questions about a medical condition or this instruction, always ask your healthcare professional. [employer] disclaims any warranty or liability for your use of this information.    Bladder Training: Care Instructions  Your Care Instructions     Bladder training is used to treat urge incontinence and stress incontinence. Urge incontinence means that the need to urinate comes on so fast that you can't get to a toilet in time. Stress incontinence means that you leak urine because of pressure on your bladder. For example, it may happen when you laugh, cough, or lift something heavy.  Bladder training can increase how long you can wait before you have to urinate. It can also help your bladder hold more urine. And it can give you better control over the urge to urinate.  It is important to remember that bladder training takes a few weeks to a few months to make a difference. You may not see results right away, but don't give up.  Follow-up care is a key part of your treatment " and safety. Be sure to make and go to all appointments, and call your doctor if you are having problems. It's also a good idea to know your test results and keep a list of the medicines you take.  How can you care for yourself at home?  Work with your doctor to come up with a bladder training program that is right for you. You may use one or more of the following methods.  Delayed urination  In the beginning, try to keep from urinating for 5 minutes after you first feel the need to go.  While you wait, take deep, slow breaths to relax. Kegel exercises can also help you delay the need to go to the bathroom.  After some practice, when you can easily wait 5 minutes to urinate, try to wait 10 minutes before you urinate.  Slowly increase the waiting period until you are able to control when you have to urinate.  Scheduled urination  Empty your bladder when you first wake up in the morning.  Schedule times throughout the day when you will urinate.  Start by going to the bathroom every hour, even if you don't need to go.  Slowly increase the time between trips to the bathroom.  When you have found a schedule that works well for you, keep doing it.  If you wake up during the night and have to urinate, do it. Apply your schedule to waking hours only.  Kegel exercises  These tighten and strengthen pelvic muscles, which can help you control the flow of urine. (If doing these exercises causes pain, stop doing them and talk with your doctor.) To do Kegel exercises:  Squeeze your muscles as if you were trying not to pass gas. Or squeeze your muscles as if you were stopping the flow of urine. Your belly, legs, and buttocks shouldn't move.  Hold the squeeze for 3 seconds, then relax for 5 to 10 seconds.  Start with 3 seconds, then add 1 second each week until you are able to squeeze for 10 seconds.  Repeat the exercise 10 times a session. Do 3 to 8 sessions a day.  When should you call for help?  Watch closely for changes in your  "health, and be sure to contact your doctor if:    Your incontinence is getting worse.     You do not get better as expected.   Where can you learn more?  Go to https://www.[employer].net/patiented  Enter V684 in the search box to learn more about \"Bladder Training: Care Instructions.\"  Current as of: November 15, 2023               Content Version: 14.0    2411-4557 [employer].   Care instructions adapted under license by your healthcare professional. If you have questions about a medical condition or this instruction, always ask your healthcare professional. [employer] disclaims any warranty or liability for your use of this information.         "

## 2024-11-13 ENCOUNTER — TRANSFERRED RECORDS (OUTPATIENT)
Dept: HEALTH INFORMATION MANAGEMENT | Facility: CLINIC | Age: 69
End: 2024-11-13
Payer: COMMERCIAL

## 2024-11-20 ENCOUNTER — TELEPHONE (OUTPATIENT)
Dept: OPHTHALMOLOGY | Facility: CLINIC | Age: 69
End: 2024-11-20
Payer: COMMERCIAL

## 2024-11-20 NOTE — TELEPHONE ENCOUNTER
[facility] Call Center    Phone Message    May a detailed message be left on voicemail: yes     Reason for Call: Appointment Intake    Referring Provider Name: n/a  Diagnosis and/or Symptoms: Floater & flashers. Smudge like feeling in right eye    No same days. Per protocol writer to send TE    Action Taken: Message routed to:  [facility]    Travel Screening: Not Applicable     Date of Service:

## 2024-11-21 ENCOUNTER — OFFICE VISIT (OUTPATIENT)
Dept: OPHTHALMOLOGY | Facility: CLINIC | Age: 69
End: 2024-11-21
Attending: OPHTHALMOLOGY
Payer: COMMERCIAL

## 2024-11-21 DIAGNOSIS — H33.001 MACULA-ON RHEGMATOGENOUS RETINAL DETACHMENT, RIGHT: ICD-10-CM

## 2024-11-21 DIAGNOSIS — H35.063 RETINAL VASCULITIS OF BOTH EYES: ICD-10-CM

## 2024-11-21 DIAGNOSIS — H33.103 BILATERAL RETINOSCHISIS: Primary | ICD-10-CM

## 2024-11-21 DIAGNOSIS — H43.811 PVD (POSTERIOR VITREOUS DETACHMENT), RIGHT: ICD-10-CM

## 2024-11-21 PROCEDURE — 92250 FUNDUS PHOTOGRAPHY W/I&R: CPT | Performed by: OPHTHALMOLOGY

## 2024-11-21 PROCEDURE — 92134 CPTRZ OPH DX IMG PST SGM RTA: CPT | Performed by: OPHTHALMOLOGY

## 2024-11-21 PROCEDURE — 92133 CPTRZD OPH DX IMG PST SGM ON: CPT | Performed by: OPHTHALMOLOGY

## 2024-11-21 PROCEDURE — 92235 FLUORESCEIN ANGRPH MLTIFRAME: CPT | Performed by: OPHTHALMOLOGY

## 2024-11-21 ASSESSMENT — CONF VISUAL FIELD
OD_INFERIOR_NASAL_RESTRICTION: 0
OS_INFERIOR_NASAL_RESTRICTION: 0
OD_SUPERIOR_TEMPORAL_RESTRICTION: 0
OS_NORMAL: 1
OD_INFERIOR_TEMPORAL_RESTRICTION: 0
OS_SUPERIOR_NASAL_RESTRICTION: 0
OD_NORMAL: 1
METHOD: COUNTING FINGERS
OD_SUPERIOR_NASAL_RESTRICTION: 0
OS_SUPERIOR_TEMPORAL_RESTRICTION: 0
OS_INFERIOR_TEMPORAL_RESTRICTION: 0

## 2024-11-21 ASSESSMENT — VISUAL ACUITY
CORRECTION_TYPE: GLASSES
OS_CC: 20/20
OD_CC: 20/20
METHOD: SNELLEN - LINEAR
OS_CC+: -1

## 2024-11-21 ASSESSMENT — SLIT LAMP EXAM - LIDS: COMMENTS: MEIBOMIAN GLAND DYSFUNCTION

## 2024-11-21 ASSESSMENT — REFRACTION_WEARINGRX
OD_CYLINDER: +1.75
OD_ADD: +2.50
OD_AXIS: 003
SPECS_TYPE: PAL
OD_SPHERE: -2.25
OS_CYLINDER: +2.00
OS_AXIS: 165
OS_SPHERE: -2.50
OS_ADD: +2.50

## 2024-11-21 ASSESSMENT — TONOMETRY
IOP_METHOD: ICARE
OS_IOP_MMHG: 21
OD_IOP_MMHG: 18

## 2024-11-21 ASSESSMENT — EXTERNAL EXAM - LEFT EYE: OS_EXAM: NORMAL

## 2024-11-21 ASSESSMENT — CUP TO DISC RATIO
OS_RATIO: 0.15
OD_RATIO: 0.1

## 2024-11-21 ASSESSMENT — EXTERNAL EXAM - RIGHT EYE: OD_EXAM: NORMAL

## 2024-11-21 NOTE — NURSING NOTE
"Chief Complaints and History of Present Illnesses   Patient presents with    Flashes Right Eye     New Pt here for flashes/floaters.     Chief Complaint(s) and History of Present Illness(es)       Flashes Right Eye              Laterality: right eye    Associated symptoms: floaters, blurred vision and headaches.  Negative for shade, peripheral vision loss and photophobia    Pain scale: 0/10    Comments: New Pt here for flashes/floaters.              Comments    Pt noted new flashes and floaters RE starting on Monday 11/18.  No pain.  She had a slight headache at the time of the flashes/floaters.  Pt had a \"smudge\" in center vision yesterday.  Less cloudy vision today.  AT's PRN.  No DM.    MISAEL Hernandez November 21, 2024 12:15 PM                       "

## 2024-11-21 NOTE — PROGRESS NOTES
"Chief Complaint(s) and History of Present Illness(es)       Flashes Right Eye              Laterality: right eye    Associated symptoms: floaters, blurred vision and headaches.  Negative for shade, peripheral vision loss and photophobia    Pain scale: 0/10    Comments: New Pt here for flashes/floaters.              Comments    Pt noted new flashes and floaters RE starting on Monday 11/18.  No pain.  She had a slight headache at the time of the flashes/floaters.  Pt had a \"smudge\" in center vision yesterday.  Less cloudy vision today.  AT's PRN.  No DM.    MISAEL Hernandez November 21, 2024 12:15 PM                        Reviewed technician HPI and agree with the following addenda: she noticed a bright light in the right side of her vision, a smudge in the center of her vision, and a circular floater.     Review of systems for the eyes was negative other than the pertinent positives/negatives listed in the HPI.    Ocular Meds: artificial tears prn    Ocular Hx: dry eyes    FOHx: noncontributory     PMHx:   Past Medical History:   Diagnosis Date    Acute gastritis 03/12/2008     Problem list name updated by automated process. Provider to review    Acute right-sided thoracic back pain 08/18/2016    CARDIOVASCULAR SCREENING; LDL GOAL LESS THAN 160 10/31/2010    Closed fracture of distal end of right radius with routine healing, unspecified fracture morphology, subsequent encounter 08/18/2016    Constipation 03/19/2014    Depressive disorder 2000    Depressive disorder, not elsewhere classified     Situtational    Fracture of rib 08/18/2016    Health Care Home 06/07/2011    EMERGENCY CARE PLAN Presenting Problem Signs and Symptoms Treatment Plan   Questions or conerns during clinic hours    I will call the clinic directly    Questions or conerns outside clinic *    IBS (irritable bowel syndrome)     Insomnia, unspecified     Mild intermittent asthma     rare use of inhaler.      Persistent depressive disorder " 12/18/2019    Slow transit constipation 03/12/2008        Outpatient medications:  Current Outpatient Medications   Medication Instructions    conjugated estrogens (PREMARIN) 0.625 MG/GM vaginal cream INSERT 1.5 GRAMS VAGINALLY THREE TIMES WEEKLY    sertraline (ZOLOFT) 100 MG tablet TAKE ONE AND ONE-HALF TABLETS BY MOUTH EVERY DAY    traZODone (DESYREL) 100 MG tablet TAKE ONE AND ONE-HALF TABLET BY MOUTH EVERY NIGHT AT BEDTIME         Assessment & Plan      Debbie Richardson is a 69 year old female with the following diagnoses:    1. 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 PVD (posterior vitreous detachment), right         Symptoms started Monday, 11/18. Examination and fundus photography showed inferotemporal RRD with temporal hole, new larios ring. She had 20/20 vision, OCT shows attached macula.     Patient disposition:   No follow-ups on file.    Patient seen and discussed with Dr. Rosas and Dr. Stinson who agreed with this assessment and plan          Ophthalmology Imaging and Procedure Results:  OCT Retina Spectralis OU (both eyes)          Performed by: EG   . Patient cooperation: Reliable   .     Right Eye  Reliability of the test: Good   .     Left Eye  Reliability of the test: Good   .     Notes  OD: PVD. Good foveal contour. No IRF/SRF. Good lamination.  OS: Hyaloid attached. Good foveal contour. NO IRF/SRF. Good lamination.          OCT Optic Nerve RNFL Spectralis OU (both eyes)          Performed by: EG   . Patient cooperation: Reliable   .     Right Eye  Reliability of the test: Good   . Test Findings: Normal   . Interpretation: Normal   . Plan: Monitor   . Interval: Initial   .     Left Eye  Reliability of the test: Good   . Test Findings: Normal   . Interpretation: Normal   . Plan: Monitor   . Interval: Initial   .          Fundus Photos OU (both eyes)          Performed by: EG   .     Notes  OD temporal/inferotemporal retinal detachment with operculated hole temporally.  Macula attached  OS normal            Ronen Gao MD  Resident Physician, PGY-2  Department of Ophthalmology  11/21/2024 1:50 PM

## 2024-11-21 NOTE — PROGRESS NOTES
CC: Flashes and floaters   First appointment with me  Referred by: Dr. Gao  HPI: Debbie Richardson is a 69 year old year-old patient with history of migraine who presents for evaluation of a possible retinal detachment.     24 had itching eyes followed by floaters and flashes of light right eye. Seen in acute clinic earlier today with concern for retinal detachment.     No systemic symptoms other than a mild headache.    Grandchild has a dog. No history of lupus, multiple sclerosis. No problems with lungs, kidneys, changes in hearing.     PMH: migraine, IBS  No pets;   Takes care of a grandchild that has an old dog  Past ocular history: dry eye   FH:father with Age related macular degeneration and macular hole       Retinal Imagin24   OCT macula 24   RE: trace vitreous debris, small Retinal pigment epithelium irregularity, schisis temporal cuts   LE: PHF attached, normal macula, schisis through temporal cuts     Optos pic consistent with exam 24   Autofluorescence normal 24   Fluorescein angiography 24   Right eye normal filling, peripheral temporal vascular leakage, and intraretinal microvascular abnormalities  Left eye normal filling, peripheral temporal and inferior vascular leakage, and intraretinal microvascular abnormalities    Retinal nerve fiber layer normal both eyes     Assessment:    # Bilateral retinoschisis both eyes  retinal elevation consistent with schisis. No detachment. No tears  Recommend to Observe, if progressive can laser  - Discussed signs and symptoms of Retinal detachment  that would warrant emergent evaluation.     # Bilateral retinal vasculitis.   She has peripheral vascular leakage on fluorescein angiography. No risk factors or systemic symptoms to narrow differential for uveitis.  - Check syphilis, TB, and Anti-nuclear antibody given family history of lupus and refer to Dr. Roth   - Mild and peripheral with no vision loss  therefore will hold on treatment at this time and follow up with uveitis    Follow up with Dr. Roth within 3-4 weeks  Follow up with retina sooner as needed   Retina detachment precautions were discussed with the patient (presence or increased in flashes, floaters or a curtain in the visual field) and was asked to return if any of the those occur        Ron Gant PGY3  ~~~~~~~~~~~~~~~~~~~~~~~~~~~~~~~~~~   Complete documentation of historical and exam elements from today's encounter can be found in the full encounter summary report (not reduplicated in this progress note).  I personally obtained the chief complaint(s) and history of present illness.  I confirmed and edited as necessary the review of systems, past medical/surgical history, family history, social history, and examination findings as documented by others; and I examined the patient myself.  I personally reviewed the relevant tests, images, and reports as documented above.  I personally reviewed the ophthalmic test(s) associated with this encounter, agree with the interpretation(s) as documented by the resident/fellow, and have edited the corresponding report(s) as necessary.   I formulated and edited as necessary the assessment and plan and discussed the findings and management plan with the patient and family    Lilli Stinson MD  Professor of Ophthalmology.   Vitreo-retinal surgeon   Department of Ophthalmology and Visual Neurosciences   [facility]  Phone: [phone]   Fax: [phone]

## 2024-12-17 ENCOUNTER — OFFICE VISIT (OUTPATIENT)
Dept: OPHTHALMOLOGY | Facility: CLINIC | Age: 69
End: 2024-12-17
Attending: OPHTHALMOLOGY
Payer: COMMERCIAL

## 2024-12-17 DIAGNOSIS — H33.103 BILATERAL RETINOSCHISIS: ICD-10-CM

## 2024-12-17 DIAGNOSIS — H35.013 RETINAL VASCULAR CHANGES, BILATERAL: Primary | ICD-10-CM

## 2024-12-17 DIAGNOSIS — R53.83 FATIGUE, UNSPECIFIED TYPE: ICD-10-CM

## 2024-12-17 DIAGNOSIS — H43.811 POSTERIOR VITREOUS DETACHMENT OF RIGHT EYE: ICD-10-CM

## 2024-12-17 PROCEDURE — G0463 HOSPITAL OUTPT CLINIC VISIT: HCPCS | Performed by: OPHTHALMOLOGY

## 2024-12-17 ASSESSMENT — CONF VISUAL FIELD
METHOD: COUNTING FINGERS
OS_SUPERIOR_NASAL_RESTRICTION: 0
OS_INFERIOR_NASAL_RESTRICTION: 0
OS_INFERIOR_TEMPORAL_RESTRICTION: 0
OS_SUPERIOR_TEMPORAL_RESTRICTION: 0
OD_SUPERIOR_TEMPORAL_RESTRICTION: 0
OD_INFERIOR_TEMPORAL_RESTRICTION: 0
OS_NORMAL: 1
OD_INFERIOR_NASAL_RESTRICTION: 0
OD_NORMAL: 1
OD_SUPERIOR_NASAL_RESTRICTION: 0

## 2024-12-17 ASSESSMENT — EXTERNAL EXAM - RIGHT EYE: OD_EXAM: NORMAL

## 2024-12-17 ASSESSMENT — SLIT LAMP EXAM - LIDS
COMMENTS: NORMAL
COMMENTS: NORMAL

## 2024-12-17 ASSESSMENT — REFRACTION_WEARINGRX
OD_SPHERE: -2.25
SPECS_TYPE: PAL
OD_CYLINDER: +1.75
OD_AXIS: 003
OD_ADD: +2.50
OS_ADD: +2.50
OS_AXIS: 165
OS_CYLINDER: +2.00
OS_SPHERE: -2.50

## 2024-12-17 ASSESSMENT — VISUAL ACUITY
OD_CC: 20/20
CORRECTION_TYPE: GLASSES
METHOD: SNELLEN - LINEAR
OS_CC: 20/25

## 2024-12-17 ASSESSMENT — CUP TO DISC RATIO
OS_RATIO: 0.3
OD_RATIO: 0.3

## 2024-12-17 ASSESSMENT — TONOMETRY
IOP_METHOD: TONOPEN
OS_IOP_MMHG: 18
OD_IOP_MMHG: 20

## 2024-12-17 ASSESSMENT — EXTERNAL EXAM - LEFT EYE: OS_EXAM: NORMAL

## 2024-12-17 NOTE — Clinical Note
Hu Reeder. You might consider removing RD diagnosis from your visit. No active inflammation but checking labs and MRI. Thanks

## 2024-12-17 NOTE — PATIENT INSTRUCTIONS
Recommend additional diagnostic testing given systemic symptoms and less so eyes: CBC, CMP, TSH, ESR, CRP, TSH, Vitamin B12/Folate, Lyme, RF. We will message you about these particularly if there are any levels to suggest polymyalgia rheumatica  MRI of the brain and orbit given retinal vascular changes and intermittent weakness/numbness to rule out retinal vascular disease  Continue artificial tears  No new treatment needed

## 2024-12-17 NOTE — LETTER
12/17/2024       RE: Debbie Richardson  [address]  [place] [address]     Dear Colleague,    Thank you for referring your patient, Debbie Richardson, to the [facility] [place] at [facility]. Please see a copy of my visit note below.    Chief Complaint/Presenting Concern: Uveitis evaluation    History of Present Illness:   Debbie Richardson is a 69 year old patient who presents for evaluation of retinal vasculitis from Dr. Stinson. Debbie Richardson is seen by Dr. Stinson on November 21, 2024 and identified as having peripheral retinal schisis.  Evaluation on that day also included retinal angiography during which peripheral retinal vascular leakage was identified.  Laboratory testing was requested and this evaluation coordinated.    Today, Ms. Osmany Richardson reports the floater is still present but less flashing right eye. Vision is just a bit blurry but no worse. Left eye is fine without floaters.    Additional Ocular History:   Posterior vitreous detachment right eye   Dry eyes    Relevant Past Medical/Family/Social History:  No history of systemic inflammatory disease  Does not recall ever being told or treated for Lyme. But does recall episodes of intermittent dizziness  Migraines and tingling which used to happen in arms, and no migraines in years.   Osteoporosis. Bone scan in few days  Takes Sertaline and Trazodone to help staying asleep  Got light treatment for eczema and previously get blisters on hands which went away.  Gingivitis managed by periodontist    Sister and father with hypothyroidism and Polymyalgia Rheumatica. Has spent a lot of time outside growing up.     Relevant Review of Systems:   Fatigue is prominent late in the day even with lots of sleep  Intermittent achiness in the evening which seems to be flu like even without cold symptoms and not related to activity.   Some  intermittent headaches side of head. No jaw pain but has been checked for TMJ.This does not impact ability to finish a meal.  There is some instability at times.   Has hip bursitis    Labs; 11/22/2024:Normal/negative: QuantiFERON, treponema antibody, NAMRATA     Current eye related medications: Artificial tears    Retina/Uveitis Imaging:  OCT Spectralis Macula--reviewed from November 21, 2024  right eye: Normal foveal contour without fluid.  Temporal retinal schisis without detachment  left eye: Normal foveal contour without fluid.  Temporal retinal schisis without detachment    RNFL 11/21/24 normal thickness, 96 microns right eye and 98 microns left eye     Optos Fundus Photos-reviewed from November 21, 2024  right eye: Inferotemporal retinal schisis  Left eye: Temporal retinoschisis, inferior hypopigmented spot    Optos FA -reviewed from November 21, 2024  right eye: Generally normal transit.  Temporal micro aneurysmal changes with adjacent large vessel segmental staining but no true leakage.  No occlusions.  No disc, no foveal leakage.  Left eye: Temporal micro aneurysmal changes with adjacent large vessel segmental staining but no true leakage.  Faint area of equatorial staining nasally.  Focal area of pooling inferiorly. No occlusions.  No disc, no foveal leakage.    Assessment:    1. Retinal vascular changes, bilateral (Primary)  More subtle right eye and inferior changes and temporal left eye.    2. Bilateral retinoschisis  Stable without detachment    3. Posterior vitreous detachment of right eye  Without new retinal breaks    Plan/Recommendations:  Discussed findings with patient.  The retinal vessel changes seen on the angiogram last month are most likely secondary to the retinoschisis as they are primarily localized to these areas.  There is no active intraocular inflammation and no retinal edema was present on OCT last visit.  No specific treatment for this is recommended but we will get some labs as  below  The previously seen areas of peripheral retinoschisis are stable without detachment in either eye. No retinal laser nor surgery is recommended but we will continue to monitor  Eye pressure is normal in both eyes    Recommend additional diagnostic testing given systemic symptoms and less so eyes: CBC, CMP, TSH, ESR, CRP, TSH, Vitamin B12/Folate, Lyme, RF. We will message you about these particularly if there are any levels to suggest polymyalgia rheumatica  MRI of the brain and orbit given retinal vascular changes and intermittent weakness/numbness to rule out retinal vascular disease  Continue artificial tears  No new treatment needed    C Gonzalez late Jan-early Feb tonopen, dilate, no testing    Physician Attestation    Attending Physician Attestation:  Complete documentation of historical and exam elements from today's encounter can be found in the full encounter summary report (not reduplicated in this progress note). I personally obtained the chief complaint(s) and history of present illness. I confirmed and edited as necessary the review of systems, past medical/surgical history, family history, social history, and examination findings as documented by others; and I examined the patient myself. I personally reviewed the relevant tests, images, and reports as documented above. I formulated and edited as necessary the assessment and plan and discussed the findings and management plan with the patient and any family members present at the time of the visit.  Osiel Roth M.D., Uveitis and Medical Retina, December 17, 2024     Again, thank you for allowing me to participate in the care of your patient.      Sincerely,    Osiel Roth MD  Uveitis and Medical Retina    Department of Ophthalmology & Visual Neurosciences  [facility]

## 2024-12-17 NOTE — PROGRESS NOTES
Chief Complaint/Presenting Concern: Uveitis evaluation    History of Present Illness:   Debbie Richardson is a 69 year old patient who presents for evaluation of retinal vasculitis from Dr. Stinson. Debbie Richardson is seen by Dr. Stinson on November 21, 2024 and identified as having peripheral retinal schisis.  Evaluation on that day also included retinal angiography during which peripheral retinal vascular leakage was identified.  Laboratory testing was requested and this evaluation coordinated.    Today, Ms. Osmany Richardson reports the floater is still present but less flashing right eye. Vision is just a bit blurry but no worse. Left eye is fine without floaters.    Additional Ocular History:   Posterior vitreous detachment right eye   Dry eyes    Relevant Past Medical/Family/Social History:  No history of systemic inflammatory disease  Does not recall ever being told or treated for Lyme. But does recall episodes of intermittent dizziness  Migraines and tingling which used to happen in arms, and no migraines in years.   Osteoporosis. Bone scan in few days  Takes Sertaline and Trazodone to help staying asleep  Got light treatment for eczema and previously get blisters on hands which went away.  Gingivitis managed by periodontist    Sister and Father with hypothyroidism and Polymyalgia Rheumatica. Has spent a lot of time outside growing up.     Relevant Review of Systems:   Fatigue is prominent late in the day even with lots of sleep  Intermittent achiness in the evening which seems to be flu like even without cold symptoms and not related to activity.   Some intermittent headaches side of head. No jaw pain but has been checked for TMJ.This does not impact ability to finish a meal.  There is some instability at times.   Has hip bursitis    Labs; 11/22/2024:Normal/negative: QuantiFERON, treponema antibody, NAMRATA     Current eye related medications: Artificial tears    Retina/Uveitis Imaging:  OCT  Spectralis Macula--reviewed from November 21, 2024  right eye: Normal foveal contour without fluid.  Temporal retinal schisis without detachment  left eye: Normal foveal contour without fluid.  Temporal retinal schisis without detachment    RNFL 11/21/24 normal thickness, 96 microns right eye and 98 microns left eye     Optos Fundus Photos-reviewed from November 21, 2024  right eye: Inferotemporal retinal schisis  Left eye: Temporal retinoschisis, inferior hypopigmented spot    Optos FA -reviewed from November 21, 2024  right eye: Generally normal transit.  Temporal micro aneurysmal changes with adjacent large vessel segmental staining but no true leakage.  No occlusions.  No disc, no foveal leakage.  Left eye: Temporal micro aneurysmal changes with adjacent large vessel segmental staining but no true leakage.  Faint area of equatorial staining nasally.  Focal area of pooling inferiorly. No occlusions.  No disc, no foveal leakage.    Assessment:    1. Retinal vascular changes, bilateral (Primary)  More subtle right eye and inferior changes and temporal left eye.    2. Bilateral retinoschisis  Stable without detachment    3. Posterior vitreous detachment of right eye  Without new retinal breaks    Plan/Recommendations:  Discussed findings with patient.  The retinal vessel changes seen on the angiogram last month are most likely secondary to the retinoschisis as they are primarily localized to these areas.  There is no active intraocular inflammation and no retinal edema was present on OCT last visit.  No specific treatment for this is recommended but we will get some labs as below  The previously seen areas of peripheral retinoschisis are stable without detachment in either eye. No retinal laser nor surgery is recommended but we will continue to monitor  Eye pressure is normal in both eyes  Recommend additional diagnostic testing given systemic symptoms and less so eyes: CBC, CMP, TSH, ESR, CRP, TSH, Vitamin  B12/Folate, Lyme, RF. We will message you about these particularly if there are any levels to suggest polymyalgia rheumatica  MRI of the brain and orbit given retinal vascular changes and intermittent weakness/numbness to rule out retinal vascular disease  Continue artificial tears  No new treatment needed    AI Roth late Jan-early Feb arnie lomeli, no testing    Physician Attestation     Attending Physician Attestation:  Complete documentation of historical and exam elements from today's encounter can be found in the full encounter summary report (not reduplicated in this progress note). I personally obtained the chief complaint(s) and history of present illness. I confirmed and edited as necessary the review of systems, past medical/surgical history, family history, social history, and examination findings as documented by others; and I examined the patient myself. I personally reviewed the relevant tests, images, and reports as documented above. I formulated and edited as necessary the assessment and plan and discussed the findings and management plan with the patient and any family members present at the time of the visit.  Osiel Roth M.D., Uveitis and Medical Retina, December 17, 2024

## 2024-12-17 NOTE — NURSING NOTE
Chief Complaints and History of Present Illnesses   Patient presents with    Retinal Vasculitis Evaluation     Chief Complaint(s) and History of Present Illness(es)       Retinal Vasculitis Evaluation              Laterality: both eyes    Onset: gradual    Onset: months ago    Quality: States va is the same since last visit      Severity: moderate    Frequency: intermittently    Associated symptoms: photophobia, flashes (couple weeks ago but it has decreased) and floaters    Treatments tried: artificial tears    Pain scale: 0/10              Comments    Here for Bilateral retinal vasculitis  AT inez Morrison COT 9:02 AM December 17, 2024

## 2024-12-19 ENCOUNTER — LAB (OUTPATIENT)
Dept: LAB | Facility: CLINIC | Age: 69
End: 2024-12-19
Payer: COMMERCIAL

## 2024-12-19 DIAGNOSIS — R53.83 FATIGUE, UNSPECIFIED TYPE: ICD-10-CM

## 2024-12-19 DIAGNOSIS — H35.013 RETINAL VASCULAR CHANGES, BILATERAL: ICD-10-CM

## 2024-12-19 LAB
ALBUMIN SERPL BCG-MCNC: 4.3 G/DL (ref 3.5–5.2)
ALP SERPL-CCNC: 57 U/L (ref 40–150)
ALT SERPL W P-5'-P-CCNC: 7 U/L (ref 0–50)
ANION GAP SERPL CALCULATED.3IONS-SCNC: 10 MMOL/L (ref 7–15)
AST SERPL W P-5'-P-CCNC: 18 U/L (ref 0–45)
BASOPHILS # BLD AUTO: 0 10E3/UL (ref 0–0.2)
BASOPHILS NFR BLD AUTO: 0 %
BILIRUB SERPL-MCNC: 0.5 MG/DL
BUN SERPL-MCNC: 12 MG/DL (ref 8–23)
CALCIUM SERPL-MCNC: 9.6 MG/DL (ref 8.8–10.4)
CHLORIDE SERPL-SCNC: 102 MMOL/L (ref 98–107)
CREAT SERPL-MCNC: 0.93 MG/DL (ref 0.51–0.95)
CRP SERPL-MCNC: 34.6 MG/L
EGFRCR SERPLBLD CKD-EPI 2021: 67 ML/MIN/1.73M2
EOSINOPHIL # BLD AUTO: 0.1 10E3/UL (ref 0–0.7)
EOSINOPHIL NFR BLD AUTO: 1 %
ERYTHROCYTE [DISTWIDTH] IN BLOOD BY AUTOMATED COUNT: 12.7 % (ref 10–15)
ERYTHROCYTE [SEDIMENTATION RATE] IN BLOOD BY WESTERGREN METHOD: 20 MM/HR (ref 0–30)
FOLATE SERPL-MCNC: 5.1 NG/ML (ref 4.6–34.8)
GLUCOSE SERPL-MCNC: 110 MG/DL (ref 70–99)
HCO3 SERPL-SCNC: 28 MMOL/L (ref 22–29)
HCT VFR BLD AUTO: 39.6 % (ref 35–47)
HGB BLD-MCNC: 13.1 G/DL (ref 11.7–15.7)
IMM GRANULOCYTES # BLD: 0 10E3/UL
IMM GRANULOCYTES NFR BLD: 0 %
LYMPHOCYTES # BLD AUTO: 1.1 10E3/UL (ref 0.8–5.3)
LYMPHOCYTES NFR BLD AUTO: 12 %
MCH RBC QN AUTO: 29.9 PG (ref 26.5–33)
MCHC RBC AUTO-ENTMCNC: 33.1 G/DL (ref 31.5–36.5)
MCV RBC AUTO: 90 FL (ref 78–100)
MONOCYTES # BLD AUTO: 0.6 10E3/UL (ref 0–1.3)
MONOCYTES NFR BLD AUTO: 7 %
NEUTROPHILS # BLD AUTO: 7.8 10E3/UL (ref 1.6–8.3)
NEUTROPHILS NFR BLD AUTO: 80 %
PLATELET # BLD AUTO: 205 10E3/UL (ref 150–450)
POTASSIUM SERPL-SCNC: 3.7 MMOL/L (ref 3.4–5.3)
PROT SERPL-MCNC: 7.2 G/DL (ref 6.4–8.3)
RBC # BLD AUTO: 4.38 10E6/UL (ref 3.8–5.2)
RHEUMATOID FACT SERPL-ACNC: <10 IU/ML
SODIUM SERPL-SCNC: 140 MMOL/L (ref 135–145)
TSH SERPL DL<=0.005 MIU/L-ACNC: 1.26 UIU/ML (ref 0.3–4.2)
VIT B12 SERPL-MCNC: 443 PG/ML (ref 232–1245)
WBC # BLD AUTO: 9.7 10E3/UL (ref 4–11)

## 2025-01-02 ENCOUNTER — MYC MEDICAL ADVICE (OUTPATIENT)
Dept: FAMILY MEDICINE | Facility: CLINIC | Age: 70
End: 2025-01-02

## 2025-01-17 ENCOUNTER — ANCILLARY PROCEDURE (OUTPATIENT)
Dept: MRI IMAGING | Facility: CLINIC | Age: 70
End: 2025-01-17
Attending: OPHTHALMOLOGY
Payer: COMMERCIAL

## 2025-01-17 DIAGNOSIS — H35.013 RETINAL VASCULAR CHANGES, BILATERAL: ICD-10-CM

## 2025-01-17 PROCEDURE — 70553 MRI BRAIN STEM W/O & W/DYE: CPT | Performed by: STUDENT IN AN ORGANIZED HEALTH CARE EDUCATION/TRAINING PROGRAM

## 2025-01-17 PROCEDURE — A9585 GADOBUTROL INJECTION: HCPCS | Performed by: STUDENT IN AN ORGANIZED HEALTH CARE EDUCATION/TRAINING PROGRAM

## 2025-01-17 RX ORDER — GADOBUTROL 604.72 MG/ML
10 INJECTION INTRAVENOUS ONCE
Status: COMPLETED | OUTPATIENT
Start: 2025-01-17 | End: 2025-01-17

## 2025-01-17 RX ADMIN — GADOBUTROL 8 ML: 604.72 INJECTION INTRAVENOUS at 16:15

## 2025-01-17 NOTE — DISCHARGE INSTRUCTIONS
MRI Contrast Discharge Instructions    The IV contrast you received today will pass out of your body in your  urine. This will happen in the next 24 hours. You will not feel this process.  Your urine will not change color.    Drink at least 4 extra glasses of water or juice today (unless your doctor  has restricted your fluids). This reduces the stress on your kidneys.  You may take your regular medicines.    If you are on dialysis: It is best to have dialysis today.    If you have a reaction: Most reactions happen right away. If you have  any new symptoms after leaving the hospital (such as hives or swelling),  call your hospital at the correct number below. Or call your family doctor.  If you have breathing distress or wheezing, call 911.    Special instructions: ***    I have read and understand the above information.    Signature:______________________________________ Date:___________    Staff:__________________________________________ Date:___________     Time:__________    [facility] Departments:    ___[place]: [phone]  ___[facility]: [phone]  ___[place]: [phone] ___[facility]: [phone]  ___[facility]: [phone]  ___[facility]: [phone]  ___[phone]  ___[facility]: [phone]  ___[phone]

## 2025-01-20 ENCOUNTER — VIRTUAL VISIT (OUTPATIENT)
Dept: FAMILY MEDICINE | Facility: CLINIC | Age: 70
End: 2025-01-20
Payer: COMMERCIAL

## 2025-01-20 DIAGNOSIS — R79.82 CRP ELEVATED: ICD-10-CM

## 2025-01-20 DIAGNOSIS — M25.50 MULTIPLE JOINT PAIN: ICD-10-CM

## 2025-01-20 DIAGNOSIS — H35.019 CHANGES IN VASCULAR APPEARANCE OF RETINA, UNSPECIFIED LATERALITY: ICD-10-CM

## 2025-01-20 DIAGNOSIS — R79.89 ABNORMAL CBC: ICD-10-CM

## 2025-01-20 DIAGNOSIS — M85.80 OSTEOPENIA WITH HIGH RISK OF FRACTURE: Primary | ICD-10-CM

## 2025-01-20 PROCEDURE — 98007 SYNCH AUDIO-VIDEO EST HI 40: CPT | Performed by: NURSE PRACTITIONER

## 2025-01-20 RX ORDER — DIPHENHYDRAMINE HYDROCHLORIDE 50 MG/ML
25 INJECTION INTRAMUSCULAR; INTRAVENOUS
Start: 2025-01-27

## 2025-01-20 RX ORDER — EPINEPHRINE 1 MG/ML
0.3 INJECTION, SOLUTION, CONCENTRATE INTRAVENOUS EVERY 5 MIN PRN
OUTPATIENT
Start: 2025-01-27

## 2025-01-20 RX ORDER — ALBUTEROL SULFATE 0.83 MG/ML
2.5 SOLUTION RESPIRATORY (INHALATION)
OUTPATIENT
Start: 2025-01-27

## 2025-01-20 RX ORDER — ZOLEDRONIC ACID 0.05 MG/ML
5 INJECTION, SOLUTION INTRAVENOUS ONCE
Start: 2025-01-27

## 2025-01-20 RX ORDER — DIPHENHYDRAMINE HYDROCHLORIDE 50 MG/ML
50 INJECTION INTRAMUSCULAR; INTRAVENOUS
Start: 2025-01-27

## 2025-01-20 RX ORDER — HEPARIN SODIUM,PORCINE 10 UNIT/ML
5-20 VIAL (ML) INTRAVENOUS DAILY PRN
OUTPATIENT
Start: 2025-01-27

## 2025-01-20 RX ORDER — ALBUTEROL SULFATE 90 UG/1
1-2 INHALANT RESPIRATORY (INHALATION)
Start: 2025-01-27

## 2025-01-20 RX ORDER — METHYLPREDNISOLONE SODIUM SUCCINATE 40 MG/ML
40 INJECTION INTRAMUSCULAR; INTRAVENOUS
Start: 2025-01-27

## 2025-01-20 RX ORDER — HEPARIN SODIUM (PORCINE) LOCK FLUSH IV SOLN 100 UNIT/ML 100 UNIT/ML
5 SOLUTION INTRAVENOUS
OUTPATIENT
Start: 2025-01-27

## 2025-01-20 RX ORDER — MEPERIDINE HYDROCHLORIDE 25 MG/ML
25 INJECTION INTRAMUSCULAR; INTRAVENOUS; SUBCUTANEOUS
OUTPATIENT
Start: 2025-01-27

## 2025-01-20 NOTE — PROGRESS NOTES
"Debbie is a 69 year old who is being evaluated via a billable video visit.    How would you like to obtain your AVS? MyChart  If the video visit is dropped, the invitation should be resent by: Send to e-mail at: [email or web address]  Will anyone else be joining your video visit? No      Assessment & Plan     (M85.80) Osteopenia with high risk of fracture  (primary encounter diagnosis)  Comment:   Plan: Order placed for Reclast infusions.  Discussed the use and indication of this medication as well as potential side effects.     (M25.50) Multiple joint pain  Comment:   Plan: Protein electrophoresis, Protein         electrophoresis random urine, Adult         Rheumatology  Referral        Will have her schedule with rheumatology at [facility] and will check above labs.    (R79.89) Abnormal CBC  Comment:   Plan: CBC with platelets and differential        Likely mildly abnormal due to URI at the time.  Will recheck.     (R79.82) CRP elevated  Comment:   Plan: CRP, inflammation        Likely elevated due to URI at time.  Will recheck.     (H35.019) Changes in vascular appearance of retina, unspecified laterality  Comment:   Plan: Adult Rheumatology  Referral        See above.     The longitudinal plan of care for the diagnosis(es)/condition(s) as documented were addressed during this visit. Due to the added complexity in care, I will continue to support Debbie in the subsequent management and with ongoing continuity of care.      I spent a total of 44 minutes on the day of the visit.   Time spent by me today doing chart review, history and exam, documentation and further activities per the note      BMI  Estimated body mass index is 26.6 kg/m  as calculated from the following:    Height as of 8/14/24: 1.73 m (5' 8.11\").    Weight as of 8/14/24: 79.6 kg (175 lb 8 oz).             Subjective   Debbie is a 69 year old, presenting for the following health issues:  No chief complaint on file.        " 1/20/2025     4:11 PM   Additional Questions   Roomed by Alexandre MCGREGOR MA   Accompanied by Self         1/20/2025     4:11 PM   Patient Reported Additional Medications   Patient reports taking the following new medications None       Video Start Time:  5:15    History of Present Illness       Reason for visit:  Discuss Dexa scan results- possibly start meds for low bone density; to discuss results of recent eye exam and possible underlying issues;  results of upcoming MRI.  Symptom onset:  More than a month  Symptoms include:  Intermittent nose bleeds (right nostril), tiredness, intermittent achiness and dizziness.  Symptom intensity:  Moderate  Symptom progression:  Staying the same  Had these symptoms before:  Yes  Has tried/received treatment for these symptoms:  No  What makes it worse:  No  What makes it better:  Resting   She is taking medications regularly.     She was recently evaluated for uveitis by ophthalmology and was found to have peripheral retinal schisis and retinal vascular leakage.  She had several labs and an MRI done, which were all normal.      She does have fatigue, intermittent joint pain, intermittent dizziness. Chronic epistaxis, hair loss, night sweats, skin changes, cough (since December).  Her sister has RA, dad had PMR and MM.                   Objective           Vitals:  No vitals were obtained today due to virtual visit.    Physical Exam   GENERAL: alert and no distress  EYES: Eyes grossly normal to inspection.  No discharge or erythema, or obvious scleral/conjunctival abnormalities.  RESP: No audible wheeze, cough, or visible cyanosis.    SKIN: Visible skin clear. No significant rash, abnormal pigmentation or lesions.  NEURO: Cranial nerves grossly intact.  Mentation and speech appropriate for age.  PSYCH: Appropriate affect, tone, and pace of words          Video-Visit Details    Type of service:  Video Visit   Video End Time:5:52 PM  Originating Location (pt. Location): Home    Distant  Location (provider location):  On-site  Platform used for Video Visit: Dmitri  Signed Electronically by: Ai De La Paz NP

## 2025-01-25 ENCOUNTER — LAB (OUTPATIENT)
Dept: LAB | Facility: CLINIC | Age: 70
End: 2025-01-25
Payer: COMMERCIAL

## 2025-01-25 DIAGNOSIS — R79.82 CRP ELEVATED: ICD-10-CM

## 2025-01-25 DIAGNOSIS — R79.89 ABNORMAL CBC: ICD-10-CM

## 2025-01-25 DIAGNOSIS — M25.50 MULTIPLE JOINT PAIN: ICD-10-CM

## 2025-01-25 LAB
BASOPHILS # BLD AUTO: 0 10E3/UL (ref 0–0.2)
BASOPHILS NFR BLD AUTO: 0 %
EOSINOPHIL # BLD AUTO: 0.2 10E3/UL (ref 0–0.7)
EOSINOPHIL NFR BLD AUTO: 3 %
ERYTHROCYTE [DISTWIDTH] IN BLOOD BY AUTOMATED COUNT: 12.6 % (ref 10–15)
HCT VFR BLD AUTO: 38.1 % (ref 35–47)
HGB BLD-MCNC: 12.7 G/DL (ref 11.7–15.7)
IMM GRANULOCYTES # BLD: 0 10E3/UL
IMM GRANULOCYTES NFR BLD: 0 %
LYMPHOCYTES # BLD AUTO: 1.3 10E3/UL (ref 0.8–5.3)
LYMPHOCYTES NFR BLD AUTO: 23 %
MCH RBC QN AUTO: 30 PG (ref 26.5–33)
MCHC RBC AUTO-ENTMCNC: 33.3 G/DL (ref 31.5–36.5)
MCV RBC AUTO: 90 FL (ref 78–100)
MONOCYTES # BLD AUTO: 0.4 10E3/UL (ref 0–1.3)
MONOCYTES NFR BLD AUTO: 7 %
NEUTROPHILS # BLD AUTO: 3.8 10E3/UL (ref 1.6–8.3)
NEUTROPHILS NFR BLD AUTO: 67 %
PLATELET # BLD AUTO: 180 10E3/UL (ref 150–450)
RBC # BLD AUTO: 4.23 10E6/UL (ref 3.8–5.2)
WBC # BLD AUTO: 5.6 10E3/UL (ref 4–11)

## 2025-01-25 PROCEDURE — 84165 PROTEIN E-PHORESIS SERUM: CPT | Performed by: PATHOLOGY

## 2025-01-25 PROCEDURE — 84155 ASSAY OF PROTEIN SERUM: CPT

## 2025-01-25 PROCEDURE — 86140 C-REACTIVE PROTEIN: CPT

## 2025-01-25 PROCEDURE — 36415 COLL VENOUS BLD VENIPUNCTURE: CPT

## 2025-01-25 PROCEDURE — 84166 PROTEIN E-PHORESIS/URINE/CSF: CPT | Performed by: PATHOLOGY

## 2025-01-25 PROCEDURE — 85025 COMPLETE CBC W/AUTO DIFF WBC: CPT

## 2025-01-26 LAB
CRP SERPL-MCNC: <3 MG/L
TOTAL PROTEIN SERUM FOR ELP: 6.8 G/DL (ref 6.4–8.3)

## 2025-01-27 LAB
ALBUMIN SERPL ELPH-MCNC: 4.3 G/DL (ref 3.7–5.1)
ALPHA1 GLOB SERPL ELPH-MCNC: 0.3 G/DL (ref 0.2–0.4)
ALPHA2 GLOB SERPL ELPH-MCNC: 0.6 G/DL (ref 0.5–0.9)
B-GLOBULIN SERPL ELPH-MCNC: 0.7 G/DL (ref 0.6–1)
GAMMA GLOB SERPL ELPH-MCNC: 1 G/DL (ref 0.7–1.6)
LOCATION OF TASK: NORMAL
LOCATION OF TASK: NORMAL
M PROTEIN SERPL ELPH-MCNC: 0 G/DL
PROT PATTERN SERPL ELPH-IMP: NORMAL
PROT PATTERN UR ELPH-IMP: NORMAL

## 2025-02-18 ENCOUNTER — OFFICE VISIT (OUTPATIENT)
Dept: OPHTHALMOLOGY | Facility: CLINIC | Age: 70
End: 2025-02-18
Attending: OPHTHALMOLOGY
Payer: COMMERCIAL

## 2025-02-18 DIAGNOSIS — H35.013 RETINAL VASCULAR CHANGES, BILATERAL: Primary | ICD-10-CM

## 2025-02-18 DIAGNOSIS — H33.103 BILATERAL RETINOSCHISIS: ICD-10-CM

## 2025-02-18 DIAGNOSIS — H43.811 POSTERIOR VITREOUS DETACHMENT OF RIGHT EYE: ICD-10-CM

## 2025-02-18 PROCEDURE — G0463 HOSPITAL OUTPT CLINIC VISIT: HCPCS | Performed by: OPHTHALMOLOGY

## 2025-02-18 ASSESSMENT — EXTERNAL EXAM - LEFT EYE: OS_EXAM: NORMAL

## 2025-02-18 ASSESSMENT — REFRACTION_WEARINGRX
OD_SPHERE: -2.25
OS_SPHERE: -2.50
SPECS_TYPE: PAL
OS_ADD: +2.50
OS_CYLINDER: +2.00
OD_CYLINDER: +1.75
OS_AXIS: 165
OD_AXIS: 003
OD_ADD: +2.50

## 2025-02-18 ASSESSMENT — VISUAL ACUITY
OS_CC: 20/20
CORRECTION_TYPE: GLASSES
OD_CC: 20/20
METHOD: SNELLEN - LINEAR
OS_CC+: -1

## 2025-02-18 ASSESSMENT — CUP TO DISC RATIO
OD_RATIO: 0.3
OS_RATIO: 0.3

## 2025-02-18 ASSESSMENT — SLIT LAMP EXAM - LIDS
COMMENTS: NORMAL
COMMENTS: NORMAL

## 2025-02-18 ASSESSMENT — TONOMETRY
OS_IOP_MMHG: 19
IOP_METHOD: TONOPEN
OD_IOP_MMHG: 23

## 2025-02-18 ASSESSMENT — EXTERNAL EXAM - RIGHT EYE: OD_EXAM: NORMAL

## 2025-02-18 NOTE — LETTER
2/18/2025       RE: Debbie Richardson  [address]  [place] [address]     Dear Colleague,    Thank you for referring your patient, Debbie Richardson, to the [facility] [place] at [facility]. Please see a copy of my visit note below.    Chief Complaint/Presenting Concern:  Retina follow up    Interval History of Present Ocular Illness:  Debbie Richardson is a 69 year old patient who returns for follow up of her retinoschisis. Last visit we did not see any active retinal vascular inflammation but recommended some lab testing and MRI given other symptoms.     Debbie Richardson notes the eyes are a bit sensitive to light at times. There is some blurriness on and off at times but this is better with blinking. She notes some redness of eyelids at times mostly left eye.    Interval Updates to Medical/Family/Social History:    Got labs and imaging as below.    Relevant Review of Systems Updates:  General body aches still happen in the evening but not as often.    Labs: 12/9/24:  Normal/negative: Rheumatoid Factor,Lyme, TSH, Vitamin B12,Folate,ESR, CBC, CMP WNL other than elevated glucose 110. Elevated CRP 34.6   1/25/25: CRP normalized, CBC Normal    MRI Brain/Orbit 1/27/25:Impression--reviwed  1. Regarding the orbits and globes, there is no definite abnormal  contrast enhancement or mass. Bilateral normal optic nerves.  2. Regarding the remainder of the brain, there are few scattered  nonspecific T2 hyperintense foci in the deep white matter, not overtly  disproportionate for age.     Current eye related medications: Artificial tears    No Imaging today    Assessment:     1. Retinal vascular changes, bilateral (Primary)  The hemorrhage in the right eye has resolved and the small dot heme temporal and inferior inactive vascular changes left eye are stable     2. Bilateral retinoschisis  Stable without extension  in either eye     3. Posterior vitreous detachment of right eye  Without retinal breaks    Plan/Recommendations:    Discussed findings with patient. The retinoschisis is stable without extension in each eye and the hemorrhage right eye has resolved. There is no sign of active inflammation, labs and MRI were normal (other than CRP which normalized). As such, no treatment needed   Eye pressure is normal in each eye   Recommend additional testing: None at this time. Regarding Rheumatology, okay to consider although there may not be any urgency given labs and imaging looked well.   No treatment needed urgently blood vessels as there is no sign of active inflammation   Reasonable to consider retinal laser with Dr. Stinson if you both agree   Continue dry eye drops     RTC Dr. Stinson Mid March to consider prophylatcic barrier laser for retinoschisis, and BELTRAN RAMOS    Physician Attestation    Attending Physician Attestation:  Complete documentation of historical and exam elements from today's encounter can be found in the full encounter summary report (not reduplicated in this progress note). I personally obtained the chief complaint(s) and history of present illness. I confirmed and edited as necessary the review of systems, past medical/surgical history, family history, social history, and examination findings as documented by others; and I examined the patient myself. I personally reviewed the relevant tests, images, and reports as documented above. I formulated and edited as necessary the assessment and plan and discussed the findings and management plan with the patient and family members present at the time of this visit.  Osiel Roth M.D., Uveitis and Medical Retina, February 18, 2025     Again, thank you for allowing me to participate in the care of your patient.      Sincerely,    Osiel Roth MD  Uveitis and Medical Retina    Department of Ophthalmology & Visual  Neurosciences  [facility]

## 2025-02-18 NOTE — PROGRESS NOTES
Chief Complaint/Presenting Concern:  Retina follow up    Interval History of Present Ocular Illness:  Debbie Richardson is a 69 year old patient who returns for follow up of her retinoschisis. Last visit we did not see any active retinal vascular inflammation but recommended some lab testing and MRI given other symptoms.     Debbie Richardson notes the eyes are a bit sensitive to light at times. There is some blurriness on and off at times but this is better with blinking. She notes some redness of eyelids at times mostly left eye.    Interval Updates to Medical/Family/Social History:    Got labs and imaging as below.    Relevant Review of Systems Updates:  General body aches still happen in the evening but not as often.    Labs: 12/9/24:  Normal/negative: Rheumatoid Factor,Lyme, TSH, Vitamin B12,Folate,ESR, CBC, CMP WNL other than elevated glucose 110. Elevated CRP 34.6   1/25/25: CRP normalized, CBC Normal    MRI Brain/Orbit 1/27/25:Impression--reviwed  1. Regarding the orbits and globes, there is no definite abnormal  contrast enhancement or mass. Bilateral normal optic nerves.  2. Regarding the remainder of the brain, there are few scattered  nonspecific T2 hyperintense foci in the deep white matter, not overtly  disproportionate for age.     Current eye related medications: Artificial tears    No Imaging today    Assessment:     1. Retinal vascular changes, bilateral (Primary)  The hemorrhage in the right eye has resolved and the small dot heme temporal and inferior inactive vascular changes left eye are stable     2. Bilateral retinoschisis  Stable without extension in either eye     3. Posterior vitreous detachment of right eye  Without retinal breaks    Plan/Recommendations:    Discussed findings with patient. The retinoschisis is stable without extension in each eye and the hemorrhage right eye has resolved. There is no sign of active inflammation, labs and MRI were normal (other than CRP  which normalized). As such, no treatment needed   Eye pressure is normal in each eye   Recommend additional testing: None at this time. Regarding Rheumatology, okay to consider although there may not be any urgency given labs and imaging looked well.   No treatment needed urgently blood vessels as there is no sign of active inflammation   Reasonable to consider retinal laser with Dr. Stinson if you both agree   Continue dry eye drops     RTC Dr. Stinson Mid March to consider prophylatcic barrier laser for retinoschisis, and JCARTER PRN    Physician Attestation     Attending Physician Attestation:  Complete documentation of historical and exam elements from today's encounter can be found in the full encounter summary report (not reduplicated in this progress note). I personally obtained the chief complaint(s) and history of present illness. I confirmed and edited as necessary the review of systems, past medical/surgical history, family history, social history, and examination findings as documented by others; and I examined the patient myself. I personally reviewed the relevant tests, images, and reports as documented above. I formulated and edited as necessary the assessment and plan and discussed the findings and management plan with the patient and family members present at the time of this visit.  Osiel Roth M.D., Uveitis and Medical Retina, February 18, 2025

## 2025-02-18 NOTE — PATIENT INSTRUCTIONS
Reasonable to consider retinal laser with Dr. Stinson if you both agree   Continue over the counter drops for dryness

## 2025-02-19 RX ORDER — ALBUTEROL SULFATE 90 UG/1
1-2 INHALANT RESPIRATORY (INHALATION)
Status: CANCELLED
Start: 2026-02-19

## 2025-02-19 RX ORDER — HEPARIN SODIUM,PORCINE 10 UNIT/ML
5-20 VIAL (ML) INTRAVENOUS DAILY PRN
Status: CANCELLED | OUTPATIENT
Start: 2026-02-19

## 2025-02-19 RX ORDER — DIPHENHYDRAMINE HYDROCHLORIDE 50 MG/ML
25 INJECTION INTRAMUSCULAR; INTRAVENOUS
Status: CANCELLED
Start: 2026-02-19

## 2025-02-19 RX ORDER — ALBUTEROL SULFATE 0.83 MG/ML
2.5 SOLUTION RESPIRATORY (INHALATION)
Status: CANCELLED | OUTPATIENT
Start: 2026-02-19

## 2025-02-19 RX ORDER — EPINEPHRINE 1 MG/ML
0.3 INJECTION, SOLUTION INTRAMUSCULAR; SUBCUTANEOUS EVERY 5 MIN PRN
Status: CANCELLED | OUTPATIENT
Start: 2026-02-19

## 2025-02-19 RX ORDER — HEPARIN SODIUM (PORCINE) LOCK FLUSH IV SOLN 100 UNIT/ML 100 UNIT/ML
5 SOLUTION INTRAVENOUS
Status: CANCELLED | OUTPATIENT
Start: 2026-02-19

## 2025-02-19 RX ORDER — DIPHENHYDRAMINE HYDROCHLORIDE 50 MG/ML
50 INJECTION INTRAMUSCULAR; INTRAVENOUS
Status: CANCELLED
Start: 2026-02-19

## 2025-02-19 RX ORDER — METHYLPREDNISOLONE SODIUM SUCCINATE 40 MG/ML
40 INJECTION INTRAMUSCULAR; INTRAVENOUS
Status: CANCELLED
Start: 2026-02-19

## 2025-02-19 RX ORDER — MEPERIDINE HYDROCHLORIDE 25 MG/ML
25 INJECTION INTRAMUSCULAR; INTRAVENOUS; SUBCUTANEOUS
Status: CANCELLED | OUTPATIENT
Start: 2026-02-19

## 2025-02-19 RX ORDER — ZOLEDRONIC ACID 0.05 MG/ML
5 INJECTION, SOLUTION INTRAVENOUS ONCE
Status: CANCELLED
Start: 2026-02-19

## 2025-02-20 ENCOUNTER — THERAPY VISIT (OUTPATIENT)
Dept: PHYSICAL THERAPY | Facility: CLINIC | Age: 70
End: 2025-02-20
Attending: NURSE PRACTITIONER
Payer: COMMERCIAL

## 2025-02-20 ENCOUNTER — APPOINTMENT (OUTPATIENT)
Dept: LAB | Facility: CLINIC | Age: 70
End: 2025-02-20
Attending: NURSE PRACTITIONER
Payer: COMMERCIAL

## 2025-02-20 ENCOUNTER — INFUSION THERAPY VISIT (OUTPATIENT)
Dept: INFUSION THERAPY | Facility: CLINIC | Age: 70
End: 2025-02-20
Attending: NURSE PRACTITIONER
Payer: COMMERCIAL

## 2025-02-20 VITALS
HEART RATE: 76 BPM | SYSTOLIC BLOOD PRESSURE: 108 MMHG | TEMPERATURE: 97.4 F | HEIGHT: 69 IN | BODY MASS INDEX: 25.92 KG/M2 | OXYGEN SATURATION: 100 % | DIASTOLIC BLOOD PRESSURE: 69 MMHG | RESPIRATION RATE: 16 BRPM | WEIGHT: 175 LBS

## 2025-02-20 DIAGNOSIS — M85.80 OSTEOPENIA WITH HIGH RISK OF FRACTURE: Primary | ICD-10-CM

## 2025-02-20 DIAGNOSIS — R26.89 BALANCE PROBLEMS: ICD-10-CM

## 2025-02-20 DIAGNOSIS — M54.50 BILATERAL LOW BACK PAIN WITHOUT SCIATICA, UNSPECIFIED CHRONICITY: ICD-10-CM

## 2025-02-20 LAB
CALCIUM SERPL-MCNC: 9.4 MG/DL (ref 8.8–10.4)
CREAT SERPL-MCNC: 0.9 MG/DL (ref 0.51–0.95)
EGFRCR SERPLBLD CKD-EPI 2021: 69 ML/MIN/1.73M2

## 2025-02-20 PROCEDURE — 36415 COLL VENOUS BLD VENIPUNCTURE: CPT | Performed by: NURSE PRACTITIONER

## 2025-02-20 PROCEDURE — 82565 ASSAY OF CREATININE: CPT | Performed by: NURSE PRACTITIONER

## 2025-02-20 PROCEDURE — 82310 ASSAY OF CALCIUM: CPT | Performed by: NURSE PRACTITIONER

## 2025-02-20 PROCEDURE — 250N000011 HC RX IP 250 OP 636: Performed by: NURSE PRACTITIONER

## 2025-02-20 RX ORDER — ZOLEDRONIC ACID 0.05 MG/ML
5 INJECTION, SOLUTION INTRAVENOUS ONCE
Status: COMPLETED | OUTPATIENT
Start: 2025-02-20 | End: 2025-02-20

## 2025-02-20 RX ADMIN — ZOLEDRONIC ACID 5 MG: 0.05 INJECTION, SOLUTION INTRAVENOUS at 15:45

## 2025-02-20 ASSESSMENT — PAIN SCALES - GENERAL: PAINLEVEL_OUTOF10: NO PAIN (0)

## 2025-02-20 NOTE — PROGRESS NOTES
"Infusion Nursing Note:  Debbie Richardson presents today for Reclast (first dose).    Patient seen by provider today: No   present during visit today: Not Applicable.    Note: Reclast infused over 15 mins.    Intravenous Access:  Labs drawn without difficulty in 2nd floor lab.  Peripheral IV placed in 2nd floor lab.    Treatment Conditions:  Creatinine   Date Value Ref Range Status   02/20/2025 0.90 0.51 - 0.95 mg/dL Final     Calcium   Date Value Ref Range Status   02/20/2025 9.4 8.8 - 10.4 mg/dL Final   Creatinine clearance: 74.69    Pt states she is not currently taking any Calcium or Vit D supplement due to them upsetting her stomach in the past. The dosing information was provided to the patient, and she states she will look up some options.    /69   Pulse 76   Temp 97.4  F (36.3  C)   Resp 16   Ht 1.74 m (5' 8.5\")   Wt 79.4 kg (175 lb)   LMP 07/13/2008   SpO2 100%   BMI 26.22 kg/m      Administrations This Visit       zoledronic acid (RECLAST) infusion 5 mg       Admin Date  02/20/2025 Action  $New Bag Dose  5 mg Rate  400 mL/hr Route  Intravenous Documented By  Nely Mccarty RN                  Post Infusion Assessment:  Patient tolerated infusion without incident.  Site patent and intact, free from redness, edema or discomfort.  No evidence of extravasations.  Access discontinued per protocol.     Discharge Plan:   Copy of AVS reviewed with patient and/or family.    Patient discharged in stable condition accompanied by: self.  Departure Mode: Ambulatory.    Nely Mccarty RN  "

## 2025-02-20 NOTE — PROGRESS NOTES
PHYSICAL THERAPY EVALUATION  Type of Visit: Evaluation       Fall Risk Screen:  Fall screen completed by: PT  Have you fallen 2 or more times in the past year?: No  Have you fallen and had an injury in the past year?: No  Is patient a fall risk?: No    Subjective      Condition type:  Chronic (continuous duration <3 months)  Cause of current episode:  Repetitive     Nature of treatment:  Rehabilitative  Functional ability:  Moderate functional limitations  Documented POC (choose all that apply):  Frequency of treatment visits and treatment activities to address deficit areas.;Measurable short and long term/discharge treatment goals related to physical and functional deficits.;Patient agrees to program participation including home program  Briefly describe symptoms:  low back pain, neck pain, balance changes  How did the symptoms start:  increased activity  Average pain/intensity last 24 hours:  5/10  Average pain/intensity past week:  5/10  Frequency of Symptoms:  Frequently (51-75% of the time)  Symptom impact on ADLs:  Moderately  Condition change since eval:  N/A (first visit)  General health reported by patient:  Good      Presenting condition or subjective complaint: I care for my 1 yr old granddaughter 2days a week and with all the lifting, am feeling lower back and neck pain returning.  Date of onset: 08/14/24 (date of order)    Relevant medical history: Bladder or bowel problems; Chest pain; Depression; Dizziness; History of fractures; Implanted device; Menopause; Mental Illness; Migraines or headaches; Neck injury; Pain at night or rest; Vision problems   Dates & types of surgery: 2009 had a hysterectomy and mesh implant (failed).    Prior diagnostic imaging/testing results:       Prior therapy history for the same diagnosis, illness or injury: Yes [facility] with Chaz Webb in 2013      Living Environment  Social support: With a significant other or spouse   Type of home:  Apartment/condo   Stairs to enter the home: No       Ramp: No   Stairs inside the home: No       Help at home: None  Equipment owned:       Employment: No    Hobbies/Interests: reading, walking, travel    Patient goals for therapy: lift her from the floor without pain, hold her for longer periods while standing. Getting up from the floor with 15-20 pound granddaughter confidently     Pain assessment:      Objective   LUMBAR SPINE EVALUATION  PAIN: Pain is Exacerbated By: flexing forward, dressing, prolong standing or walking >60min, stairs (balance and strength trust), driving (neck ROM limited), sitting  INTEGUMENTARY (edema, incisions):   POSTURE: Sitting Posture: Rounded shoulders, Thoracic kyphosis decreased  ROM: flexion - reaches ankles. Extension - min limited. SG - min limited bilaterally   STRENGTH:   Hip  Flexion: L 4/5, R 5/5  Abduction: L 4-/5, R 4/5  Seated ADD screen: B 5-/5  Knee  Extension: L 5/5, R 4/5  Flexion: L 5-/5, R 5-/5  DF: B 5/5        Assessment & Plan   CLINICAL IMPRESSIONS  Medical Diagnosis: Bilateral low back pain without sciatica, unspecified chronicity.  Balance problems    Treatment Diagnosis: Back pain, lower extremity weakness   Impression/Assessment: Patient is a 69 year old female with back pain, neck pain, weakness, changes in balance complaints.  The following significant findings have been identified: Pain, Decreased joint mobility, Decreased strength, Impaired muscle performance, and Decreased activity tolerance. These impairments interfere with their ability to perform work tasks, recreational activities, household chores, and driving  as compared to previous level of function.     Clinical Decision Making (Complexity):  Clinical Presentation: Stable/Uncomplicated  Clinical Presentation Rationale: based on medical and personal factors listed in PT evaluation  Clinical Decision Making (Complexity): Low complexity    PLAN OF CARE  Treatment Interventions:  Modalities:  Cryotherapy, E-stim, Hot Pack  Interventions: Manual Therapy, Neuromuscular Re-education, Therapeutic Activity, Therapeutic Exercise, Self-Care/Home Management    Long Term Goals     PT Goal 1  Goal Description: Patient will lift at least 15 pounds from floor to hip height for at least 5 repetitions independently with appropriate mechanics and posture without back pain  Rationale: to maximize safety and independence with performance of ADLs and functional tasks;to maximize safety and independence within the home  Target Date: 05/14/25  PT Goal 2  Goal Description: Patient will navigate 2 flights of stairs without railings to show improved balance and strength  Rationale: to maximize safety and independence with performance of ADLs and functional tasks;to maximize safety and independence within the home  Target Date: 05/14/25      Frequency of Treatment: 1x per 2 weeks for 12 weeks, adjusting frequency as indicated by patient presentation  Duration of Treatment: 12 weeks    Recommended Referrals to Other Professionals:   Education Assessment:   Learner/Method: Patient    Risks and benefits of evaluation/treatment have been explained.   Patient/Family/caregiver agrees with Plan of Care.     Evaluation Time:     PT Eval, Low Complexity Minutes (54495): 19       Signing Clinician: Charmaine Saldana, PT        [facility]                                                                                   OUTPATIENT PHYSICAL THERAPY      PLAN OF TREATMENT FOR OUTPATIENT REHABILITATION   Patient's Last Name, First Name, CRISBERNIE  OsmanyDebbie Garcia YOB: 1955   Provider's Name   [facility]   Medical Record No.  [id number]     Onset Date: 08/14/24 (date of order)  Start of Care Date: 02/20/25     Medical Diagnosis:  Bilateral low back pain without sciatica, unspecified chronicity.  Balance problems      PT Treatment Diagnosis:  Back pain, lower  extremity weakness Plan of Treatment  Frequency/Duration: 1x per 2 weeks for 12 weeks, adjusting frequency as indicated by patient presentation/ 12 weeks    Certification date from 02/20/25 to 05/14/25         See note for plan of treatment details and functional goals     Charmaine Saldana, PT                         I CERTIFY THE NEED FOR THESE SERVICES FURNISHED UNDER        THIS PLAN OF TREATMENT AND WHILE UNDER MY CARE     (Physician attestation of this document indicates review and certification of the therapy plan).              Referring Provider:  Ai De La Paz    Initial Assessment  See Epic Evaluation- Start of Care Date: 02/20/25

## 2025-02-20 NOTE — NURSING NOTE
Chief Complaint   Patient presents with    Labs Only     PIV placed, vitals checked     Vivian Cano RN on 2/20/2025 at 2:54 PM

## 2025-02-20 NOTE — PATIENT INSTRUCTIONS
Dear Debbie Richardson    Thank you for choosing [facility] ([facility] for your infusion.  The following information is a summary of our appointment as well as important reminders.      Calcium/vitamin D supplements: 1 tablet by mouth twice a day. Each tablet should have 500-600mg elemental calcium and 400 units of vitamin D.    If you have any questions on your upcoming Specialty Infusion appointments, please call scheduling at [phone].  It was a pleasure taking care of you today.    Sincerely,    [facility]  [address] [place] [address]  [address]  Phone:  [phone]

## 2025-02-25 ENCOUNTER — MYC REFILL (OUTPATIENT)
Dept: FAMILY MEDICINE | Facility: CLINIC | Age: 70
End: 2025-02-25
Payer: COMMERCIAL

## 2025-02-25 DIAGNOSIS — G47.00 INSOMNIA, UNSPECIFIED TYPE: ICD-10-CM

## 2025-02-25 RX ORDER — TRAZODONE HYDROCHLORIDE 100 MG/1
TABLET ORAL
Qty: 135 TABLET | Refills: 4 | OUTPATIENT
Start: 2025-02-25

## 2025-03-11 DIAGNOSIS — H35.013 RETINAL VASCULAR CHANGES, BILATERAL: Primary | ICD-10-CM

## 2025-03-19 ENCOUNTER — OFFICE VISIT (OUTPATIENT)
Dept: OPHTHALMOLOGY | Facility: CLINIC | Age: 70
End: 2025-03-19
Attending: OPHTHALMOLOGY
Payer: COMMERCIAL

## 2025-03-19 DIAGNOSIS — H35.013 RETINAL VASCULAR CHANGES, BILATERAL: ICD-10-CM

## 2025-03-19 PROCEDURE — G0463 HOSPITAL OUTPT CLINIC VISIT: HCPCS | Performed by: OPHTHALMOLOGY

## 2025-03-19 PROCEDURE — 92134 CPTRZ OPH DX IMG PST SGM RTA: CPT | Performed by: OPHTHALMOLOGY

## 2025-03-19 ASSESSMENT — REFRACTION_WEARINGRX
OD_SPHERE: -2.25
OS_CYLINDER: +2.00
OD_CYLINDER: +1.75
OD_AXIS: 003
OD_ADD: +2.50
OS_SPHERE: -2.50
OS_AXIS: 165
SPECS_TYPE: PAL
OS_ADD: +2.50

## 2025-03-19 ASSESSMENT — TONOMETRY
OD_IOP_MMHG: 15
IOP_METHOD: ICARE
OS_IOP_MMHG: 15

## 2025-03-19 ASSESSMENT — VISUAL ACUITY
OS_CC: 20/20
OD_CC+: -1
METHOD: SNELLEN - LINEAR
OD_CC: 20/20
CORRECTION_TYPE: GLASSES

## 2025-03-19 ASSESSMENT — CUP TO DISC RATIO
OS_RATIO: 0.3
OD_RATIO: 0.3

## 2025-03-19 ASSESSMENT — EXTERNAL EXAM - RIGHT EYE: OD_EXAM: NORMAL

## 2025-03-19 ASSESSMENT — EXTERNAL EXAM - LEFT EYE: OS_EXAM: NORMAL

## 2025-03-19 ASSESSMENT — SLIT LAMP EXAM - LIDS
COMMENTS: NORMAL
COMMENTS: NORMAL

## 2025-03-19 NOTE — PROGRESS NOTES
CC: follow up retinoschisis  Interval: no new flashes and floaters; no eye pain and no changes in vision   Referred by: Dr. Gao  HPI: Debbie Richardson is a 69 year old year-old patient with history of migraine who initially presents for evaluation of a possible retinal detachment.   11/18/24 had itching eyes followed by floaters and flashes of light right eye. Seen in acute clinic earlier today with concern for retinal detachment.     No systemic symptoms other than a mild headache.    Grandchild has a dog. No history of lupus, multiple sclerosis. No problems with lungs, kidneys, changes in hearing.     PMH: migraine, IBS  No pets;   Takes care of a grandchild that has an old dog  Past ocular history: dry eye   FH:father with Age related macular degeneration and macular hole     Labs: 12/9/24: Normal/negative: Rheumatoid Factor,Lyme, TSH, Vitamin B12,Folate,ESR, CBC, CMP WNL other than elevated glucose 110. Elevated CRP 34.6   1/25/25: CRP normalized, CBC Normal    MRI Brain/Orbit 1/27/25:Impression--reviwed  1. Regarding the orbits and globes, there is no definite abnormal  contrast enhancement or mass. Bilateral normal optic nerves.  2. Regarding the remainder of the brain, there are few scattered  nonspecific T2 hyperintense foci in the deep white matter, not overtly  disproportionate for age.      Retinal Imaging:  OCT macula 03/19/25   RE: trace vitreous debris, small Retinal pigment epithelium irregularity, schisis temporal cuts   LE: PHF attached, normal macula, schisis through temporal cuts     Optos pic consistent with exam 11/21/24   Autofluorescence normal 11/21/24   Fluorescein angiography 11/21/24   Right eye normal filling, peripheral temporal vascular leakage, and intraretinal microvascular abnormalities  Left eye normal filling, peripheral temporal and inferior vascular leakage, and intraretinal microvascular abnormalities    Retinal nerve fiber layer normal both eyes      Assessment:    # Bilateral retinoschisis both eyes  retinal elevation consistent with schisis. No detachment. No tears. No schisis detachment  Recommend to Observe, if progressive can laser  - Discussed signs and symptoms of Retinal detachment  that would warrant emergent evaluation.     # prior concerns for retinal vasculitis.   She has peripheral vascular leakage on fluorescein angiography. No risk factors or systemic symptoms to narrow differential for uveitis.  - saw Dr. Roth: The retinoschisis is stable without extension in each eye and the hemorrhage right eye has resolved. There is no sign of active inflammation, labs and MRI were normal (other than CRP which normalized). As such, no treatment needed   PLAN: continue to observe    # Cataracts both eyes   Observe for now    PLAN: 4 months with fluorescein angiography (peripheral views both eyes. Transits left eye) and Optical Coherence Tomography and peripheral Optical Coherence Tomography through the schisis  Retina detachment precautions were discussed with the patient (presence or increased in flashes, floaters or a curtain in the visual field) and was asked to return if any of the those occur        ~~~~~~~~~~~~~~~~~~~~~~~~~~~~~~~~~~   Complete documentation of historical and exam elements from today's encounter can be found in the full encounter summary report (not reduplicated in this progress note).  I personally obtained the chief complaint(s) and history of present illness.  I confirmed and edited as necessary the review of systems, past medical/surgical history, family history, social history, and examination findings as documented by others; and I examined the patient myself.  I personally reviewed the relevant tests, images, and reports as documented above.  I formulated and edited as necessary the assessment and plan and discussed the findings and management plan with the patient and family    Lilli Stinson MD  Professor of  Ophthalmology.   Vitreo-retinal surgeon   Department of Ophthalmology and Visual Neurosciences   [facility]  Phone: [phone]   Fax: [phone]

## 2025-03-19 NOTE — NURSING NOTE
Chief Complaints and History of Present Illnesses   Patient presents with    Retinoschisis Follow Up     Bilateral retinoschisis     Chief Complaint(s) and History of Present Illness(es)       Retinoschisis Follow Up              Laterality: both eyes    Associated symptoms: floaters and photophobia.  Negative for shade, eye pain and blurred vision    Pain scale: 0/10    Comments: Bilateral retinoschisis              Comments    Pt states vision is the same.  No pain.  Still seeing some white lights when waking up in a dark room.  Some dryness, light sensitivity, and itching.  No change to floater RE.    MISAEL Hernandez March 19, 2025 2:01 PM

## 2025-04-15 ENCOUNTER — THERAPY VISIT (OUTPATIENT)
Dept: PHYSICAL THERAPY | Facility: CLINIC | Age: 70
End: 2025-04-15
Payer: COMMERCIAL

## 2025-04-15 DIAGNOSIS — M54.50 BILATERAL LOW BACK PAIN WITHOUT SCIATICA, UNSPECIFIED CHRONICITY: Primary | ICD-10-CM

## 2025-04-15 DIAGNOSIS — R26.89 BALANCE PROBLEMS: ICD-10-CM

## 2025-04-15 PROCEDURE — 97110 THERAPEUTIC EXERCISES: CPT | Mod: GP

## 2025-05-02 PROBLEM — M54.50 BILATERAL LOW BACK PAIN WITHOUT SCIATICA, UNSPECIFIED CHRONICITY: Status: RESOLVED | Noted: 2025-02-20 | Resolved: 2025-05-02

## 2025-05-02 PROBLEM — R26.89 BALANCE PROBLEMS: Status: RESOLVED | Noted: 2025-02-20 | Resolved: 2025-05-02

## 2025-07-10 DIAGNOSIS — H33.103 BILATERAL RETINOSCHISIS: Primary | ICD-10-CM

## 2025-07-17 ENCOUNTER — OFFICE VISIT (OUTPATIENT)
Dept: OPHTHALMOLOGY | Facility: CLINIC | Age: 70
End: 2025-07-17
Attending: OPHTHALMOLOGY
Payer: COMMERCIAL

## 2025-07-17 DIAGNOSIS — H33.103 BILATERAL RETINOSCHISIS: ICD-10-CM

## 2025-07-17 PROCEDURE — 92134 CPTRZ OPH DX IMG PST SGM RTA: CPT | Performed by: OPHTHALMOLOGY

## 2025-07-17 PROCEDURE — 92235 FLUORESCEIN ANGRPH MLTIFRAME: CPT | Performed by: OPHTHALMOLOGY

## 2025-07-17 ASSESSMENT — CUP TO DISC RATIO
OD_RATIO: 0.3
OS_RATIO: 0.3

## 2025-07-17 ASSESSMENT — EXTERNAL EXAM - LEFT EYE: OS_EXAM: NORMAL

## 2025-07-17 ASSESSMENT — REFRACTION_WEARINGRX
OD_CYLINDER: +1.75
OS_SPHERE: -2.50
OS_ADD: +2.50
OD_ADD: +2.50
OD_AXIS: 003
OS_CYLINDER: +2.00
OS_AXIS: 165
OD_SPHERE: -2.25
SPECS_TYPE: PAL

## 2025-07-17 ASSESSMENT — TONOMETRY
IOP_METHOD: TONOPEN
OS_IOP_MMHG: 12
OD_IOP_MMHG: 12

## 2025-07-17 ASSESSMENT — EXTERNAL EXAM - RIGHT EYE: OD_EXAM: NORMAL

## 2025-07-17 ASSESSMENT — VISUAL ACUITY
CORRECTION_TYPE: GLASSES
OS_CC: 20/20
OD_CC+: -1
OD_CC: 20/20
METHOD: SNELLEN - LINEAR
OS_CC+: -2

## 2025-07-17 ASSESSMENT — CONF VISUAL FIELD
OD_INFERIOR_TEMPORAL_RESTRICTION: 0
OS_SUPERIOR_TEMPORAL_RESTRICTION: 0
OD_NORMAL: 1
OS_NORMAL: 1
METHOD: COUNTING FINGERS
OS_INFERIOR_NASAL_RESTRICTION: 0
OD_INFERIOR_NASAL_RESTRICTION: 0
OS_SUPERIOR_NASAL_RESTRICTION: 0
OD_SUPERIOR_TEMPORAL_RESTRICTION: 0
OS_INFERIOR_TEMPORAL_RESTRICTION: 0
OD_SUPERIOR_NASAL_RESTRICTION: 0

## 2025-07-17 ASSESSMENT — SLIT LAMP EXAM - LIDS
COMMENTS: NORMAL
COMMENTS: NORMAL

## 2025-07-17 NOTE — PROGRESS NOTES
CC: follow up retinoschisis  Interval: right eye with flashes of light last month; none currently. No new floaters; no eye pain and no changes in vision.  Initially Referred by: Dr. Gao  HPI: Debbie Richardson is a 69 year old year-old patient with history of migraine who initially presents for evaluation of a possible retinal detachment.   11/18/24 had itching eyes followed by floaters and flashes of light right eye. Seen in acute clinic earlier today with concern for retinal detachment.     No systemic symptoms other than a mild headache.  Grandchild has a dog. No history of lupus, multiple sclerosis. No problems with lungs, kidneys, changes in hearing.     PMH: migraine, IBS  No pets;   Takes care of a grandchild that has an old dog  Past ocular history: dry eye   FH:father with Age related macular degeneration and macular hole     Labs: 12/9/24: Normal/negative: Rheumatoid Factor,Lyme, TSH, Vitamin B12,Folate,ESR, CBC, CMP WNL other than elevated glucose 110. Elevated CRP 34.6   1/25/25: CRP normalized, CBC Normal    MRI Brain/Orbit 1/27/25:Impression--reviwed  1. Regarding the orbits and globes, there is no definite abnormal; contrast enhancement or mass. Bilateral normal optic nerves.  2. Regarding the remainder of the brain, there are few scattered nonspecific T2 hyperintense foci in the deep white matter, not overtly  disproportionate for age.      Retinal Imaging:  OCT macula 07/17/25   RE: trace vitreous debris, small Retinal pigment epithelium irregularity, schisis temporal cuts - stable  LE: PHF attached, normal macula, schisis through temporal cuts - stable    Optos pic consistent with exam 07/17/25   Autofluorescence normal 07/17/25   Fluorescein angiography 07/17/25    Right eye normal filling, peripheral temporal vascular leakage, and intraretinal microvascular abnormalities- stable  Left eye normal filling, peripheral temporal and inferior vascular leakage, and intraretinal  microvascular abnormalities- stable    Retinal nerve fiber layer normal both eyes     Assessment:    # Bilateral retinoschisis both eyes  retinal elevation consistent with schisis. No detachment. No tears. No schisis detachment  Recommend to Observe, if progressive can laser  - Discussed signs and symptoms of Retinal detachment  that would warrant emergent evaluation.     # prior concerns for retinal vasculitis.   She has peripheral vascular leakage on fluorescein angiography. No risk factors or systemic symptoms to narrow differential for uveitis.  - saw Dr. Roth: The retinoschisis is stable without extension in each eye and the hemorrhage right eye has resolved. There is no sign of active inflammation, labs and MRI were normal (other than CRP which normalized). As such, no treatment needed   PLAN: continue to observe    # Cataracts both eyes   Mild   Observe for now    PLAN: 6 months with macula Optical Coherence Tomography and peripheral Optical Coherence Tomography through the schisis  Consider repeating fluorescein angiography in a yr; sooner if any concerns for vasculitis   Retina detachment precautions were discussed with the patient (presence or increased in flashes, floaters or a curtain in the visual field) and was asked to return if any of the those occur       ~~~~~~~~~~~~~~~~~~~~~~~~~~~~~~~~~~   Complete documentation of historical and exam elements from today's encounter can be found in the full encounter summary report (not reduplicated in this progress note).  I personally obtained the chief complaint(s) and history of present illness.  I confirmed and edited as necessary the review of systems, past medical/surgical history, family history, social history, and examination findings as documented by others; and I examined the patient myself.  I personally reviewed the relevant tests, images, and reports as documented above.  I formulated and edited as necessary the assessment and plan and discussed  the findings and management plan with the patient and family    Lilli Stinson MD  Professor of Ophthalmology.   Vitreo-retinal surgeon   Department of Ophthalmology and Visual Neurosciences   [facility]  Phone: [phone]   Fax: [phone]

## 2025-09-03 ENCOUNTER — OFFICE VISIT (OUTPATIENT)
Dept: FAMILY MEDICINE | Facility: CLINIC | Age: 70
End: 2025-09-03
Attending: NURSE PRACTITIONER
Payer: COMMERCIAL

## 2025-09-03 VITALS
DIASTOLIC BLOOD PRESSURE: 78 MMHG | OXYGEN SATURATION: 95 % | HEIGHT: 68 IN | RESPIRATION RATE: 15 BRPM | HEART RATE: 71 BPM | TEMPERATURE: 97.3 F | BODY MASS INDEX: 26.07 KG/M2 | SYSTOLIC BLOOD PRESSURE: 125 MMHG | WEIGHT: 172 LBS

## 2025-09-03 DIAGNOSIS — Z13.220 SCREENING CHOLESTEROL LEVEL: ICD-10-CM

## 2025-09-03 DIAGNOSIS — M25.551 BILATERAL HIP PAIN: ICD-10-CM

## 2025-09-03 DIAGNOSIS — F33.0 MILD EPISODE OF RECURRENT MAJOR DEPRESSIVE DISORDER: ICD-10-CM

## 2025-09-03 DIAGNOSIS — N95.2 VAGINAL ATROPHY: ICD-10-CM

## 2025-09-03 DIAGNOSIS — M25.552 BILATERAL HIP PAIN: ICD-10-CM

## 2025-09-03 DIAGNOSIS — Z00.00 ENCOUNTER FOR MEDICARE ANNUAL WELLNESS EXAM: Primary | ICD-10-CM

## 2025-09-03 DIAGNOSIS — G47.00 INSOMNIA, UNSPECIFIED TYPE: ICD-10-CM

## 2025-09-03 DIAGNOSIS — Z13.1 SCREENING FOR DIABETES MELLITUS: ICD-10-CM

## 2025-09-03 DIAGNOSIS — N81.6 RECTOCELE: ICD-10-CM

## 2025-09-03 LAB
ANION GAP SERPL CALCULATED.3IONS-SCNC: 8 MMOL/L (ref 7–15)
BUN SERPL-MCNC: 13.5 MG/DL (ref 8–23)
CALCIUM SERPL-MCNC: 9.8 MG/DL (ref 8.8–10.4)
CHLORIDE SERPL-SCNC: 102 MMOL/L (ref 98–107)
CHOLEST SERPL-MCNC: 201 MG/DL
CREAT SERPL-MCNC: 0.91 MG/DL (ref 0.51–0.95)
CRP SERPL-MCNC: <3 MG/L
EGFRCR SERPLBLD CKD-EPI 2021: 68 ML/MIN/1.73M2
FASTING STATUS PATIENT QL REPORTED: ABNORMAL
FASTING STATUS PATIENT QL REPORTED: NORMAL
GLUCOSE SERPL-MCNC: 80 MG/DL (ref 70–99)
HCO3 SERPL-SCNC: 29 MMOL/L (ref 22–29)
HDLC SERPL-MCNC: 60 MG/DL
LDLC SERPL CALC-MCNC: 123 MG/DL
NONHDLC SERPL-MCNC: 141 MG/DL
POTASSIUM SERPL-SCNC: 4.4 MMOL/L (ref 3.4–5.3)
SODIUM SERPL-SCNC: 139 MMOL/L (ref 135–145)
TRIGL SERPL-MCNC: 91 MG/DL

## 2025-09-03 RX ORDER — SERTRALINE HYDROCHLORIDE 100 MG/1
150 TABLET, FILM COATED ORAL DAILY
Qty: 135 TABLET | Refills: 3 | Status: SHIPPED | OUTPATIENT
Start: 2025-09-03

## 2025-09-03 RX ORDER — TRAZODONE HYDROCHLORIDE 100 MG/1
TABLET ORAL
Qty: 180 TABLET | Refills: 3 | Status: SHIPPED | OUTPATIENT
Start: 2025-09-03

## 2025-09-03 SDOH — HEALTH STABILITY: PHYSICAL HEALTH: ON AVERAGE, HOW MANY DAYS PER WEEK DO YOU ENGAGE IN MODERATE TO STRENUOUS EXERCISE (LIKE A BRISK WALK)?: 2 DAYS

## 2025-09-03 ASSESSMENT — PATIENT HEALTH QUESTIONNAIRE - PHQ9
SUM OF ALL RESPONSES TO PHQ QUESTIONS 1-9: 4
SUM OF ALL RESPONSES TO PHQ QUESTIONS 1-9: 4
10. IF YOU CHECKED OFF ANY PROBLEMS, HOW DIFFICULT HAVE THESE PROBLEMS MADE IT FOR YOU TO DO YOUR WORK, TAKE CARE OF THINGS AT HOME, OR GET ALONG WITH OTHER PEOPLE: SOMEWHAT DIFFICULT

## 2025-09-04 ENCOUNTER — PATIENT OUTREACH (OUTPATIENT)
Dept: CARE COORDINATION | Facility: CLINIC | Age: 70
End: 2025-09-04
Payer: COMMERCIAL

## (undated) RX ORDER — FENTANYL CITRATE 50 UG/ML
INJECTION, SOLUTION INTRAMUSCULAR; INTRAVENOUS
Status: DISPENSED
Start: 2023-12-13